# Patient Record
Sex: FEMALE | Race: WHITE | Employment: OTHER | URBAN - METROPOLITAN AREA
[De-identification: names, ages, dates, MRNs, and addresses within clinical notes are randomized per-mention and may not be internally consistent; named-entity substitution may affect disease eponyms.]

---

## 2017-04-13 PROBLEM — G57.90 ILIOINGUINAL NEURALGIA: Chronic | Status: ACTIVE | Noted: 2017-04-13

## 2017-04-13 PROBLEM — G89.29 CHRONIC PAIN: Chronic | Status: ACTIVE | Noted: 2017-04-13

## 2017-08-16 PROBLEM — R22.1 NECK SWELLING: Status: ACTIVE | Noted: 2017-08-16

## 2017-08-21 PROBLEM — D50.0 IRON DEFICIENCY ANEMIA DUE TO CHRONIC BLOOD LOSS: Status: ACTIVE | Noted: 2017-08-21

## 2017-08-21 PROBLEM — D50.9 IRON DEFICIENCY ANEMIA: Status: ACTIVE | Noted: 2017-08-21

## 2017-09-09 PROBLEM — G89.29 CHRONIC PAIN: Chronic | Status: RESOLVED | Noted: 2017-04-13 | Resolved: 2017-09-09

## 2017-09-09 PROBLEM — G89.4 CHRONIC PAIN SYNDROME: Chronic | Status: ACTIVE | Noted: 2017-09-09

## 2017-09-12 PROBLEM — M54.2 CERVICALGIA: Chronic | Status: ACTIVE | Noted: 2017-09-12

## 2017-09-12 PROBLEM — M79.2 NEUROPATHIC PAIN: Chronic | Status: ACTIVE | Noted: 2017-09-12

## 2018-04-18 ENCOUNTER — OFFICE VISIT (OUTPATIENT)
Dept: CARDIOLOGY CLINIC | Age: 63
End: 2018-04-18
Payer: MEDICARE

## 2018-04-18 VITALS
SYSTOLIC BLOOD PRESSURE: 134 MMHG | DIASTOLIC BLOOD PRESSURE: 74 MMHG | HEART RATE: 56 BPM | BODY MASS INDEX: 27.26 KG/M2 | HEIGHT: 66 IN | RESPIRATION RATE: 18 BRPM | WEIGHT: 169.6 LBS

## 2018-04-18 DIAGNOSIS — R00.1 SINUS BRADYCARDIA: ICD-10-CM

## 2018-04-18 DIAGNOSIS — E11.9 TYPE 2 DIABETES MELLITUS WITHOUT COMPLICATION, WITHOUT LONG-TERM CURRENT USE OF INSULIN (HCC): ICD-10-CM

## 2018-04-18 DIAGNOSIS — E78.5 HYPERLIPIDEMIA WITH TARGET LDL LESS THAN 100: ICD-10-CM

## 2018-04-18 DIAGNOSIS — R00.2 HEART PALPITATIONS: Primary | ICD-10-CM

## 2018-04-18 PROCEDURE — 99215 OFFICE O/P EST HI 40 MIN: CPT | Performed by: INTERNAL MEDICINE

## 2018-04-18 PROCEDURE — 93000 ELECTROCARDIOGRAM COMPLETE: CPT | Performed by: INTERNAL MEDICINE

## 2018-04-18 RX ORDER — TURMERIC ROOT EXTRACT 500 MG
500 TABLET ORAL DAILY
COMMUNITY
End: 2020-08-06

## 2018-04-18 RX ORDER — ONDANSETRON 4 MG/1
4 TABLET, FILM COATED ORAL EVERY 8 HOURS PRN
COMMUNITY

## 2018-04-18 RX ORDER — MAG HYDROX/ALUMINUM HYD/SIMETH 400-400-40
450 SUSPENSION, ORAL (FINAL DOSE FORM) ORAL DAILY
COMMUNITY
End: 2020-08-06

## 2018-04-18 RX ORDER — IBUPROFEN 200 MG/1
1 CAPSULE, LIQUID FILLED ORAL
COMMUNITY
End: 2019-07-10 | Stop reason: ALTCHOICE

## 2018-05-16 ENCOUNTER — OFFICE VISIT (OUTPATIENT)
Dept: ONCOLOGY | Age: 63
End: 2018-05-16
Payer: MEDICARE

## 2018-05-16 ENCOUNTER — HOSPITAL ENCOUNTER (OUTPATIENT)
Dept: INFUSION THERAPY | Age: 63
Discharge: HOME | End: 2018-05-16
Payer: MEDICARE

## 2018-05-16 VITALS
HEIGHT: 66 IN | DIASTOLIC BLOOD PRESSURE: 71 MMHG | TEMPERATURE: 98.2 F | SYSTOLIC BLOOD PRESSURE: 125 MMHG | WEIGHT: 164.5 LBS | RESPIRATION RATE: 20 BRPM | BODY MASS INDEX: 26.44 KG/M2 | HEART RATE: 82 BPM

## 2018-05-16 DIAGNOSIS — C50.919 MALIGNANT NEOPLASM OF FEMALE BREAST, UNSPECIFIED ESTROGEN RECEPTOR STATUS, UNSPECIFIED LATERALITY, UNSPECIFIED SITE OF BREAST (HCC): ICD-10-CM

## 2018-05-16 DIAGNOSIS — D50.0 IRON DEFICIENCY ANEMIA DUE TO CHRONIC BLOOD LOSS: Primary | ICD-10-CM

## 2018-05-16 DIAGNOSIS — D50.0 IRON DEFICIENCY ANEMIA DUE TO CHRONIC BLOOD LOSS: ICD-10-CM

## 2018-05-16 LAB
ALBUMIN SERPL-MCNC: 4.3 G/DL (ref 3.5–5.2)
ALP BLD-CCNC: 52 U/L (ref 35–104)
ALT SERPL-CCNC: 22 U/L (ref 0–32)
ANION GAP SERPL CALCULATED.3IONS-SCNC: 14 MMOL/L (ref 7–16)
AST SERPL-CCNC: 34 U/L (ref 0–31)
BASOPHILS ABSOLUTE: 0.04 E9/L (ref 0–0.2)
BASOPHILS RELATIVE PERCENT: 0.7 % (ref 0–2)
BILIRUB SERPL-MCNC: <0.2 MG/DL (ref 0–1.2)
BUN BLDV-MCNC: 14 MG/DL (ref 8–23)
CALCIUM SERPL-MCNC: 9.5 MG/DL (ref 8.6–10.2)
CHLORIDE BLD-SCNC: 100 MMOL/L (ref 98–107)
CO2: 26 MMOL/L (ref 22–29)
CREAT SERPL-MCNC: 0.8 MG/DL (ref 0.5–1)
EOSINOPHILS ABSOLUTE: 0.15 E9/L (ref 0.05–0.5)
EOSINOPHILS RELATIVE PERCENT: 2.8 % (ref 0–6)
FERRITIN: 23 NG/ML
GFR AFRICAN AMERICAN: >60
GFR NON-AFRICAN AMERICAN: >60 ML/MIN/1.73
GLUCOSE BLD-MCNC: 172 MG/DL (ref 74–109)
HCT VFR BLD CALC: 37.3 % (ref 34–48)
HEMOGLOBIN: 12.1 G/DL (ref 11.5–15.5)
IMMATURE GRANULOCYTES #: 0.02 E9/L
IMMATURE GRANULOCYTES %: 0.4 % (ref 0–5)
IRON SATURATION: 12 % (ref 15–50)
IRON: 41 MCG/DL (ref 37–145)
LYMPHOCYTES ABSOLUTE: 1.97 E9/L (ref 1.5–4)
LYMPHOCYTES RELATIVE PERCENT: 36.8 % (ref 20–42)
MCH RBC QN AUTO: 30.5 PG (ref 26–35)
MCHC RBC AUTO-ENTMCNC: 32.4 % (ref 32–34.5)
MCV RBC AUTO: 94 FL (ref 80–99.9)
MONOCYTES ABSOLUTE: 0.45 E9/L (ref 0.1–0.95)
MONOCYTES RELATIVE PERCENT: 8.4 % (ref 2–12)
NEUTROPHILS ABSOLUTE: 2.73 E9/L (ref 1.8–7.3)
NEUTROPHILS RELATIVE PERCENT: 50.9 % (ref 43–80)
PDW BLD-RTO: 14 FL (ref 11.5–15)
PLATELET # BLD: 263 E9/L (ref 130–450)
PMV BLD AUTO: 9.2 FL (ref 7–12)
POTASSIUM SERPL-SCNC: 4.1 MMOL/L (ref 3.5–5)
RBC # BLD: 3.97 E12/L (ref 3.5–5.5)
SODIUM BLD-SCNC: 140 MMOL/L (ref 132–146)
TOTAL IRON BINDING CAPACITY: 353 MCG/DL (ref 250–450)
TOTAL PROTEIN: 7.5 G/DL (ref 6.4–8.3)
WBC # BLD: 5.4 E9/L (ref 4.5–11.5)

## 2018-05-16 PROCEDURE — 36415 COLL VENOUS BLD VENIPUNCTURE: CPT | Performed by: INTERNAL MEDICINE

## 2018-05-16 PROCEDURE — 36415 COLL VENOUS BLD VENIPUNCTURE: CPT

## 2018-05-16 PROCEDURE — 99213 OFFICE O/P EST LOW 20 MIN: CPT | Performed by: INTERNAL MEDICINE

## 2018-05-16 PROCEDURE — 85025 COMPLETE CBC W/AUTO DIFF WBC: CPT

## 2018-05-16 PROCEDURE — 80053 COMPREHEN METABOLIC PANEL: CPT

## 2018-05-16 PROCEDURE — 83550 IRON BINDING TEST: CPT

## 2018-05-16 PROCEDURE — 83540 ASSAY OF IRON: CPT

## 2018-05-16 PROCEDURE — 82728 ASSAY OF FERRITIN: CPT

## 2018-05-16 RX ORDER — LOXAPINE SUCCINATE 10 MG/1
10 TABLET ORAL NIGHTLY
COMMUNITY

## 2018-07-25 ENCOUNTER — HOSPITAL ENCOUNTER (OUTPATIENT)
Age: 63
Discharge: HOME | End: 2018-07-25
Payer: MEDICARE

## 2018-07-25 LAB
ALBUMIN SERPL-MCNC: 4.4 G/DL (ref 3.5–5.2)
ALP BLD-CCNC: 48 U/L (ref 35–104)
ALT SERPL-CCNC: 23 U/L (ref 0–32)
AMYLASE: 62 U/L (ref 20–100)
ANION GAP SERPL CALCULATED.3IONS-SCNC: 12 MMOL/L (ref 7–16)
AST SERPL-CCNC: 38 U/L (ref 0–31)
BASOPHILS ABSOLUTE: 0.03 E9/L (ref 0–0.2)
BASOPHILS RELATIVE PERCENT: 0.5 % (ref 0–2)
BILIRUB SERPL-MCNC: <0.2 MG/DL (ref 0–1.2)
BUN BLDV-MCNC: 12 MG/DL (ref 8–23)
CALCIUM SERPL-MCNC: 10.1 MG/DL (ref 8.6–10.2)
CHLORIDE BLD-SCNC: 104 MMOL/L (ref 98–107)
CO2: 25 MMOL/L (ref 22–29)
CREAT SERPL-MCNC: 0.8 MG/DL (ref 0.5–1)
EOSINOPHILS ABSOLUTE: 0.14 E9/L (ref 0.05–0.5)
EOSINOPHILS RELATIVE PERCENT: 2.4 % (ref 0–6)
GFR AFRICAN AMERICAN: >60
GFR NON-AFRICAN AMERICAN: >60 ML/MIN/1.73
GLUCOSE BLD-MCNC: 164 MG/DL (ref 74–109)
HCT VFR BLD CALC: 38.3 % (ref 34–48)
HEMOGLOBIN: 12.2 G/DL (ref 11.5–15.5)
IMMATURE GRANULOCYTES #: 0.02 E9/L
IMMATURE GRANULOCYTES %: 0.3 % (ref 0–5)
LIPASE: 84 U/L (ref 13–60)
LYMPHOCYTES ABSOLUTE: 2.16 E9/L (ref 1.5–4)
LYMPHOCYTES RELATIVE PERCENT: 37.1 % (ref 20–42)
MCH RBC QN AUTO: 29.6 PG (ref 26–35)
MCHC RBC AUTO-ENTMCNC: 31.9 % (ref 32–34.5)
MCV RBC AUTO: 93 FL (ref 80–99.9)
MONOCYTES ABSOLUTE: 0.56 E9/L (ref 0.1–0.95)
MONOCYTES RELATIVE PERCENT: 9.6 % (ref 2–12)
NEUTROPHILS ABSOLUTE: 2.91 E9/L (ref 1.8–7.3)
NEUTROPHILS RELATIVE PERCENT: 50.1 % (ref 43–80)
PDW BLD-RTO: 13.9 FL (ref 11.5–15)
PLATELET # BLD: 281 E9/L (ref 130–450)
PMV BLD AUTO: 9.2 FL (ref 7–12)
POTASSIUM SERPL-SCNC: 5.3 MMOL/L (ref 3.5–5)
RBC # BLD: 4.12 E12/L (ref 3.5–5.5)
SODIUM BLD-SCNC: 141 MMOL/L (ref 132–146)
TOTAL PROTEIN: 7.7 G/DL (ref 6.4–8.3)
WBC # BLD: 5.8 E9/L (ref 4.5–11.5)

## 2018-07-25 PROCEDURE — 87522 HEPATITIS C REVRS TRNSCRPJ: CPT

## 2018-07-25 PROCEDURE — 82150 ASSAY OF AMYLASE: CPT

## 2018-07-25 PROCEDURE — 83690 ASSAY OF LIPASE: CPT

## 2018-07-25 PROCEDURE — 36415 COLL VENOUS BLD VENIPUNCTURE: CPT

## 2018-07-25 PROCEDURE — 80053 COMPREHEN METABOLIC PANEL: CPT

## 2018-07-25 PROCEDURE — 85025 COMPLETE CBC W/AUTO DIFF WBC: CPT

## 2018-07-29 LAB
EER HCV RNA QNT PCR: NORMAL
HCV RNA QNT REAL-TIME PCR INTERP: NOT DETECTED
HCV RNA, QUANTITATIVE REAL TIME PCR: <1.2 LOG IU
HEPATITIS C RNA PCR QUANT: <15 IU/ML

## 2018-10-31 ENCOUNTER — HOSPITAL ENCOUNTER (OUTPATIENT)
Dept: MAMMOGRAPHY | Age: 63
Discharge: HOME | End: 2018-11-02
Payer: MEDICARE

## 2018-10-31 DIAGNOSIS — D50.0 IRON DEFICIENCY ANEMIA DUE TO CHRONIC BLOOD LOSS: ICD-10-CM

## 2018-10-31 DIAGNOSIS — C50.919 MALIGNANT NEOPLASM OF FEMALE BREAST, UNSPECIFIED ESTROGEN RECEPTOR STATUS, UNSPECIFIED LATERALITY, UNSPECIFIED SITE OF BREAST (HCC): ICD-10-CM

## 2018-10-31 PROCEDURE — 77063 BREAST TOMOSYNTHESIS BI: CPT

## 2018-11-19 DIAGNOSIS — M25.511 ACUTE PAIN OF RIGHT SHOULDER: Primary | ICD-10-CM

## 2018-11-20 ENCOUNTER — OFFICE VISIT (OUTPATIENT)
Dept: ORTHOPEDIC SURGERY | Age: 63
End: 2018-11-20
Payer: MEDICARE

## 2018-11-20 VITALS — WEIGHT: 151 LBS | HEIGHT: 66 IN | BODY MASS INDEX: 24.27 KG/M2

## 2018-11-20 DIAGNOSIS — M75.121 COMPLETE TEAR OF RIGHT ROTATOR CUFF: ICD-10-CM

## 2018-11-20 DIAGNOSIS — M75.41 SHOULDER IMPINGEMENT, RIGHT: Primary | ICD-10-CM

## 2018-11-20 PROCEDURE — 99203 OFFICE O/P NEW LOW 30 MIN: CPT | Performed by: ORTHOPAEDIC SURGERY

## 2018-11-20 RX ORDER — BUPROPION HYDROCHLORIDE 150 MG/1
150 TABLET ORAL EVERY MORNING
COMMUNITY
End: 2020-08-06

## 2018-11-20 RX ORDER — ASPIRIN 325 MG
81 TABLET ORAL DAILY
COMMUNITY
End: 2019-04-18

## 2018-11-27 ENCOUNTER — HOSPITAL ENCOUNTER (OUTPATIENT)
Dept: MRI IMAGING | Age: 63
Discharge: HOME | End: 2018-11-29
Payer: MEDICARE

## 2018-11-27 ENCOUNTER — TELEPHONE (OUTPATIENT)
Dept: INFUSION THERAPY | Age: 63
End: 2018-11-27

## 2018-11-27 DIAGNOSIS — M75.121 COMPLETE TEAR OF RIGHT ROTATOR CUFF: ICD-10-CM

## 2018-11-27 DIAGNOSIS — M75.41 SHOULDER IMPINGEMENT, RIGHT: ICD-10-CM

## 2018-11-27 PROCEDURE — 73221 MRI JOINT UPR EXTREM W/O DYE: CPT

## 2018-11-29 ENCOUNTER — OFFICE VISIT (OUTPATIENT)
Dept: ORTHOPEDIC SURGERY | Age: 63
End: 2018-11-29
Payer: MEDICARE

## 2018-11-29 VITALS — HEIGHT: 66 IN | WEIGHT: 152 LBS | TEMPERATURE: 98 F | BODY MASS INDEX: 24.43 KG/M2

## 2018-11-29 DIAGNOSIS — M75.41 SHOULDER IMPINGEMENT, RIGHT: Primary | ICD-10-CM

## 2018-11-29 DIAGNOSIS — M75.111 INCOMPLETE TEAR OF RIGHT ROTATOR CUFF: ICD-10-CM

## 2018-11-29 PROCEDURE — 99213 OFFICE O/P EST LOW 20 MIN: CPT | Performed by: ORTHOPAEDIC SURGERY

## 2018-11-29 PROCEDURE — 20610 DRAIN/INJ JOINT/BURSA W/O US: CPT | Performed by: ORTHOPAEDIC SURGERY

## 2018-11-29 RX ORDER — TRIAMCINOLONE ACETONIDE 40 MG/ML
40 INJECTION, SUSPENSION INTRA-ARTICULAR; INTRAMUSCULAR ONCE
Status: COMPLETED | OUTPATIENT
Start: 2018-11-29 | End: 2018-11-29

## 2018-11-29 RX ADMIN — TRIAMCINOLONE ACETONIDE 40 MG: 40 INJECTION, SUSPENSION INTRA-ARTICULAR; INTRAMUSCULAR at 09:39

## 2018-11-29 NOTE — PROGRESS NOTES
Chief Complaint   Patient presents with    Shoulder Pain     Right Shoulder MRI F/U        Josué Flowers is a 61y.o. year old   female who is seen today  for evaluation of right shoulder pain. She reports the pain has been ongoing for the past 2 months. She does recall a specific injury which started the pain. She was showering and felt a pop in her shoulder raising her arm up. She reports the pain is worse with activity, better with rest.  The patient does have mechanical symptoms. Shedoes have night pain. She denies a feeling of instability. The prior treatments have been none. The patient   has not responded to the treatment. The patient is right hand dominant. The patient is not working. The patients occupation is retired.  She is here for MRI results      Chief Complaint   Patient presents with    Shoulder Pain     Right Shoulder MRI F/U     Past Medical History:   Diagnosis Date    Arthritis     spine    Asthma     Bipolar depression ([address]     Bronchitis     Cancer ([address]     breast left    Diabetes mellitus ([address]     Diverticulitis     Fibromyalgia     GERD (gastroesophageal reflux disease)     Herpes     herpes 2 genitalia    Hiatal hernia     History of blood transfusion     History of cardiovascular stress test 2/15/14    lexiscan    Hyperlipidemia     Hypertensive cardiovascular disease 2/4/2013    IBS (irritable bowel syndrome)     Liver disease     Steato Hepatitis (fatty liver)    Migraines     Neuropathy     of feet    Osteopenia     Pancreatitis     Pericarditis [id number]    Pneumonia oct 1991    PONV (postoperative nausea and vomiting)     TIA (transient ischemic attack) 2008     Past Surgical History:   Procedure Laterality Date    ABDOMEN SURGERY  march 2010    polyp removal    ABDOMEN SURGERY      exploratory for scar tissue    APPENDECTOMY      BREAST SURGERY  oct 26 2010, nov 11 2010    lumpectomy left    BREAST SURGERY Left 10/14/2015    excision Disp: , Rfl:     CALCIUM-VITAMIN D PO, Take 1 tablet by mouth daily 1200 mg/ 25mg, Disp: , Rfl:     losartan (COZAAR) 25 MG tablet, Take 25 mg by mouth daily. , Disp: , Rfl:     Multiple Vitamin (MULTIVITAMIN PO), Take 1 tablet by mouth once., Disp: , Rfl:     fish oil-omega-3 fatty acids 1000 MG capsule, Take 1 g by mouth 3 times daily , Disp: , Rfl:     glipiZIDE (GLUCOTROL) 10 MG CR tablet, Take 10 mg by mouth 2 times daily (before meals). Glipizide er, Disp: , Rfl:     Sertraline HCl (ZOLOFT PO), Take 250 mg by mouth nightly., Disp: , Rfl:     celecoxib (CELEBREX) 100 MG capsule, Take 1 capsule by mouth every 12 hours This is a 90 day supply, Disp: 180 capsule, Rfl: 3    HYDROcodone-acetaminophen (NORCO) 5-325 MG per tablet, Take 1 tablet by mouth every 8 hours as needed for Pain . Earliest Fill Date: 11/11/17, Disp: 90 tablet, Rfl: 0  Allergies   Allergen Reactions    Codeine Swelling     Pure codeine gets itching    Macrobid [Nitrofurantoin Monohydrate Macrocrystals] Rash    Neosporin [Neomycin-Polymyx-Gramicid] Dermatitis    Pcn [Penicillins] Hives     On stomach and thighs      Ultram [Tramadol Hcl] Itching     & hallucinations      Sulfa Antibiotics      Skin turns red       Social History     Social History    Marital status:      Spouse name: N/A    Number of children: N/A    Years of education: N/A     Occupational History    Not on file. Social History Main Topics    Smoking status: Never Smoker    Smokeless tobacco: Never Used    Alcohol use No      Comment: 4-6 cups of coffee daily    Drug use: No    Sexual activity: Yes     Partners: Male      Comment: not assessed     Other Topics Concern    Not on file     Social History Narrative    No narrative on file     Family History   Problem Relation Age of Onset    Kidney Disease Mother     Stroke Father        REVIEW OF SYSTEMS:     General/Constitution:  (-)weight loss, (-)fever, (-)chills, (-)weakness.    Skin: (-) insertion of the infraspinatus tendon. 2. Tendinopathy versus partial tear of the supraspinatus and to a   lesser extent infraspinatus tendons. 3. Other findings as discussed         Radiographic findings reviewed with patient    Impression:   Encounter Diagnosis   Name Primary?  Shoulder impingement, right Yes       Plan:   I had a lengthy discussion with the patient regarding their diagnosis. I explained treatment options including surgical vs non surgical treatment. I reviewed in detail the risks and benefits and outlined the procedure in detail with expected outcomes and possible complications. I also discussed non surgical treatment such as injections (CSI), physical therapy, topical creams and NSAID's. They have elected for conservative management at this time. I will proceed with a cortisone injection in the Right shoulder. Verbal and written consent was obtained for the injection. Skin was prepped with alcohol, 1 ml of Kenalog 40 mg and 9 ml of 0.25% Marcaine was injected to the posterior shoulder through the subacromial space of the Right Shoulder. The patient tolerated the injections well. I will see the patient back prn.   HEP

## 2018-12-17 ENCOUNTER — OFFICE VISIT (OUTPATIENT)
Dept: ONCOLOGY | Age: 63
End: 2018-12-17
Payer: MEDICARE

## 2018-12-17 ENCOUNTER — HOSPITAL ENCOUNTER (OUTPATIENT)
Dept: INFUSION THERAPY | Age: 63
Discharge: HOME | End: 2018-12-17
Payer: MEDICARE

## 2018-12-17 VITALS
RESPIRATION RATE: 20 BRPM | BODY MASS INDEX: 25.26 KG/M2 | HEART RATE: 70 BPM | DIASTOLIC BLOOD PRESSURE: 74 MMHG | WEIGHT: 157.2 LBS | SYSTOLIC BLOOD PRESSURE: 130 MMHG | HEIGHT: 66 IN | TEMPERATURE: 98.5 F

## 2018-12-17 DIAGNOSIS — D50.0 IRON DEFICIENCY ANEMIA DUE TO CHRONIC BLOOD LOSS: ICD-10-CM

## 2018-12-17 DIAGNOSIS — C50.919 MALIGNANT NEOPLASM OF FEMALE BREAST, UNSPECIFIED ESTROGEN RECEPTOR STATUS, UNSPECIFIED LATERALITY, UNSPECIFIED SITE OF BREAST (HCC): Primary | ICD-10-CM

## 2018-12-17 DIAGNOSIS — C50.919 MALIGNANT NEOPLASM OF FEMALE BREAST, UNSPECIFIED ESTROGEN RECEPTOR STATUS, UNSPECIFIED LATERALITY, UNSPECIFIED SITE OF BREAST (HCC): ICD-10-CM

## 2018-12-17 LAB
ALBUMIN SERPL-MCNC: 4.4 G/DL (ref 3.5–5.2)
ALP BLD-CCNC: 47 U/L (ref 35–104)
ALT SERPL-CCNC: 21 U/L (ref 0–32)
ANION GAP SERPL CALCULATED.3IONS-SCNC: 14 MMOL/L (ref 7–16)
AST SERPL-CCNC: 26 U/L (ref 0–31)
BASOPHILS ABSOLUTE: 0.04 E9/L (ref 0–0.2)
BASOPHILS RELATIVE PERCENT: 0.6 % (ref 0–2)
BILIRUB SERPL-MCNC: 0.2 MG/DL (ref 0–1.2)
BUN BLDV-MCNC: 11 MG/DL (ref 8–23)
CALCIUM SERPL-MCNC: 9.1 MG/DL (ref 8.6–10.2)
CHLORIDE BLD-SCNC: 102 MMOL/L (ref 98–107)
CO2: 25 MMOL/L (ref 22–29)
CREAT SERPL-MCNC: 0.7 MG/DL (ref 0.5–1)
EOSINOPHILS ABSOLUTE: 0.16 E9/L (ref 0.05–0.5)
EOSINOPHILS RELATIVE PERCENT: 2.5 % (ref 0–6)
FERRITIN: 9 NG/ML
GFR AFRICAN AMERICAN: >60
GFR NON-AFRICAN AMERICAN: >60 ML/MIN/1.73
GLUCOSE BLD-MCNC: 117 MG/DL (ref 74–99)
HCT VFR BLD CALC: 35.9 % (ref 34–48)
HEMOGLOBIN: 11.1 G/DL (ref 11.5–15.5)
IMMATURE GRANULOCYTES #: 0.03 E9/L
IMMATURE GRANULOCYTES %: 0.5 % (ref 0–5)
IRON SATURATION: 11 % (ref 15–50)
IRON: 42 MCG/DL (ref 37–145)
LYMPHOCYTES ABSOLUTE: 2.24 E9/L (ref 1.5–4)
LYMPHOCYTES RELATIVE PERCENT: 34.6 % (ref 20–42)
MCH RBC QN AUTO: 27 PG (ref 26–35)
MCHC RBC AUTO-ENTMCNC: 30.9 % (ref 32–34.5)
MCV RBC AUTO: 87.3 FL (ref 80–99.9)
MONOCYTES ABSOLUTE: 0.64 E9/L (ref 0.1–0.95)
MONOCYTES RELATIVE PERCENT: 9.9 % (ref 2–12)
NEUTROPHILS ABSOLUTE: 3.37 E9/L (ref 1.8–7.3)
NEUTROPHILS RELATIVE PERCENT: 51.9 % (ref 43–80)
PDW BLD-RTO: 14 FL (ref 11.5–15)
PLATELET # BLD: 321 E9/L (ref 130–450)
PMV BLD AUTO: 8.5 FL (ref 7–12)
POTASSIUM SERPL-SCNC: 4.7 MMOL/L (ref 3.5–5)
RBC # BLD: 4.11 E12/L (ref 3.5–5.5)
SODIUM BLD-SCNC: 141 MMOL/L (ref 132–146)
TOTAL IRON BINDING CAPACITY: 383 MCG/DL (ref 250–450)
TOTAL PROTEIN: 7.6 G/DL (ref 6.4–8.3)
WBC # BLD: 6.5 E9/L (ref 4.5–11.5)

## 2018-12-17 PROCEDURE — 83540 ASSAY OF IRON: CPT

## 2018-12-17 PROCEDURE — 99212 OFFICE O/P EST SF 10 MIN: CPT | Performed by: INTERNAL MEDICINE

## 2018-12-17 PROCEDURE — 36415 COLL VENOUS BLD VENIPUNCTURE: CPT

## 2018-12-17 PROCEDURE — 83550 IRON BINDING TEST: CPT

## 2018-12-17 PROCEDURE — 85025 COMPLETE CBC W/AUTO DIFF WBC: CPT

## 2018-12-17 PROCEDURE — 80053 COMPREHEN METABOLIC PANEL: CPT

## 2018-12-17 PROCEDURE — 82728 ASSAY OF FERRITIN: CPT

## 2018-12-17 RX ORDER — CIPROFLOXACIN 500 MG/1
500 TABLET, FILM COATED ORAL 2 TIMES DAILY
COMMUNITY
End: 2019-02-18 | Stop reason: ALTCHOICE

## 2018-12-17 RX ORDER — SODIUM CHLORIDE 9 MG/ML
INJECTION, SOLUTION INTRAVENOUS ONCE
Status: CANCELLED | OUTPATIENT
Start: 2018-12-17 | End: 2018-12-17

## 2018-12-18 ENCOUNTER — TELEPHONE (OUTPATIENT)
Dept: ONCOLOGY | Age: 63
End: 2018-12-18

## 2018-12-27 ENCOUNTER — HOSPITAL ENCOUNTER (OUTPATIENT)
Dept: INFUSION THERAPY | Age: 63
Discharge: HOME | End: 2018-12-27
Payer: MEDICARE

## 2018-12-27 VITALS
TEMPERATURE: 98.3 F | SYSTOLIC BLOOD PRESSURE: 133 MMHG | HEART RATE: 62 BPM | RESPIRATION RATE: 20 BRPM | DIASTOLIC BLOOD PRESSURE: 73 MMHG

## 2018-12-27 DIAGNOSIS — D50.0 IRON DEFICIENCY ANEMIA DUE TO CHRONIC BLOOD LOSS: ICD-10-CM

## 2018-12-27 PROCEDURE — 6360000002 HC RX W HCPCS: Performed by: NURSE PRACTITIONER

## 2018-12-27 PROCEDURE — 96365 THER/PROPH/DIAG IV INF INIT: CPT

## 2018-12-27 PROCEDURE — 96367 TX/PROPH/DG ADDL SEQ IV INF: CPT

## 2018-12-27 PROCEDURE — 2580000003 HC RX 258: Performed by: NURSE PRACTITIONER

## 2018-12-27 RX ORDER — SODIUM CHLORIDE 9 MG/ML
INJECTION, SOLUTION INTRAVENOUS ONCE
Status: CANCELLED | OUTPATIENT
Start: 2018-12-27 | End: 2018-12-27

## 2018-12-27 RX ORDER — SODIUM CHLORIDE 9 MG/ML
INJECTION, SOLUTION INTRAVENOUS ONCE
Status: COMPLETED | OUTPATIENT
Start: 2018-12-27 | End: 2018-12-27

## 2018-12-27 RX ADMIN — SODIUM CHLORIDE: 9 INJECTION, SOLUTION INTRAVENOUS at 10:17

## 2018-12-27 RX ADMIN — FERUMOXYTOL 510 MG: 510 INJECTION INTRAVENOUS at 10:17

## 2019-01-25 ENCOUNTER — HOSPITAL ENCOUNTER (OUTPATIENT)
Age: 64
Discharge: HOME | End: 2019-01-25
Payer: MEDICARE

## 2019-01-25 LAB
ALBUMIN SERPL-MCNC: 4.8 G/DL (ref 3.5–5.2)
ALP BLD-CCNC: 47 U/L (ref 35–104)
ALT SERPL-CCNC: 21 U/L (ref 0–32)
ANION GAP SERPL CALCULATED.3IONS-SCNC: 14 MMOL/L (ref 7–16)
AST SERPL-CCNC: 26 U/L (ref 0–31)
BASOPHILS ABSOLUTE: 0.04 E9/L (ref 0–0.2)
BASOPHILS RELATIVE PERCENT: 0.7 % (ref 0–2)
BILIRUB SERPL-MCNC: 0.3 MG/DL (ref 0–1.2)
BUN BLDV-MCNC: 9 MG/DL (ref 8–23)
CALCIUM SERPL-MCNC: 10.3 MG/DL (ref 8.6–10.2)
CHLORIDE BLD-SCNC: 97 MMOL/L (ref 98–107)
CO2: 28 MMOL/L (ref 22–29)
CREAT SERPL-MCNC: 0.7 MG/DL (ref 0.5–1)
EOSINOPHILS ABSOLUTE: 0.09 E9/L (ref 0.05–0.5)
EOSINOPHILS RELATIVE PERCENT: 1.5 % (ref 0–6)
FERRITIN: 89 NG/ML
GFR AFRICAN AMERICAN: >60
GFR NON-AFRICAN AMERICAN: >60 ML/MIN/1.73
GLUCOSE BLD-MCNC: 112 MG/DL (ref 74–99)
HCT VFR BLD CALC: 43.2 % (ref 34–48)
HEMOGLOBIN: 13.7 G/DL (ref 11.5–15.5)
IMMATURE GRANULOCYTES #: 0.01 E9/L
IMMATURE GRANULOCYTES %: 0.2 % (ref 0–5)
IRON SATURATION: 26 % (ref 15–50)
IRON: 85 MCG/DL (ref 37–145)
LYMPHOCYTES ABSOLUTE: 2.26 E9/L (ref 1.5–4)
LYMPHOCYTES RELATIVE PERCENT: 37.2 % (ref 20–42)
MCH RBC QN AUTO: 28.5 PG (ref 26–35)
MCHC RBC AUTO-ENTMCNC: 31.7 % (ref 32–34.5)
MCV RBC AUTO: 89.8 FL (ref 80–99.9)
MONOCYTES ABSOLUTE: 0.59 E9/L (ref 0.1–0.95)
MONOCYTES RELATIVE PERCENT: 9.7 % (ref 2–12)
NEUTROPHILS ABSOLUTE: 3.08 E9/L (ref 1.8–7.3)
NEUTROPHILS RELATIVE PERCENT: 50.7 % (ref 43–80)
PDW BLD-RTO: 18.6 FL (ref 11.5–15)
PLATELET # BLD: 298 E9/L (ref 130–450)
PMV BLD AUTO: 8.7 FL (ref 7–12)
POTASSIUM SERPL-SCNC: 4.3 MMOL/L (ref 3.5–5)
RBC # BLD: 4.81 E12/L (ref 3.5–5.5)
SODIUM BLD-SCNC: 139 MMOL/L (ref 132–146)
TOTAL IRON BINDING CAPACITY: 325 MCG/DL (ref 250–450)
TOTAL PROTEIN: 8.1 G/DL (ref 6.4–8.3)
WBC # BLD: 6.1 E9/L (ref 4.5–11.5)

## 2019-01-25 PROCEDURE — 82728 ASSAY OF FERRITIN: CPT

## 2019-01-25 PROCEDURE — 80053 COMPREHEN METABOLIC PANEL: CPT

## 2019-01-25 PROCEDURE — 83540 ASSAY OF IRON: CPT

## 2019-01-25 PROCEDURE — 83550 IRON BINDING TEST: CPT

## 2019-01-25 PROCEDURE — 85025 COMPLETE CBC W/AUTO DIFF WBC: CPT

## 2019-01-25 PROCEDURE — 36415 COLL VENOUS BLD VENIPUNCTURE: CPT

## 2019-01-29 ENCOUNTER — HOSPITAL ENCOUNTER (OUTPATIENT)
Age: 64
Discharge: HOME | End: 2019-01-29
Payer: MEDICARE

## 2019-01-29 PROCEDURE — 87088 URINE BACTERIA CULTURE: CPT

## 2019-01-31 LAB — URINE CULTURE, ROUTINE: NORMAL

## 2019-02-15 RX ORDER — SODIUM CHLORIDE 0.9 % (FLUSH) 0.9 %
10 SYRINGE (ML) INJECTION PRN
Status: CANCELLED | OUTPATIENT
Start: 2019-02-15

## 2019-02-15 RX ORDER — SODIUM CHLORIDE 9 MG/ML
INJECTION, SOLUTION INTRAVENOUS CONTINUOUS
Status: CANCELLED | OUTPATIENT
Start: 2019-02-15

## 2019-02-18 ENCOUNTER — OFFICE VISIT (OUTPATIENT)
Dept: ONCOLOGY | Age: 64
End: 2019-02-18
Payer: MEDICARE

## 2019-02-18 ENCOUNTER — HOSPITAL ENCOUNTER (OUTPATIENT)
Dept: PREADMISSION TESTING | Age: 64
Discharge: HOME | End: 2019-02-18
Payer: MEDICARE

## 2019-02-18 ENCOUNTER — HOSPITAL ENCOUNTER (OUTPATIENT)
Dept: INFUSION THERAPY | Age: 64
Discharge: HOME | End: 2019-02-18
Payer: MEDICARE

## 2019-02-18 VITALS
HEIGHT: 66 IN | HEART RATE: 63 BPM | SYSTOLIC BLOOD PRESSURE: 101 MMHG | DIASTOLIC BLOOD PRESSURE: 66 MMHG | RESPIRATION RATE: 20 BRPM | BODY MASS INDEX: 25.07 KG/M2 | WEIGHT: 156 LBS | TEMPERATURE: 98.7 F

## 2019-02-18 VITALS
RESPIRATION RATE: 16 BRPM | DIASTOLIC BLOOD PRESSURE: 66 MMHG | SYSTOLIC BLOOD PRESSURE: 106 MMHG | WEIGHT: 155.31 LBS | HEART RATE: 66 BPM | BODY MASS INDEX: 24.96 KG/M2 | OXYGEN SATURATION: 98 % | TEMPERATURE: 98.1 F | HEIGHT: 66 IN

## 2019-02-18 DIAGNOSIS — Z01.818 PRE-OP TESTING: Primary | ICD-10-CM

## 2019-02-18 DIAGNOSIS — C50.212 MALIGNANT NEOPLASM OF UPPER-INNER QUADRANT OF LEFT FEMALE BREAST, UNSPECIFIED ESTROGEN RECEPTOR STATUS (HCC): Primary | ICD-10-CM

## 2019-02-18 DIAGNOSIS — D50.0 IRON DEFICIENCY ANEMIA DUE TO CHRONIC BLOOD LOSS: ICD-10-CM

## 2019-02-18 DIAGNOSIS — Z17.0 ER+ (ESTROGEN RECEPTOR POSITIVE STATUS): ICD-10-CM

## 2019-02-18 LAB
ANION GAP SERPL CALCULATED.3IONS-SCNC: 12 MMOL/L (ref 7–16)
BUN BLDV-MCNC: 12 MG/DL (ref 8–23)
CALCIUM SERPL-MCNC: 9.4 MG/DL (ref 8.6–10.2)
CHLORIDE BLD-SCNC: 101 MMOL/L (ref 98–107)
CO2: 27 MMOL/L (ref 22–29)
CREAT SERPL-MCNC: 0.7 MG/DL (ref 0.5–1)
EKG ATRIAL RATE: 60 BPM
EKG P AXIS: 38 DEGREES
EKG P-R INTERVAL: 166 MS
EKG Q-T INTERVAL: 414 MS
EKG QRS DURATION: 86 MS
EKG QTC CALCULATION (BAZETT): 414 MS
EKG R AXIS: -23 DEGREES
EKG T AXIS: 17 DEGREES
EKG VENTRICULAR RATE: 60 BPM
GFR AFRICAN AMERICAN: >60
GFR NON-AFRICAN AMERICAN: >60 ML/MIN/1.73
GLUCOSE BLD-MCNC: 104 MG/DL (ref 74–99)
HCT VFR BLD CALC: 41 % (ref 34–48)
HEMOGLOBIN: 13.3 G/DL (ref 11.5–15.5)
MCH RBC QN AUTO: 28.9 PG (ref 26–35)
MCHC RBC AUTO-ENTMCNC: 32.4 % (ref 32–34.5)
MCV RBC AUTO: 89.1 FL (ref 80–99.9)
PDW BLD-RTO: 18.8 FL (ref 11.5–15)
PLATELET # BLD: 294 E9/L (ref 130–450)
PMV BLD AUTO: 9 FL (ref 7–12)
POTASSIUM REFLEX MAGNESIUM: 5 MMOL/L (ref 3.5–5)
RBC # BLD: 4.6 E12/L (ref 3.5–5.5)
SODIUM BLD-SCNC: 140 MMOL/L (ref 132–146)
WBC # BLD: 6.1 E9/L (ref 4.5–11.5)

## 2019-02-18 PROCEDURE — 85027 COMPLETE CBC AUTOMATED: CPT

## 2019-02-18 PROCEDURE — 93010 ELECTROCARDIOGRAM REPORT: CPT | Performed by: INTERNAL MEDICINE

## 2019-02-18 PROCEDURE — 99212 OFFICE O/P EST SF 10 MIN: CPT | Performed by: INTERNAL MEDICINE

## 2019-02-18 PROCEDURE — 80048 BASIC METABOLIC PNL TOTAL CA: CPT

## 2019-02-18 PROCEDURE — 36415 COLL VENOUS BLD VENIPUNCTURE: CPT

## 2019-02-18 PROCEDURE — 93005 ELECTROCARDIOGRAM TRACING: CPT | Performed by: ANESTHESIOLOGY

## 2019-02-18 RX ORDER — LANOLIN ALCOHOL/MO/W.PET/CERES
1000 CREAM (GRAM) TOPICAL DAILY
COMMUNITY
End: 2020-08-06

## 2019-02-18 ASSESSMENT — PAIN DESCRIPTION - LOCATION: LOCATION: ABDOMEN

## 2019-02-18 ASSESSMENT — PAIN DESCRIPTION - ORIENTATION: ORIENTATION: LOWER

## 2019-02-18 ASSESSMENT — PAIN SCALES - GENERAL: PAINLEVEL_OUTOF10: 1

## 2019-02-18 ASSESSMENT — PAIN DESCRIPTION - DESCRIPTORS: DESCRIPTORS: DISCOMFORT

## 2019-02-23 ENCOUNTER — ANESTHESIA EVENT (OUTPATIENT)
Dept: OPERATING ROOM | Age: 64
End: 2019-02-23
Payer: MEDICARE

## 2019-02-25 ENCOUNTER — ANESTHESIA (OUTPATIENT)
Dept: OPERATING ROOM | Age: 64
End: 2019-02-25
Payer: MEDICARE

## 2019-02-25 ENCOUNTER — HOSPITAL ENCOUNTER (OUTPATIENT)
Age: 64
Setting detail: OUTPATIENT SURGERY
Discharge: HOME | End: 2019-02-25
Attending: UROLOGY | Admitting: UROLOGY
Payer: MEDICARE

## 2019-02-25 ENCOUNTER — APPOINTMENT (OUTPATIENT)
Dept: GENERAL RADIOLOGY | Age: 64
End: 2019-02-25
Attending: UROLOGY
Payer: MEDICARE

## 2019-02-25 VITALS
HEART RATE: 71 BPM | HEIGHT: 66 IN | TEMPERATURE: 98.3 F | WEIGHT: 153.31 LBS | RESPIRATION RATE: 16 BRPM | DIASTOLIC BLOOD PRESSURE: 80 MMHG | SYSTOLIC BLOOD PRESSURE: 136 MMHG | BODY MASS INDEX: 24.64 KG/M2 | OXYGEN SATURATION: 95 %

## 2019-02-25 VITALS
RESPIRATION RATE: 20 BRPM | SYSTOLIC BLOOD PRESSURE: 130 MMHG | OXYGEN SATURATION: 97 % | DIASTOLIC BLOOD PRESSURE: 83 MMHG

## 2019-02-25 DIAGNOSIS — G89.18 POST-OPERATIVE PAIN: Primary | ICD-10-CM

## 2019-02-25 DIAGNOSIS — N32.2 FISTULA, BLADDER: ICD-10-CM

## 2019-02-25 LAB — METER GLUCOSE: 135 MG/DL (ref 74–99)

## 2019-02-25 PROCEDURE — 6360000002 HC RX W HCPCS

## 2019-02-25 PROCEDURE — 6360000002 HC RX W HCPCS: Performed by: UROLOGY

## 2019-02-25 PROCEDURE — 7100000010 HC PHASE II RECOVERY - FIRST 15 MIN: Performed by: UROLOGY

## 2019-02-25 PROCEDURE — 82962 GLUCOSE BLOOD TEST: CPT

## 2019-02-25 PROCEDURE — C1758 CATHETER, URETERAL: HCPCS | Performed by: UROLOGY

## 2019-02-25 PROCEDURE — 2580000003 HC RX 258: Performed by: UROLOGY

## 2019-02-25 PROCEDURE — 2500000003 HC RX 250 WO HCPCS

## 2019-02-25 PROCEDURE — 3600000013 HC SURGERY LEVEL 3 ADDTL 15MIN: Performed by: UROLOGY

## 2019-02-25 PROCEDURE — 3600000003 HC SURGERY LEVEL 3 BASE: Performed by: UROLOGY

## 2019-02-25 PROCEDURE — 74400 UROGRAPHY IV +-KUB TOMOG: CPT

## 2019-02-25 PROCEDURE — 2709999900 HC NON-CHARGEABLE SUPPLY: Performed by: UROLOGY

## 2019-02-25 PROCEDURE — 3700000001 HC ADD 15 MINUTES (ANESTHESIA): Performed by: UROLOGY

## 2019-02-25 PROCEDURE — 3700000000 HC ANESTHESIA ATTENDED CARE: Performed by: UROLOGY

## 2019-02-25 PROCEDURE — 6370000000 HC RX 637 (ALT 250 FOR IP): Performed by: ANESTHESIOLOGY

## 2019-02-25 PROCEDURE — 7100000011 HC PHASE II RECOVERY - ADDTL 15 MIN: Performed by: UROLOGY

## 2019-02-25 RX ORDER — SODIUM CHLORIDE 9 MG/ML
INJECTION, SOLUTION INTRAVENOUS CONTINUOUS
Status: DISCONTINUED | OUTPATIENT
Start: 2019-02-25 | End: 2019-02-25 | Stop reason: HOSPADM

## 2019-02-25 RX ORDER — METHENAMINE HIPPURATE 1000 MG/1
1 TABLET ORAL EVERY OTHER DAY
Qty: 15 TABLET | Refills: 3 | Status: SHIPPED | OUTPATIENT
Start: 2019-02-25 | End: 2019-08-27 | Stop reason: ALTCHOICE

## 2019-02-25 RX ORDER — HYDROCODONE BITARTRATE AND ACETAMINOPHEN 5; 325 MG/1; MG/1
1 TABLET ORAL
Status: COMPLETED | OUTPATIENT
Start: 2019-02-25 | End: 2019-02-25

## 2019-02-25 RX ORDER — CEFAZOLIN SODIUM 2 G/50ML
2 SOLUTION INTRAVENOUS
Status: DISCONTINUED | OUTPATIENT
Start: 2019-02-25 | End: 2019-02-25

## 2019-02-25 RX ORDER — ONDANSETRON 2 MG/ML
INJECTION INTRAMUSCULAR; INTRAVENOUS PRN
Status: DISCONTINUED | OUTPATIENT
Start: 2019-02-25 | End: 2019-02-25 | Stop reason: SDUPTHER

## 2019-02-25 RX ORDER — SODIUM CHLORIDE 0.9 % (FLUSH) 0.9 %
10 SYRINGE (ML) INJECTION PRN
Status: DISCONTINUED | OUTPATIENT
Start: 2019-02-25 | End: 2019-02-25 | Stop reason: HOSPADM

## 2019-02-25 RX ORDER — PROPOFOL 10 MG/ML
INJECTION, EMULSION INTRAVENOUS CONTINUOUS PRN
Status: DISCONTINUED | OUTPATIENT
Start: 2019-02-25 | End: 2019-02-25 | Stop reason: SDUPTHER

## 2019-02-25 RX ORDER — CIPROFLOXACIN 2 MG/ML
400 INJECTION, SOLUTION INTRAVENOUS ONCE
Status: COMPLETED | OUTPATIENT
Start: 2019-02-25 | End: 2019-02-25

## 2019-02-25 RX ORDER — MIDAZOLAM HYDROCHLORIDE 1 MG/ML
INJECTION INTRAMUSCULAR; INTRAVENOUS PRN
Status: DISCONTINUED | OUTPATIENT
Start: 2019-02-25 | End: 2019-02-25 | Stop reason: SDUPTHER

## 2019-02-25 RX ORDER — CIPROFLOXACIN 500 MG/1
500 TABLET, FILM COATED ORAL 2 TIMES DAILY
Qty: 10 TABLET | Refills: 0 | Status: SHIPPED | OUTPATIENT
Start: 2019-02-25 | End: 2019-03-02

## 2019-02-25 RX ORDER — MIDAZOLAM HYDROCHLORIDE 1 MG/ML
INJECTION INTRAMUSCULAR; INTRAVENOUS PRN
Status: DISCONTINUED | OUTPATIENT
Start: 2019-02-25 | End: 2019-02-25

## 2019-02-25 RX ORDER — HYDROCODONE BITARTRATE AND ACETAMINOPHEN 5; 325 MG/1; MG/1
1 TABLET ORAL EVERY 4 HOURS PRN
Qty: 10 TABLET | Refills: 0 | Status: SHIPPED | OUTPATIENT
Start: 2019-02-25 | End: 2019-02-28

## 2019-02-25 RX ORDER — PHENAZOPYRIDINE HYDROCHLORIDE 100 MG/1
100 TABLET, FILM COATED ORAL 3 TIMES DAILY PRN
Qty: 30 TABLET | Refills: 0 | Status: SHIPPED | OUTPATIENT
Start: 2019-02-25 | End: 2019-02-28

## 2019-02-25 RX ORDER — ESTRADIOL 0.1 MG/G
CREAM VAGINAL
Qty: 1 TUBE | Refills: 3 | Status: SHIPPED | OUTPATIENT
Start: 2019-02-25 | End: 2020-08-06

## 2019-02-25 RX ORDER — PROPOFOL 10 MG/ML
INJECTION, EMULSION INTRAVENOUS PRN
Status: DISCONTINUED | OUTPATIENT
Start: 2019-02-25 | End: 2019-02-25 | Stop reason: SDUPTHER

## 2019-02-25 RX ORDER — FENTANYL CITRATE 50 UG/ML
INJECTION, SOLUTION INTRAMUSCULAR; INTRAVENOUS PRN
Status: DISCONTINUED | OUTPATIENT
Start: 2019-02-25 | End: 2019-02-25 | Stop reason: SDUPTHER

## 2019-02-25 RX ORDER — SODIUM CHLORIDE 0.9 % (FLUSH) 0.9 %
10 SYRINGE (ML) INJECTION EVERY 12 HOURS SCHEDULED
Status: DISCONTINUED | OUTPATIENT
Start: 2019-02-25 | End: 2019-02-25 | Stop reason: HOSPADM

## 2019-02-25 RX ORDER — LIDOCAINE HYDROCHLORIDE 20 MG/ML
INJECTION, SOLUTION EPIDURAL; INFILTRATION; INTRACAUDAL; PERINEURAL PRN
Status: DISCONTINUED | OUTPATIENT
Start: 2019-02-25 | End: 2019-02-25 | Stop reason: SDUPTHER

## 2019-02-25 RX ADMIN — SODIUM CHLORIDE: 9 INJECTION, SOLUTION INTRAVENOUS at 07:32

## 2019-02-25 RX ADMIN — HYDROCODONE BITARTRATE AND ACETAMINOPHEN 1 TABLET: 5; 325 TABLET ORAL at 09:01

## 2019-02-25 RX ADMIN — LIDOCAINE HYDROCHLORIDE 20 MG: 20 INJECTION, SOLUTION EPIDURAL; INFILTRATION; INTRACAUDAL; PERINEURAL at 07:35

## 2019-02-25 RX ADMIN — PROPOFOL 100 MCG/KG/MIN: 10 INJECTION, EMULSION INTRAVENOUS at 07:35

## 2019-02-25 RX ADMIN — PROPOFOL 50 MG: 10 INJECTION, EMULSION INTRAVENOUS at 07:35

## 2019-02-25 RX ADMIN — FENTANYL CITRATE 50 MCG: 50 INJECTION, SOLUTION INTRAMUSCULAR; INTRAVENOUS at 07:35

## 2019-02-25 RX ADMIN — MIDAZOLAM 2 MG: 1 INJECTION INTRAMUSCULAR; INTRAVENOUS at 07:31

## 2019-02-25 RX ADMIN — CIPROFLOXACIN 400 MG: 2 INJECTION INTRAVENOUS at 07:32

## 2019-02-25 RX ADMIN — ONDANSETRON HYDROCHLORIDE 4 MG: 2 INJECTION, SOLUTION INTRAMUSCULAR; INTRAVENOUS at 07:50

## 2019-02-25 ASSESSMENT — PAIN DESCRIPTION - PROGRESSION: CLINICAL_PROGRESSION: NOT CHANGED

## 2019-02-25 ASSESSMENT — PULMONARY FUNCTION TESTS
PIF_VALUE: 0
PIF_VALUE: 1
PIF_VALUE: 0
PIF_VALUE: 0
PIF_VALUE: 1
PIF_VALUE: 0
PIF_VALUE: 1
PIF_VALUE: 0
PIF_VALUE: 1

## 2019-02-25 ASSESSMENT — PAIN SCALES - GENERAL
PAINLEVEL_OUTOF10: 4
PAINLEVEL_OUTOF10: 3
PAINLEVEL_OUTOF10: 4
PAINLEVEL_OUTOF10: 4

## 2019-02-25 ASSESSMENT — PAIN DESCRIPTION - DESCRIPTORS
DESCRIPTORS: THROBBING
DESCRIPTORS: CONSTANT;CRAMPING

## 2019-02-25 ASSESSMENT — PAIN DESCRIPTION - ORIENTATION: ORIENTATION: LOWER

## 2019-02-25 ASSESSMENT — PAIN DESCRIPTION - LOCATION: LOCATION: ABDOMEN

## 2019-02-25 ASSESSMENT — PAIN - FUNCTIONAL ASSESSMENT: PAIN_FUNCTIONAL_ASSESSMENT: 0-10

## 2019-02-25 ASSESSMENT — PAIN DESCRIPTION - FREQUENCY: FREQUENCY: CONTINUOUS

## 2019-02-25 ASSESSMENT — PAIN DESCRIPTION - PAIN TYPE: TYPE: SURGICAL PAIN

## 2019-02-25 ASSESSMENT — PAIN DESCRIPTION - ONSET: ONSET: ON-GOING

## 2019-03-18 ENCOUNTER — OFFICE VISIT (OUTPATIENT)
Dept: ORTHOPEDIC SURGERY | Age: 64
End: 2019-03-18
Payer: MEDICARE

## 2019-03-18 VITALS — BODY MASS INDEX: 24.59 KG/M2 | WEIGHT: 153 LBS | HEIGHT: 66 IN

## 2019-03-18 DIAGNOSIS — M75.21 BICEPS TENDONITIS ON RIGHT: ICD-10-CM

## 2019-03-18 DIAGNOSIS — M75.41 SHOULDER IMPINGEMENT, RIGHT: Primary | ICD-10-CM

## 2019-03-18 DIAGNOSIS — M75.111 INCOMPLETE TEAR OF RIGHT ROTATOR CUFF: ICD-10-CM

## 2019-03-18 PROCEDURE — 99213 OFFICE O/P EST LOW 20 MIN: CPT | Performed by: ORTHOPAEDIC SURGERY

## 2019-03-18 PROCEDURE — 20610 DRAIN/INJ JOINT/BURSA W/O US: CPT | Performed by: ORTHOPAEDIC SURGERY

## 2019-03-18 RX ORDER — TRIAMCINOLONE ACETONIDE 40 MG/ML
40 INJECTION, SUSPENSION INTRA-ARTICULAR; INTRAMUSCULAR ONCE
Status: COMPLETED | OUTPATIENT
Start: 2019-03-18 | End: 2019-03-18

## 2019-03-18 RX ADMIN — TRIAMCINOLONE ACETONIDE 40 MG: 40 INJECTION, SUSPENSION INTRA-ARTICULAR; INTRAMUSCULAR at 10:36

## 2019-04-18 ENCOUNTER — OFFICE VISIT (OUTPATIENT)
Dept: CARDIOLOGY CLINIC | Age: 64
End: 2019-04-18
Payer: MEDICARE

## 2019-04-18 VITALS
WEIGHT: 150 LBS | SYSTOLIC BLOOD PRESSURE: 110 MMHG | HEART RATE: 66 BPM | HEIGHT: 66 IN | BODY MASS INDEX: 24.11 KG/M2 | DIASTOLIC BLOOD PRESSURE: 58 MMHG

## 2019-04-18 DIAGNOSIS — I34.1 MITRAL VALVE PROLAPSE: Primary | ICD-10-CM

## 2019-04-18 DIAGNOSIS — K21.9 HIATAL HERNIA WITH GASTROESOPHAGEAL REFLUX: ICD-10-CM

## 2019-04-18 DIAGNOSIS — Z86.79 H/O PERICARDITIS: ICD-10-CM

## 2019-04-18 DIAGNOSIS — M75.41 IMPINGEMENT SYNDROME OF RIGHT SHOULDER: ICD-10-CM

## 2019-04-18 DIAGNOSIS — M85.80 OSTEOPENIA, UNSPECIFIED LOCATION: ICD-10-CM

## 2019-04-18 DIAGNOSIS — M79.7 FIBROMYALGIA: ICD-10-CM

## 2019-04-18 DIAGNOSIS — Z87.19 H/O DIVERTICULITIS OF COLON: ICD-10-CM

## 2019-04-18 DIAGNOSIS — Z85.3 H/O MALIGNANT NEOPLASM OF FEMALE BREAST: ICD-10-CM

## 2019-04-18 DIAGNOSIS — F31.9 BIPOLAR DEPRESSION (HCC): ICD-10-CM

## 2019-04-18 DIAGNOSIS — K44.9 HIATAL HERNIA WITH GASTROESOPHAGEAL REFLUX: ICD-10-CM

## 2019-04-18 DIAGNOSIS — K76.0 FATTY LIVER: ICD-10-CM

## 2019-04-18 DIAGNOSIS — Z87.01 H/O: PNEUMONIA: ICD-10-CM

## 2019-04-18 DIAGNOSIS — M75.101 ROTATOR CUFF TEAR ARTHROPATHY OF RIGHT SHOULDER: ICD-10-CM

## 2019-04-18 DIAGNOSIS — M12.811 ROTATOR CUFF TEAR ARTHROPATHY OF RIGHT SHOULDER: ICD-10-CM

## 2019-04-18 DIAGNOSIS — E78.2 MIXED HYPERLIPIDEMIA: ICD-10-CM

## 2019-04-18 DIAGNOSIS — F20.89 OTHER SCHIZOPHRENIA (HCC): ICD-10-CM

## 2019-04-18 DIAGNOSIS — E11.8 TYPE 2 DIABETES MELLITUS WITH COMPLICATION, WITHOUT LONG-TERM CURRENT USE OF INSULIN (HCC): ICD-10-CM

## 2019-04-18 DIAGNOSIS — K58.8 OTHER IRRITABLE BOWEL SYNDROME: ICD-10-CM

## 2019-04-18 DIAGNOSIS — I34.0 NON-RHEUMATIC MITRAL REGURGITATION: ICD-10-CM

## 2019-04-18 DIAGNOSIS — J45.20 MILD INTERMITTENT ASTHMA WITHOUT COMPLICATION: ICD-10-CM

## 2019-04-18 DIAGNOSIS — M47.819 ARTHRITIS OF SPINE: ICD-10-CM

## 2019-04-18 DIAGNOSIS — I35.8 AORTIC VALVE SCLEROSIS: ICD-10-CM

## 2019-04-18 PROCEDURE — 93000 ELECTROCARDIOGRAM COMPLETE: CPT | Performed by: INTERNAL MEDICINE

## 2019-04-18 PROCEDURE — 99213 OFFICE O/P EST LOW 20 MIN: CPT | Performed by: INTERNAL MEDICINE

## 2019-06-03 ENCOUNTER — OFFICE VISIT (OUTPATIENT)
Dept: ONCOLOGY | Age: 64
End: 2019-06-03
Payer: MEDICARE

## 2019-06-03 ENCOUNTER — HOSPITAL ENCOUNTER (OUTPATIENT)
Dept: INFUSION THERAPY | Age: 64
Discharge: HOME | End: 2019-06-03
Payer: MEDICARE

## 2019-06-03 VITALS
HEIGHT: 66 IN | RESPIRATION RATE: 20 BRPM | SYSTOLIC BLOOD PRESSURE: 115 MMHG | DIASTOLIC BLOOD PRESSURE: 78 MMHG | HEART RATE: 80 BPM | WEIGHT: 148.2 LBS | BODY MASS INDEX: 23.82 KG/M2 | TEMPERATURE: 97.9 F

## 2019-06-03 DIAGNOSIS — Z17.0 ER+ (ESTROGEN RECEPTOR POSITIVE STATUS): ICD-10-CM

## 2019-06-03 DIAGNOSIS — D50.0 IRON DEFICIENCY ANEMIA DUE TO CHRONIC BLOOD LOSS: ICD-10-CM

## 2019-06-03 DIAGNOSIS — C50.212 MALIGNANT NEOPLASM OF UPPER-INNER QUADRANT OF LEFT FEMALE BREAST, UNSPECIFIED ESTROGEN RECEPTOR STATUS (HCC): ICD-10-CM

## 2019-06-03 DIAGNOSIS — C50.212 MALIGNANT NEOPLASM OF UPPER-INNER QUADRANT OF LEFT FEMALE BREAST, UNSPECIFIED ESTROGEN RECEPTOR STATUS (HCC): Primary | ICD-10-CM

## 2019-06-03 LAB
ALBUMIN SERPL-MCNC: 4.1 G/DL (ref 3.5–5.2)
ALP BLD-CCNC: 48 U/L (ref 35–104)
ALT SERPL-CCNC: 19 U/L (ref 0–32)
ANION GAP SERPL CALCULATED.3IONS-SCNC: 14 MMOL/L (ref 7–16)
AST SERPL-CCNC: 26 U/L (ref 0–31)
BASOPHILS ABSOLUTE: 0.03 E9/L (ref 0–0.2)
BASOPHILS RELATIVE PERCENT: 0.4 % (ref 0–2)
BILIRUB SERPL-MCNC: 0.2 MG/DL (ref 0–1.2)
BUN BLDV-MCNC: 10 MG/DL (ref 8–23)
CALCIUM SERPL-MCNC: 9.4 MG/DL (ref 8.6–10.2)
CHLORIDE BLD-SCNC: 99 MMOL/L (ref 98–107)
CO2: 26 MMOL/L (ref 22–29)
CREAT SERPL-MCNC: 0.7 MG/DL (ref 0.5–1)
EOSINOPHILS ABSOLUTE: 0.09 E9/L (ref 0.05–0.5)
EOSINOPHILS RELATIVE PERCENT: 1.3 % (ref 0–6)
FERRITIN: 26 NG/ML
GFR AFRICAN AMERICAN: >60
GFR NON-AFRICAN AMERICAN: >60 ML/MIN/1.73
GLUCOSE BLD-MCNC: 158 MG/DL (ref 74–99)
HCT VFR BLD CALC: 39.9 % (ref 34–48)
HEMOGLOBIN: 13.3 G/DL (ref 11.5–15.5)
IMMATURE GRANULOCYTES #: 0.02 E9/L
IMMATURE GRANULOCYTES %: 0.3 % (ref 0–5)
IRON SATURATION: 19 % (ref 15–50)
IRON: 69 MCG/DL (ref 37–145)
LYMPHOCYTES ABSOLUTE: 2.6 E9/L (ref 1.5–4)
LYMPHOCYTES RELATIVE PERCENT: 36.8 % (ref 20–42)
MCH RBC QN AUTO: 31.1 PG (ref 26–35)
MCHC RBC AUTO-ENTMCNC: 33.3 % (ref 32–34.5)
MCV RBC AUTO: 93.4 FL (ref 80–99.9)
MONOCYTES ABSOLUTE: 0.59 E9/L (ref 0.1–0.95)
MONOCYTES RELATIVE PERCENT: 8.4 % (ref 2–12)
NEUTROPHILS ABSOLUTE: 3.73 E9/L (ref 1.8–7.3)
NEUTROPHILS RELATIVE PERCENT: 52.8 % (ref 43–80)
PDW BLD-RTO: 13.7 FL (ref 11.5–15)
PLATELET # BLD: 279 E9/L (ref 130–450)
PMV BLD AUTO: 8.6 FL (ref 7–12)
POTASSIUM SERPL-SCNC: 4.1 MMOL/L (ref 3.5–5)
RBC # BLD: 4.27 E12/L (ref 3.5–5.5)
SODIUM BLD-SCNC: 139 MMOL/L (ref 132–146)
TOTAL IRON BINDING CAPACITY: 368 MCG/DL (ref 250–450)
TOTAL PROTEIN: 6.9 G/DL (ref 6.4–8.3)
WBC # BLD: 7.1 E9/L (ref 4.5–11.5)

## 2019-06-03 PROCEDURE — 82728 ASSAY OF FERRITIN: CPT

## 2019-06-03 PROCEDURE — 99213 OFFICE O/P EST LOW 20 MIN: CPT | Performed by: INTERNAL MEDICINE

## 2019-06-03 PROCEDURE — 36415 COLL VENOUS BLD VENIPUNCTURE: CPT

## 2019-06-03 PROCEDURE — 80053 COMPREHEN METABOLIC PANEL: CPT

## 2019-06-03 PROCEDURE — 85025 COMPLETE CBC W/AUTO DIFF WBC: CPT

## 2019-06-03 PROCEDURE — 83550 IRON BINDING TEST: CPT

## 2019-06-03 PROCEDURE — 83540 ASSAY OF IRON: CPT

## 2019-06-24 ENCOUNTER — OFFICE VISIT (OUTPATIENT)
Dept: ORTHOPEDIC SURGERY | Age: 64
End: 2019-06-24
Payer: MEDICARE

## 2019-06-24 VITALS — BODY MASS INDEX: 23.78 KG/M2 | HEIGHT: 66 IN | WEIGHT: 148 LBS

## 2019-06-24 DIAGNOSIS — M19.019 GLENOHUMERAL ARTHRITIS: ICD-10-CM

## 2019-06-24 DIAGNOSIS — S46.011A TRAUMATIC COMPLETE TEAR OF RIGHT ROTATOR CUFF, INITIAL ENCOUNTER: Primary | ICD-10-CM

## 2019-06-24 DIAGNOSIS — M65.311 TRIGGER FINGER OF RIGHT THUMB: ICD-10-CM

## 2019-06-24 PROCEDURE — 99214 OFFICE O/P EST MOD 30 MIN: CPT | Performed by: ORTHOPAEDIC SURGERY

## 2019-06-24 PROCEDURE — 20610 DRAIN/INJ JOINT/BURSA W/O US: CPT | Performed by: ORTHOPAEDIC SURGERY

## 2019-06-24 RX ORDER — TRIAMCINOLONE ACETONIDE 40 MG/ML
40 INJECTION, SUSPENSION INTRA-ARTICULAR; INTRAMUSCULAR ONCE
Status: COMPLETED | OUTPATIENT
Start: 2019-06-24 | End: 2019-06-24

## 2019-06-24 RX ADMIN — TRIAMCINOLONE ACETONIDE 40 MG: 40 INJECTION, SUSPENSION INTRA-ARTICULAR; INTRAMUSCULAR at 11:38

## 2019-06-24 NOTE — PROGRESS NOTES
Chief Complaint   Patient presents with    Shoulder Pain     Right shoulder follow up. Previous shoulder CSI in march 2019    Hand Pain     Right hand thumb locking since December       Stana Simmonds returns today for follow up of her right shoulder pain. She had an injection in March and is feeling ok. She would like to discuss surgical intervention vs injection.     Past Medical History:   Diagnosis Date    Arthritis     spine    Asthma     Bipolar depression ([address]     Bronchitis     Cancer ([address]     breast left    Cerebral artery occlusion with cerebral infarction ([address]     tia 11 yrs ago    Colitis     Diabetes mellitus ([address]     Diverticulitis     Fibromyalgia     GERD (gastroesophageal reflux disease)     Herpes     herpes 2 genitalia    Hiatal hernia     History of blood transfusion     History of cardiovascular stress test 2/15/14    lexiscan    Hyperlipidemia     Hypertension     Hypertensive cardiovascular disease 2/4/2013    IBS (irritable bowel syndrome)     Liver disease     Steato Hepatitis (fatty liver)    Migraines     Neuropathy     of feet    Osteopenia     Pancreatitis     Pericarditis [id number]    Pneumonia oct 1991    PONV (postoperative nausea and vomiting)     Stomach ulcer     TIA (transient ischemic attack) 2008     Past Surgical History:   Procedure Laterality Date    ABDOMEN SURGERY  march 2010    polyp removal    ABDOMEN SURGERY      exploratory for scar tissue    APPENDECTOMY      BREAST SURGERY  oct 26 2010, nov 11 2010    lumpectomy left    BREAST SURGERY Left 10/14/2015    excision palpable left breast lesion     CARDIAC SURGERY      heart cath many yrs ago states was normal    CATARACT REMOVAL WITH IMPLANT Left 2/26/13    /with PC IOL    CATARACT REMOVAL WITH IMPLANT Right 3 5 13    CHOLECYSTECTOMY      COLON SURGERY  oct 2013    COLONOSCOPY      cauterized avm    CYSTOSCOPY  11/04/2016    retrograde pylogram/Dr. Barron Living    CYSTOSCOPY W BIOPSY OF PSEUDOEPHEDRINE-DM PO, Take by mouth, Disp: , Rfl:     ondansetron (ZOFRAN) 4 MG tablet, Take 4 mg by mouth every 8 hours as needed for Nausea or Vomiting, Disp: , Rfl:     propranolol (INDERAL) 20 MG tablet, Take 40 mg by mouth 2 times daily 2 tablets as needed, Disp: , Rfl:     lidocaine (XYLOCAINE) 5 % ointment, Apply topically as needed. , Disp: 3 Tube, Rfl: 0    sucralfate (CARAFATE) 1 GM tablet, Take 1 g by mouth 2 times daily , Disp: , Rfl:     valACYclovir (VALTREX) 1 G tablet, Take 1,000 mg by mouth 2 times daily As needed, Disp: , Rfl:     b complex vitamins capsule, Take 1 capsule by mouth daily, Disp: , Rfl:     diphenhydrAMINE (BENADRYL) 25 MG capsule, Take 25 mg by mouth as needed 1 OR 2 TABLETS  D/T MIGRAINE, Disp: , Rfl:     montelukast (SINGULAIR) 10 MG tablet, Take 10 mg by mouth nightly., Disp: , Rfl:     simvastatin (ZOCOR) 20 MG tablet, Take 20 mg by mouth every morning (before breakfast) , Disp: , Rfl:     ranitidine (ZANTAC) 300 MG tablet, Take 300 mg by mouth nightly., Disp: , Rfl:     Acetaminophen (TYLENOL ARTHRITIS EXT RELIEF PO), Take 2 capsules by mouth 3 times daily , Disp: , Rfl:     vitamin E 400 UNIT capsule, Take 400 Units by mouth daily. , Disp: , Rfl:     ascorbic acid (VITAMIN C) 500 MG tablet, Take 500 mg by mouth 2 times daily. , Disp: , Rfl:     Lutein 40 MG CAPS, Take 1 capsule by mouth daily , Disp: , Rfl:     Lycopene 10 MG CAPS, Take 1 capsule by mouth daily. , Disp: , Rfl:     Potassium 99 MG TABS, Take 1 capsule by mouth daily. , Disp: , Rfl:     metformin (GLUMETZA) 500 MG (MOD) ER tablet, Take 1,000 mg by mouth 2 times daily (with meals). , Disp: , Rfl:     CALCIUM-VITAMIN D PO, Take 1 tablet by mouth daily 1200 mg/ 25mg, Disp: , Rfl:     losartan (COZAAR) 25 MG tablet, Take 25 mg by mouth daily.   , Disp: , Rfl:     Multiple Vitamin (MULTIVITAMIN PO), Take 1 tablet by mouth once., Disp: , Rfl:     fish oil-omega-3 fatty acids 1000 MG capsule, Take 1 g by Disease Mother     Stroke Father        REVIEW OF SYSTEMS:     General/Constitution:  (-)weight loss, (-)fever, (-)chills, (-)weakness. Skin: (-) rash,(-) psoriasis,(-) eczema, (-)skin cancer. Musculoskeletal: (-) fractures,  (-) dislocations,(-) collagen vascular disease, (-) fibromyalgia, (-) multiple sclerosis, (-) muscular dystrophy, (-) RSD,(-) joint pain (-)swelling, (-) joint pain,swelling. Neurologic: (-) epilepsy, (-)seizures,(-) brain tumor,(-) TIA, (-)stroke, (-)headaches, (-)Parkinson disease,(-) memory loss, (-) LOC. Cardiovascular: (-) Chest pain, (-) swelling in legs/feet, (-) SOB, (-) cramping in legs/feet with walking. Respiratory: (-) SOB, (-) Coughing, (-) night sweats. GI: (-) nausea, (-) vomiting, (-) diarrhea, (-) blood in stool, (-) gastric ulcer. Psychiatric: (-) Depression, (-) Anxiety, (-) bipolar disease, (-) Alzheimer's Disease  Allergic/Immunologic: (-) allergies latex, (-) allergies metal, (-) skin sensitivity. Hematlogic: (-) anemia, (-) blood transfusion, (-) DVT/PE, (-) Clotting disorders    SUBJECTIVE:    Constitution:  The patient is alert and oriented x 3, appears to be stated age and in no distress. @VS    Skin:  Upon inspection: the skin appears warm, dry and intact. There is not a previous scar over the affected area. There is not any cellulitis, lymphedema or cutaneous lesions noted in the lower extremities. Upon palpation there is no induration noted. Neurologic:  Gait: normal;  Motor exam of the upper extremities show: The reflexes in biceps/triceps/brachioradialis are equal and symmetric. Sensory exam C5-T1 are normal bilaterally, except   . Cardiovascular: The vascular exam is normal and is well perfused to distal extremities. There are 2+ radial pulses bilaterally, and motor and sensation is intact to median, ulnar, and radial, musclocutaneus, and axillary nerve distribution and grossly symmetric bilaterally.   There is cap refill noted less than two seconds in all digits. There is not edema of the bilateral upper extremities. There is not varicosities noted in the distal extremities. Lymph:  Upon palpation,  there is no lymphadenopathy noted in bilateral upper extremities. Musculoskeletal:  Gait: normal; examination of the nails and digits reveal no cyanosis or clubbing. Cervical Exam:  On physical exam, Natahly Montoya is well-developed, well-nourished, oriented to person, place and time. her gait is normal.  On evaluation of her cervical spine, she has full range of motion of the cervical spine without pain. There is no cervical tenderness to palpation. Shoulder Exam:   On evaluation of her bilaterally upper extremities, her right shoulder has no deformity. There is tenderness upon palpation of the anterior shoulder. There is not evidence of scapular dyskinesis. There is not muscle atrophy in shoulder girdle. The range of motion for the Right Shoulder is 150/45/t10 and for the Left shoulder is 150/45/t10. Right shoulder Motor strength is 5/5 in the supraspinatus, 5/5 internal rotation and 5/5 in external rotation, and Left shoulder motor strength 5/5 in supraspinatus, 5/5 in internal rotation, 5/5 in external rotation. Right shoulder:  positive Impingement , positive Holliday ,positive  Speeds,negative  Apprehension ,negative Owen Load Shift, negative Jeffery manuver, negative Cross arm test.     Left shoulder:  negative Impingement , negative Holliday ,negative  Speeds,negative  Apprehension ,negative Owen Load Shift, negative Jeffery manuver, negative Cross arm test.     Hand exam:  The skin overlying the hand is  intact. There is not evidence of scar, lesion, laceration, or abrasion. The motion in the small joints of the hand are intact with no stiffness or deformity. The ROM in the MCP flexion diminished/ extension diminished , PIP flexion NWL/ extension WNL, DIP flexion WNL/ extension WNL.   There is not rotational deformity. There is no masses or adenopathy in bilateral upper extremities. Radial pulses are 2+ and symmetric bilaterally. Capillary refill is intact and < 2 seconds. Motor strength is 5/5 with flexion and extension of the small finger joints. Patient has a right trigger thumb. Right:  Phallens sign positive, Tinnells sign negative, Median nerve compression test negative ,  Finklesteins negative, CMC Grind test negative, Piano Key Test negative. Left:  Phallens sign negative, Tinnells sign negative, Median nerve compression test negative ,  Finklesteins negative, CMC Grind test negative, Piano Key Test negative. X-ray:  None today    MRI:  None today to review    Radiographic findings reviewed with patient        Impression:       Encounter Diagnoses   Name Primary?  Traumatic complete tear of right rotator cuff, initial encounter Yes    Glenohumeral arthritis     Trigger finger of right thumb        Plan:  Natural history and expected course discussed. Questions answered. Educational material distributed. Reduction in offending activity. Gentle ROM exercises  I had a lengthy discussion with the patient regarding their diagnosis. I explained treatment options including surgical vs non surgical treatment. I reviewed in detail the risks and benefits and outlined the procedure in detail with expected outcomes and possible complications. I also discussed non surgical treatment such as injections (CSI ), physical therapy, topical creams and NSAID's. They have elected for conservative management at this time. She would like to wait until later this year for her shoulder arthroscopy. I will proceed with a cortisone injection in the Right shoulder. Verbal and written consent was obtained for the injection. Skin was prepped with alcohol, 1 ml of Kenalog 40mg and 9 ml of 0.25% Marcaine was injected into the anterior aspect into the glenohumeral joint of the Right shoulder.  The patient tolerated the injections well. I will see the patient back prn. We will perform a Right trigger finger release of the thumb on 07/16/2019. The risks and benefits were reviewed with the patient such as:  DVT, infection,  injuries to blood vessels and nerves, non relief of symptoms, continued pain, worsening of symptoms.

## 2019-07-14 ENCOUNTER — ANESTHESIA EVENT (OUTPATIENT)
Dept: OPERATING ROOM | Age: 64
End: 2019-07-14
Payer: MEDICARE

## 2019-07-14 ASSESSMENT — LIFESTYLE VARIABLES: SMOKING_STATUS: 0

## 2019-07-14 NOTE — ANESTHESIA PRE PROCEDURE
Yes Historical Provider, MD   propranolol (INDERAL) 20 MG tablet Take 40 mg by mouth 2 times daily 2 tablets as needed   Yes Historical Provider, MD   lidocaine (XYLOCAINE) 5 % ointment Apply topically as needed. 4/19/17  Yes Guillaume Medinausing, APRN - CNP   sucralfate (CARAFATE) 1 GM tablet Take 1 g by mouth 2 times daily    Yes Historical Provider, MD   valACYclovir (VALTREX) 1 G tablet Take 1,000 mg by mouth 2 times daily As needed   Yes Historical Provider, MD   b complex vitamins capsule Take 1 capsule by mouth daily   Yes Historical Provider, MD   diphenhydrAMINE (BENADRYL) 25 MG capsule Take 25 mg by mouth as needed 1 OR 2 TABLETS  D/T MIGRAINE   Yes Historical Provider, MD   montelukast (SINGULAIR) 10 MG tablet Take 10 mg by mouth nightly. Yes Historical Provider, MD   simvastatin (ZOCOR) 20 MG tablet Take 20 mg by mouth every morning (before breakfast)    Yes Historical Provider, MD   ranitidine (ZANTAC) 300 MG tablet Take 300 mg by mouth nightly. Yes Historical Provider, MD   Acetaminophen (TYLENOL ARTHRITIS EXT RELIEF PO) Take 2 capsules by mouth 3 times daily    Yes Historical Provider, MD   vitamin E 400 UNIT capsule Take 400 Units by mouth daily. Yes Historical Provider, MD   ascorbic acid (VITAMIN C) 500 MG tablet Take 500 mg by mouth 2 times daily. Yes Historical Provider, MD   Lutein 40 MG CAPS Take 1 capsule by mouth daily    Yes Historical Provider, MD   Lycopene 10 MG CAPS Take 1 capsule by mouth daily. Yes Historical Provider, MD   Potassium 99 MG TABS Take 1 capsule by mouth daily. Yes Historical Provider, MD   metformin (GLUMETZA) 500 MG (MOD) ER tablet Take 1,000 mg by mouth 2 times daily (with meals). Yes Historical Provider, MD   CALCIUM-VITAMIN D PO Take 1 tablet by mouth daily 1200 mg/ 25mg   Yes Historical Provider, MD   losartan (COZAAR) 25 MG tablet Take 25 mg by mouth daily. Yes Historical Provider, MD   Multiple Vitamin (MULTIVITAMIN PO) Take 1 tablet by mouth once. 06/03/19 148 lb 3.2 oz (67.2 kg)     Body mass index is 23.73 kg/m². CBC:   Lab Results   Component Value Date    WBC 7.1 06/03/2019    RBC 4.27 06/03/2019    HGB 13.3 06/03/2019    HCT 39.9 06/03/2019    MCV 93.4 06/03/2019    RDW 13.7 06/03/2019     06/03/2019       CMP:   Lab Results   Component Value Date     06/03/2019    K 4.1 06/03/2019    K 5.0 02/18/2019    CL 99 06/03/2019    CO2 26 06/03/2019    BUN 10 06/03/2019    CREATININE 0.7 06/03/2019    GFRAA >60 06/03/2019    LABGLOM >60 06/03/2019    GLUCOSE 158 06/03/2019    GLUCOSE 170 02/08/2012    PROT 6.9 06/03/2019    CALCIUM 9.4 06/03/2019    BILITOT 0.2 06/03/2019    ALKPHOS 48 06/03/2019    AST 26 06/03/2019    ALT 19 06/03/2019       POC Tests: No results for input(s): POCGLU, POCNA, POCK, POCCL, POCBUN, POCHEMO, POCHCT in the last 72 hours. Coags:   Lab Results   Component Value Date    PROTIME 13.5 10/29/2013    PROTIME 14.3 03/08/2011    INR 1.4 10/29/2013    APTT 27.1 10/29/2013       HCG (If Applicable): No results found for: PREGTESTUR, PREGSERUM, HCG, HCGQUANT     ABGs: No results found for: PHART, PO2ART, OGO4VKC, SBS7AJP, BEART, D5MUJSHB     Type & Screen (If Applicable):  No results found for: LABABO, [address]    Anesthesia Evaluation  Patient summary reviewed and Nursing notes reviewed no history of anesthetic complications:   Airway: Mallampati: III  TM distance: >3 FB   Neck ROM: full  Mouth opening: > = 3 FB Dental:    (+) upper dentures and partials  Comment: Full upper, partial lower. Pulmonary:normal exam  breath sounds clear to auscultation  (+) asthma:     (-) not a current smoker                           Cardiovascular:    (+) hypertension:, hyperlipidemia        Rhythm: regular  Rate: normal                 ROS comment: EKG 4/2019=NSR     Neuro/Psych:   (+) neuromuscular disease:, TIA (11 years ago.  No deficits), headaches: migraine headaches, psychiatric history (Bipolar): stable with treatment ROS comment: Neuropathic pain GI/Hepatic/Renal:   (+) hiatal hernia, GERD: well controlled, PUD, liver disease (Fatty Liver):,          ROS comment: PONV  Diverticulitis  Pancreatitis  . Endo/Other:    (+) Diabetes ( this morning)Type II DM, , malignancy/cancer (Left breast). Pt had no PAT visit       Abdominal:         (-) obese     Vascular:                                        Anesthesia Plan      Jennifer block     ASA 3       Induction: intravenous. Anesthetic plan and risks discussed with patient. Plan discussed with CRNA. Gabi Zamora MD,  7/14/2019    DOS STAFF ADDENDUM:    Pt seen and examined, chart reviewed (including anesthesia, drug and allergy history). Anesthetic plan, risks, benefits, alternatives, and personnel involved discussed with patient. Patient verbalized an understanding and agrees to proceed. Plan discussed with care team members and agreed upon.     Robert Betancourt MD  Staff Anesthesiologist  6:43 AM

## 2019-07-16 ENCOUNTER — ANESTHESIA (OUTPATIENT)
Dept: OPERATING ROOM | Age: 64
End: 2019-07-16
Payer: MEDICARE

## 2019-07-16 ENCOUNTER — HOSPITAL ENCOUNTER (OUTPATIENT)
Age: 64
Setting detail: OUTPATIENT SURGERY
Discharge: HOME | End: 2019-07-16
Attending: ORTHOPAEDIC SURGERY | Admitting: ORTHOPAEDIC SURGERY
Payer: MEDICARE

## 2019-07-16 VITALS
DIASTOLIC BLOOD PRESSURE: 68 MMHG | OXYGEN SATURATION: 96 % | RESPIRATION RATE: 13 BRPM | TEMPERATURE: 98.6 F | SYSTOLIC BLOOD PRESSURE: 112 MMHG

## 2019-07-16 VITALS
WEIGHT: 147 LBS | OXYGEN SATURATION: 97 % | BODY MASS INDEX: 23.63 KG/M2 | DIASTOLIC BLOOD PRESSURE: 69 MMHG | RESPIRATION RATE: 18 BRPM | HEART RATE: 76 BPM | SYSTOLIC BLOOD PRESSURE: 122 MMHG | HEIGHT: 66 IN | TEMPERATURE: 98 F

## 2019-07-16 DIAGNOSIS — M65.311 TRIGGER FINGER OF RIGHT THUMB: Primary | ICD-10-CM

## 2019-07-16 LAB — METER GLUCOSE: 120 MG/DL (ref 74–99)

## 2019-07-16 PROCEDURE — 7100000010 HC PHASE II RECOVERY - FIRST 15 MIN: Performed by: ORTHOPAEDIC SURGERY

## 2019-07-16 PROCEDURE — 2500000003 HC RX 250 WO HCPCS: Performed by: NURSE ANESTHETIST, CERTIFIED REGISTERED

## 2019-07-16 PROCEDURE — 2500000003 HC RX 250 WO HCPCS: Performed by: ORTHOPAEDIC SURGERY

## 2019-07-16 PROCEDURE — 6360000002 HC RX W HCPCS: Performed by: NURSE ANESTHETIST, CERTIFIED REGISTERED

## 2019-07-16 PROCEDURE — 7100000011 HC PHASE II RECOVERY - ADDTL 15 MIN: Performed by: ORTHOPAEDIC SURGERY

## 2019-07-16 PROCEDURE — 3600000002 HC SURGERY LEVEL 2 BASE: Performed by: ORTHOPAEDIC SURGERY

## 2019-07-16 PROCEDURE — 3700000001 HC ADD 15 MINUTES (ANESTHESIA): Performed by: ORTHOPAEDIC SURGERY

## 2019-07-16 PROCEDURE — 82962 GLUCOSE BLOOD TEST: CPT

## 2019-07-16 PROCEDURE — 3700000000 HC ANESTHESIA ATTENDED CARE: Performed by: ORTHOPAEDIC SURGERY

## 2019-07-16 PROCEDURE — 3600000012 HC SURGERY LEVEL 2 ADDTL 15MIN: Performed by: ORTHOPAEDIC SURGERY

## 2019-07-16 PROCEDURE — 26055 INCISE FINGER TENDON SHEATH: CPT | Performed by: ORTHOPAEDIC SURGERY

## 2019-07-16 PROCEDURE — 2709999900 HC NON-CHARGEABLE SUPPLY: Performed by: ORTHOPAEDIC SURGERY

## 2019-07-16 PROCEDURE — 2580000003 HC RX 258: Performed by: ANESTHESIOLOGY

## 2019-07-16 RX ORDER — HYDROMORPHONE HYDROCHLORIDE 1 MG/ML
0.5 INJECTION, SOLUTION INTRAMUSCULAR; INTRAVENOUS; SUBCUTANEOUS EVERY 5 MIN PRN
Status: DISCONTINUED | OUTPATIENT
Start: 2019-07-16 | End: 2019-07-16 | Stop reason: HOSPADM

## 2019-07-16 RX ORDER — ONDANSETRON 2 MG/ML
INJECTION INTRAMUSCULAR; INTRAVENOUS PRN
Status: DISCONTINUED | OUTPATIENT
Start: 2019-07-16 | End: 2019-07-16 | Stop reason: SDUPTHER

## 2019-07-16 RX ORDER — MIDAZOLAM HYDROCHLORIDE 1 MG/ML
INJECTION INTRAMUSCULAR; INTRAVENOUS PRN
Status: DISCONTINUED | OUTPATIENT
Start: 2019-07-16 | End: 2019-07-16 | Stop reason: SDUPTHER

## 2019-07-16 RX ORDER — OXYCODONE HYDROCHLORIDE AND ACETAMINOPHEN 5; 325 MG/1; MG/1
1 TABLET ORAL EVERY 6 HOURS PRN
Qty: 28 TABLET | Refills: 0 | Status: SHIPPED | OUTPATIENT
Start: 2019-07-16 | End: 2019-07-23

## 2019-07-16 RX ORDER — PROPOFOL 10 MG/ML
INJECTION, EMULSION INTRAVENOUS CONTINUOUS PRN
Status: DISCONTINUED | OUTPATIENT
Start: 2019-07-16 | End: 2019-07-16 | Stop reason: SDUPTHER

## 2019-07-16 RX ORDER — FENTANYL CITRATE 50 UG/ML
25 INJECTION, SOLUTION INTRAMUSCULAR; INTRAVENOUS EVERY 5 MIN PRN
Status: DISCONTINUED | OUTPATIENT
Start: 2019-07-16 | End: 2019-07-16 | Stop reason: HOSPADM

## 2019-07-16 RX ORDER — MORPHINE SULFATE 2 MG/ML
2 INJECTION, SOLUTION INTRAMUSCULAR; INTRAVENOUS EVERY 5 MIN PRN
Status: DISCONTINUED | OUTPATIENT
Start: 2019-07-16 | End: 2019-07-16 | Stop reason: HOSPADM

## 2019-07-16 RX ORDER — ALFENTANIL HYDROCHLORIDE 500 UG/ML
INJECTION INTRAVENOUS PRN
Status: DISCONTINUED | OUTPATIENT
Start: 2019-07-16 | End: 2019-07-16 | Stop reason: SDUPTHER

## 2019-07-16 RX ORDER — LIDOCAINE HYDROCHLORIDE 5 MG/ML
INJECTION, SOLUTION INFILTRATION; INTRAVENOUS PRN
Status: DISCONTINUED | OUTPATIENT
Start: 2019-07-16 | End: 2019-07-16 | Stop reason: SDUPTHER

## 2019-07-16 RX ORDER — SODIUM CHLORIDE, SODIUM LACTATE, POTASSIUM CHLORIDE, CALCIUM CHLORIDE 600; 310; 30; 20 MG/100ML; MG/100ML; MG/100ML; MG/100ML
INJECTION, SOLUTION INTRAVENOUS CONTINUOUS
Status: DISCONTINUED | OUTPATIENT
Start: 2019-07-16 | End: 2019-07-16 | Stop reason: HOSPADM

## 2019-07-16 RX ORDER — CLINDAMYCIN PHOSPHATE 900 MG/50ML
900 INJECTION INTRAVENOUS ONCE
Status: COMPLETED | OUTPATIENT
Start: 2019-07-16 | End: 2019-07-16

## 2019-07-16 RX ADMIN — CLINDAMYCIN PHOSPHATE 900 MG: 18 INJECTION, SOLUTION INTRAVENOUS at 07:20

## 2019-07-16 RX ADMIN — ONDANSETRON HYDROCHLORIDE 4 MG: 2 INJECTION, SOLUTION INTRAMUSCULAR; INTRAVENOUS at 07:40

## 2019-07-16 RX ADMIN — SODIUM CHLORIDE, POTASSIUM CHLORIDE, SODIUM LACTATE AND CALCIUM CHLORIDE: 600; 310; 30; 20 INJECTION, SOLUTION INTRAVENOUS at 06:52

## 2019-07-16 RX ADMIN — MIDAZOLAM 2 MG: 1 INJECTION INTRAMUSCULAR; INTRAVENOUS at 07:20

## 2019-07-16 RX ADMIN — LIDOCAINE HYDROCHLORIDE 50 ML: 5 INJECTION, SOLUTION INFILTRATION; INTRAVENOUS at 07:26

## 2019-07-16 RX ADMIN — PROPOFOL 75 MCG/KG/MIN: 10 INJECTION, EMULSION INTRAVENOUS at 07:27

## 2019-07-16 RX ADMIN — ALFENTANIL HYDROCHLORIDE 250 MCG: 500 INJECTION INTRAVENOUS at 07:22

## 2019-07-16 ASSESSMENT — PAIN SCALES - GENERAL
PAINLEVEL_OUTOF10: 0

## 2019-07-16 ASSESSMENT — PULMONARY FUNCTION TESTS
PIF_VALUE: 0

## 2019-07-16 ASSESSMENT — PAIN - FUNCTIONAL ASSESSMENT: PAIN_FUNCTIONAL_ASSESSMENT: 0-10

## 2019-07-16 NOTE — H&P
Updated H&P    Chief Complaint   Patient presents with    Shoulder Pain       Right shoulder follow up. Previous shoulder CSI in march 2019    Hand Pain       Right hand thumb locking since December         Magi Hendricks returns today for follow up of her right shoulder pain. She had an injection in March and is feeling ok. She would like to discuss surgical intervention vs injection.   There is locking of the right thumb.     Past Medical History        Past Medical History:   Diagnosis Date    Arthritis       spine    Asthma      Bipolar depression (HCC)      Bronchitis      Cancer ([address]       breast left    Cerebral artery occlusion with cerebral infarction ([address]       tia 11 yrs ago    Colitis      Diabetes mellitus ([address]      Diverticulitis      Fibromyalgia      GERD (gastroesophageal reflux disease)      Herpes       herpes 2 genitalia    Hiatal hernia      History of blood transfusion      History of cardiovascular stress test 2/15/14     lexiscan    Hyperlipidemia      Hypertension      Hypertensive cardiovascular disease 2/4/2013    IBS (irritable bowel syndrome)      Liver disease       Steato Hepatitis (fatty liver)    Migraines      Neuropathy       of feet    Osteopenia      Pancreatitis      Pericarditis [id number]    Pneumonia oct 1991    PONV (postoperative nausea and vomiting)      Stomach ulcer      TIA (transient ischemic attack) 2008         Past Surgical History         Past Surgical History:   Procedure Laterality Date    ABDOMEN SURGERY   march 2010     polyp removal    ABDOMEN SURGERY         exploratory for scar tissue    APPENDECTOMY        BREAST SURGERY   oct 26 2010, nov 11 2010     lumpectomy left    BREAST SURGERY Left 10/14/2015     excision palpable left breast lesion     CARDIAC SURGERY         heart cath many yrs ago states was normal    CATARACT REMOVAL WITH IMPLANT Left 2/26/13     /with PC IOL    CATARACT REMOVAL WITH IMPLANT Right 3 5 13    CHOLECYSTECTOMY        COLON SURGERY   oct 2013    COLONOSCOPY         cauterized avm    CYSTOSCOPY   11/04/2016     retrograde pylogram/Dr. Reyna Glass    CYSTOSCOPY W BIOPSY OF BLADDER N/A 2/25/2019     CYSTOSCOPY, RETROGRADE, PYELOGRAM  AND CYSTOGRAM performed by Sayra Travis MD at [address] [place] [address] ECHO COMPL [id number]   1/4/2012          ECHO COMPL W DOP COLOR FLOW   3/11/2013          ENDOSCOPY, COLON, DIAGNOSTIC        EYE SURGERY         cataract both eyes    HERNIA REPAIR        HYSTERECTOMY   1985 & 1986    MECKEL DIVERTICULUM EXCISION        NERVE BLOCK Left 05/09/2017     left ileoinjuinal nerve block #1    NERVE BLOCK Left 05/23/2017     left illioinguinal nerve block #2    OTHER SURGICAL HISTORY         birthmark face and arm    OVARY SURGERY        RECTOCELE REPAIR        SKIN BIOPSY         mouth & under left breast    UPPER GASTROINTESTINAL ENDOSCOPY        VAGINA SURGERY               Current Medication      Current Outpatient Medications:     aspirin 81 MG tablet, Take 81 mg by mouth daily , Disp: , Rfl:     estradiol (ESTRACE VAGINAL) 0.1 MG/GM vaginal cream, Apply a small amount to the urethra every other day Use the tip of your finger to apply, Disp: 1 Tube, Rfl: 3    methenamine (HIPREX) 1 g tablet, Take 1 tablet by mouth every other day, Disp: 15 tablet, Rfl: 3    vitamin B-12 (CYANOCOBALAMIN) 1000 MCG tablet, Take 1,000 mcg by mouth daily, Disp: , Rfl:     buPROPion (WELLBUTRIN XL) 150 MG extended release tablet, Take 150 mg by mouth every morning, Disp: , Rfl:     Dulaglutide (TRULICITY SC), Inject 6.06 mg into the skin every 7 days , Disp: , Rfl:     loxapine (LOXITANE) 5 MG capsule, Take 10 mg by mouth nightly , Disp: , Rfl:     B Complex-Biotin-FA (B-50 COMPLEX PO), Take by mouth 2 times daily, Disp: , Rfl:     Saw [place] 450 MG CAPS, Take 450 mg by mouth daily, Disp: , Rfl:     Lactobacillus (PROBIOTIC ACIDOPHILUS PO), Take by mouth daily, Disp: , Rfl:    S1 and positive S2 with regular rate and rhythm. Lungs: Clear to auscultation bilaterally without rales, rhonchi or wheezes. Abdomen: soft, nontender. Positive bowel sounds. No organomegaly. No guarding or rigidity.         Skin:  Upon inspection: the skin appears warm, dry and intact. There is not a previous scar over the affected area. There is not any cellulitis, lymphedema or cutaneous lesions noted in the lower extremities. Upon palpation there is no induration noted.       Neurologic:  Gait: normal;  Motor exam of the upper extremities show: The reflexes in biceps/triceps/brachioradialis are equal and symmetric. Sensory exam C5-T1 are normal bilaterally, except   .         Cardiovascular: The vascular exam is normal and is well perfused to distal extremities. There are 2+ radial pulses bilaterally, and motor and sensation is intact to median, ulnar, and radial, musclocutaneus, and axillary nerve distribution and grossly symmetric bilaterally. There is cap refill noted less than two seconds in all digits. There is not edema of the bilateral upper extremities. There is not varicosities noted in the distal extremities.       Lymph:  Upon palpation,  there is no lymphadenopathy noted in bilateral upper extremities.       Musculoskeletal:  Gait: normal; examination of the nails and digits reveal no cyanosis or clubbing.     Cervical Exam:  On physical exam, Gayle Syed is well-developed, well-nourished, oriented to person, place and time. her gait is normal.  On evaluation of her cervical spine, she has full range of motion of the cervical spine without pain. There is no cervical tenderness to palpation.      Shoulder Exam:   On evaluation of her bilaterally upper extremities, her right shoulder has no deformity. There is tenderness upon palpation of the anterior shoulder. There is not evidence of scapular dyskinesis. There is not muscle atrophy in shoulder girdle.  The range of motion for the Right Shoulder is 150/45/t10 and for the Left shoulder is 150/45/t10. Right shoulder Motor strength is 5/5 in the supraspinatus, 5/5 internal rotation and 5/5 in external rotation, and Left shoulder motor strength 5/5 in supraspinatus, 5/5 in internal rotation, 5/5 in external rotation.         Right shoulder:  positive Impingement , positive Holliday ,positive  Speeds,negative  Apprehension ,negative Owen Load Shift, negative Jeffery manuver, negative Cross arm test.      Left shoulder:  negative Impingement , negative Holliday ,negative  Speeds,negative  Apprehension ,negative Owen Load Shift, negative Jeffery manuver, negative Cross arm test.      Hand exam:  The skin overlying the hand is  intact. There is not evidence of scar, lesion, laceration, or abrasion. The motion in the small joints of the hand are intact with no stiffness or deformity. The ROM in the MCP flexion diminished/ extension diminished , PIP flexion NWL/ extension WNL, DIP flexion WNL/ extension WNL. There is not rotational deformity. There is no masses or adenopathy in bilateral upper extremities. Radial pulses are 2+ and symmetric bilaterally. Capillary refill is intact and < 2 seconds. Motor strength is 5/5 with flexion and extension of the small finger joints. Patient has a right trigger thumb. Right:  Phallens sign positive, Tinnells sign negative, Median nerve compression test negative ,  Finklesteins negative, CMC Grind test negative, Piano Key Test negative.      Left:  Phallens sign negative, Tinnells sign negative, Median nerve compression test negative ,  Finklesteins negative, CMC Grind test negative, Piano Key Test negative.     X-ray:  None today     MRI:  None today to review     Radiographic findings reviewed with patient        Impression:            Encounter Diagnoses   Name Primary?     Traumatic complete tear of right rotator cuff, initial encounter Yes    Glenohumeral arthritis      Trigger finger of right

## 2019-07-31 ENCOUNTER — OFFICE VISIT (OUTPATIENT)
Dept: ORTHOPEDIC SURGERY | Age: 64
End: 2019-07-31

## 2019-07-31 VITALS — WEIGHT: 148 LBS | HEIGHT: 66 IN | BODY MASS INDEX: 23.78 KG/M2 | TEMPERATURE: 98 F

## 2019-07-31 DIAGNOSIS — M65.311 TRIGGER FINGER OF RIGHT THUMB: Primary | ICD-10-CM

## 2019-07-31 PROCEDURE — 99024 POSTOP FOLLOW-UP VISIT: CPT | Performed by: NURSE PRACTITIONER

## 2019-08-13 ENCOUNTER — OFFICE VISIT (OUTPATIENT)
Dept: ORTHOPEDIC SURGERY | Age: 64
End: 2019-08-13
Payer: MEDICARE

## 2019-08-13 VITALS — TEMPERATURE: 98 F | HEIGHT: 66 IN | BODY MASS INDEX: 23.46 KG/M2 | WEIGHT: 146 LBS

## 2019-08-13 DIAGNOSIS — M75.111 NONTRAUMATIC INCOMPLETE TEAR OF RIGHT ROTATOR CUFF: ICD-10-CM

## 2019-08-13 DIAGNOSIS — M75.41 SHOULDER IMPINGEMENT, RIGHT: Primary | ICD-10-CM

## 2019-08-13 PROCEDURE — 99214 OFFICE O/P EST MOD 30 MIN: CPT | Performed by: ORTHOPAEDIC SURGERY

## 2019-08-13 NOTE — PROGRESS NOTES
Chief Complaint   Patient presents with    Shoulder Pain     Right Shoulder F/U, had Cortisone injection on 6/24/19 with a month of relief. Cindy Man returns today for follow up of her right shoulder pain. She had an injection in March, lasted about one month. She would like to discuss surgical intervention vs injection.     Past Medical History:   Diagnosis Date    Arthritis     spine    Asthma     Bipolar depression ([address]     Bronchitis     Cancer ([address]     breast left    Cerebral artery occlusion with cerebral infarction ([address]     tia 11 yrs ago    Colitis     Diabetes mellitus ([address]     Diverticulitis     Fibromyalgia     GERD (gastroesophageal reflux disease)     Herpes     herpes 2 genitalia    Hiatal hernia     History of blood transfusion     History of cardiovascular stress test 2/15/14    lexiscan    Hyperlipidemia     Hypertension     Hypertensive cardiovascular disease 2/4/2013    IBS (irritable bowel syndrome)     Liver disease     Steato Hepatitis (fatty liver)    Migraines     Neuropathy     of feet    Osteopenia     Pancreatitis     Pericarditis [id number]    Pneumonia oct 1991    PONV (postoperative nausea and vomiting)     Stomach ulcer     TIA (transient ischemic attack) 2008     Past Surgical History:   Procedure Laterality Date    ABDOMEN SURGERY  march 2010    polyp removal    ABDOMEN SURGERY      exploratory for scar tissue    APPENDECTOMY      BREAST SURGERY  oct 26 2010, nov 11 2010    lumpectomy left    BREAST SURGERY Left 10/14/2015    excision palpable left breast lesion     CARDIAC SURGERY      heart cath many yrs ago states was normal    CATARACT REMOVAL WITH IMPLANT Left 2/26/13    /with PC IOL    CATARACT REMOVAL WITH IMPLANT Right 3 5 13    CHOLECYSTECTOMY      COLON SURGERY  oct 2013    COLONOSCOPY      cauterized avm    CYSTOSCOPY  11/04/2016    retrograde pylogram/Dr. Caitlyn Castellnaos    CYSTOSCOPY W BIOPSY OF BLADDER N/A 2/25/2019 CYSTOSCOPY, RETROGRADE, PYELOGRAM  AND CYSTOGRAM performed by Michael Paz MD at [address]  1/4/2012         ECHO COMPL W DOP COLOR FLOW  3/11/2013         ENDOSCOPY, COLON, DIAGNOSTIC      EYE SURGERY      cataract both eyes    FINGER TRIGGER RELEASE Right 07/16/2019    thumb    FINGER TRIGGER RELEASE Right 7/16/2019    TRIGGER FINGER RELEASE RIGHT THUMB performed by Marco Snow DO at [address]      NERVE BLOCK Left 05/09/2017    left ileoinjuinal nerve block #1    NERVE BLOCK Left 05/23/2017    left illioinguinal nerve block #2    OTHER SURGICAL HISTORY      birthmark face and arm    OVARY SURGERY      RECTOCELE REPAIR      SKIN BIOPSY      mouth & under left breast    UPPER GASTROINTESTINAL ENDOSCOPY      VAGINA SURGERY         Current Outpatient Medications:     aspirin 81 MG tablet, Take 81 mg by mouth daily , Disp: , Rfl:     estradiol (ESTRACE VAGINAL) 0.1 MG/GM vaginal cream, Apply a small amount to the urethra every other day Use the tip of your finger to apply, Disp: 1 Tube, Rfl: 3    methenamine (HIPREX) 1 g tablet, Take 1 tablet by mouth every other day (Patient taking differently: Take 1 g by mouth every other day Takes only on Tuesday and Thursday), Disp: 15 tablet, Rfl: 3    vitamin B-12 (CYANOCOBALAMIN) 1000 MCG tablet, Take 1,000 mcg by mouth daily, Disp: , Rfl:     buPROPion (WELLBUTRIN XL) 150 MG extended release tablet, Take 150 mg by mouth every morning, Disp: , Rfl:     Dulaglutide (TRULICITY SC), Inject 8.30 mg into the skin every 7 days , Disp: , Rfl:     loxapine (LOXITANE) 5 MG capsule, Take 20 mg by mouth nightly , Disp: , Rfl:     B Complex-Biotin-FA (B-50 COMPLEX PO), Take by mouth 2 times daily, Disp: , Rfl:     Saw [place] 450 MG CAPS, Take 450 mg by mouth daily, Disp: , Rfl:     Lactobacillus (PROBIOTIC ACIDOPHILUS PO), Take by mouth

## 2019-08-26 ENCOUNTER — ANESTHESIA EVENT (OUTPATIENT)
Dept: OPERATING ROOM | Age: 64
End: 2019-08-26
Payer: MEDICARE

## 2019-08-27 ENCOUNTER — HOSPITAL ENCOUNTER (OUTPATIENT)
Age: 64
Discharge: HOME | End: 2019-08-29
Payer: MEDICARE

## 2019-08-27 DIAGNOSIS — M75.101 TEAR OF RIGHT ROTATOR CUFF, UNSPECIFIED TEAR EXTENT, UNSPECIFIED WHETHER TRAUMATIC: ICD-10-CM

## 2019-08-27 LAB
ANION GAP SERPL CALCULATED.3IONS-SCNC: 24 MMOL/L (ref 7–16)
BUN BLDV-MCNC: 9 MG/DL (ref 8–23)
CALCIUM SERPL-MCNC: 9.9 MG/DL (ref 8.6–10.2)
CHLORIDE BLD-SCNC: 101 MMOL/L (ref 98–107)
CO2: 19 MMOL/L (ref 22–29)
CREAT SERPL-MCNC: 0.7 MG/DL (ref 0.5–1)
GFR AFRICAN AMERICAN: >60
GFR NON-AFRICAN AMERICAN: >60 ML/MIN/1.73
GLUCOSE BLD-MCNC: 143 MG/DL (ref 74–99)
HCT VFR BLD CALC: 39.7 % (ref 34–48)
HEMOGLOBIN: 12.5 G/DL (ref 11.5–15.5)
MCH RBC QN AUTO: 29.5 PG (ref 26–35)
MCHC RBC AUTO-ENTMCNC: 31.5 % (ref 32–34.5)
MCV RBC AUTO: 93.6 FL (ref 80–99.9)
PDW BLD-RTO: 13.5 FL (ref 11.5–15)
PLATELET # BLD: 288 E9/L (ref 130–450)
PMV BLD AUTO: 10.2 FL (ref 7–12)
POTASSIUM SERPL-SCNC: 4.3 MMOL/L (ref 3.5–5)
RBC # BLD: 4.24 E12/L (ref 3.5–5.5)
SODIUM BLD-SCNC: 144 MMOL/L (ref 132–146)
WBC # BLD: 6.1 E9/L (ref 4.5–11.5)

## 2019-08-27 PROCEDURE — 85027 COMPLETE CBC AUTOMATED: CPT

## 2019-08-27 PROCEDURE — 80048 BASIC METABOLIC PNL TOTAL CA: CPT

## 2019-08-27 PROCEDURE — 36415 COLL VENOUS BLD VENIPUNCTURE: CPT

## 2019-08-29 ASSESSMENT — LIFESTYLE VARIABLES: SMOKING_STATUS: 0

## 2019-08-29 NOTE — ANESTHESIA PRE PROCEDURE
acid (VITAMIN C) 500 MG tablet Take 500 mg by mouth 2 times daily.  Lutein 40 MG CAPS Take 1 capsule by mouth daily       Lycopene 10 MG CAPS Take 1 capsule by mouth daily.  Potassium 99 MG TABS Take 1 capsule by mouth daily.  metformin (GLUMETZA) 500 MG (MOD) ER tablet Take 1,000 mg by mouth 2 times daily (with meals).  CALCIUM-VITAMIN D PO Take 1 tablet by mouth daily 1200 mg/ 25mg      losartan (COZAAR) 25 MG tablet Take 25 mg by mouth daily.  Multiple Vitamin (MULTIVITAMIN PO) Take 1 tablet by mouth once.  fish oil-omega-3 fatty acids 1000 MG capsule Take 1 g by mouth 3 times daily Stopped 1 week pre op      Sertraline HCl (ZOLOFT PO) Take 200 mg by mouth nightly          Allergies: Allergies   Allergen Reactions    Codeine Swelling     Pure codeine gets itching    Macrobid [Nitrofurantoin Monohydrate Macrocrystals] Rash    Neosporin [Neomycin-Polymyx-Gramicid] Dermatitis    Pcn [Penicillins] Hives     On stomach and thighs      Ultram [Tramadol Hcl] Itching     & hallucinations      Sulfa Antibiotics      Skin turns red         Problem List:    Patient Active Problem List   Diagnosis Code    Bronchitis with influenza J11.1    Diabetes mellitus ([facility] E11.9    GERD (gastroesophageal reflux disease) K21.9    Hypertensive cardiovascular disease I11.9    Hyperlipidemia with target LDL less than 100 E78.5    Cough due to ACE inhibitor R05, T46.4X5A    Bipolar 1 disorder, depressed, full remission ([facility] F31.76    Cataract, left eye H26.9    Right cataract H26.9    TIA (transient ischemic attack) E68.8    Complicated migraine O17. 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    Coags:   Lab Results   Component Value Date    PROTIME 13.5 10/29/2013    PROTIME 14.3 03/08/2011    INR 1.4 10/29/2013    APTT 27.1 10/29/2013       HCG (If Applicable): No results found for: PREGTESTUR, PREGSERUM, HCG, HCGQUANT     ABGs: No results found for: PHART, PO2ART, YTR9MKX, YZQ2TSD, BEART, O5ZBKZDK     Type & Screen (If Applicable):  No results found for: Marquise Burkett [address]    Anesthesia Evaluation     Anesthesia Plan        Jagjit Burgos MD   8/29/2019 9:49 AM  PAT Preop Chart Review.

## 2019-08-30 ENCOUNTER — ANESTHESIA (OUTPATIENT)
Dept: OPERATING ROOM | Age: 64
End: 2019-08-30
Payer: MEDICARE

## 2019-08-30 ENCOUNTER — HOSPITAL ENCOUNTER (OUTPATIENT)
Age: 64
Setting detail: OUTPATIENT SURGERY
Discharge: HOME | End: 2019-08-30
Attending: ORTHOPAEDIC SURGERY | Admitting: ORTHOPAEDIC SURGERY
Payer: MEDICARE

## 2019-08-30 VITALS — TEMPERATURE: 96.8 F | SYSTOLIC BLOOD PRESSURE: 154 MMHG | DIASTOLIC BLOOD PRESSURE: 106 MMHG | OXYGEN SATURATION: 100 %

## 2019-08-30 VITALS
BODY MASS INDEX: 23.3 KG/M2 | OXYGEN SATURATION: 94 % | HEART RATE: 84 BPM | TEMPERATURE: 97 F | SYSTOLIC BLOOD PRESSURE: 146 MMHG | RESPIRATION RATE: 16 BRPM | HEIGHT: 66 IN | WEIGHT: 145 LBS | DIASTOLIC BLOOD PRESSURE: 72 MMHG

## 2019-08-30 DIAGNOSIS — M75.101 TEAR OF RIGHT ROTATOR CUFF, UNSPECIFIED TEAR EXTENT, UNSPECIFIED WHETHER TRAUMATIC: Primary | ICD-10-CM

## 2019-08-30 DIAGNOSIS — M75.41 IMPINGEMENT SYNDROME OF RIGHT SHOULDER: ICD-10-CM

## 2019-08-30 LAB
CHP ED QC CHECK: NORMAL
GLUCOSE BLD-MCNC: 110 MG/DL
METER GLUCOSE: 110 MG/DL (ref 74–99)

## 2019-08-30 PROCEDURE — 6370000000 HC RX 637 (ALT 250 FOR IP): Performed by: ANESTHESIOLOGY

## 2019-08-30 PROCEDURE — 2720000010 HC SURG SUPPLY STERILE: Performed by: ORTHOPAEDIC SURGERY

## 2019-08-30 PROCEDURE — 2709999900 HC NON-CHARGEABLE SUPPLY: Performed by: ORTHOPAEDIC SURGERY

## 2019-08-30 PROCEDURE — 6360000002 HC RX W HCPCS: Performed by: NURSE ANESTHETIST, CERTIFIED REGISTERED

## 2019-08-30 PROCEDURE — 7100000010 HC PHASE II RECOVERY - FIRST 15 MIN: Performed by: ORTHOPAEDIC SURGERY

## 2019-08-30 PROCEDURE — 29826 SHO ARTHRS SRG DECOMPRESSION: CPT | Performed by: ORTHOPAEDIC SURGERY

## 2019-08-30 PROCEDURE — 2500000003 HC RX 250 WO HCPCS: Performed by: NURSE PRACTITIONER

## 2019-08-30 PROCEDURE — 2580000003 HC RX 258: Performed by: ANESTHESIOLOGY

## 2019-08-30 PROCEDURE — 2580000003 HC RX 258: Performed by: ORTHOPAEDIC SURGERY

## 2019-08-30 PROCEDURE — 3600000014 HC SURGERY LEVEL 4 ADDTL 15MIN: Performed by: ORTHOPAEDIC SURGERY

## 2019-08-30 PROCEDURE — 82962 GLUCOSE BLOOD TEST: CPT

## 2019-08-30 PROCEDURE — 82962 GLUCOSE BLOOD TEST: CPT | Performed by: ORTHOPAEDIC SURGERY

## 2019-08-30 PROCEDURE — 2500000003 HC RX 250 WO HCPCS: Performed by: NURSE ANESTHETIST, CERTIFIED REGISTERED

## 2019-08-30 PROCEDURE — 3700000000 HC ANESTHESIA ATTENDED CARE: Performed by: ORTHOPAEDIC SURGERY

## 2019-08-30 PROCEDURE — 3600000004 HC SURGERY LEVEL 4 BASE: Performed by: ORTHOPAEDIC SURGERY

## 2019-08-30 PROCEDURE — 29823 SHO ARTHRS SRG XTNSV DBRDMT: CPT | Performed by: ORTHOPAEDIC SURGERY

## 2019-08-30 PROCEDURE — 2500000003 HC RX 250 WO HCPCS: Performed by: ORTHOPAEDIC SURGERY

## 2019-08-30 PROCEDURE — 7100000011 HC PHASE II RECOVERY - ADDTL 15 MIN: Performed by: ORTHOPAEDIC SURGERY

## 2019-08-30 PROCEDURE — 6360000002 HC RX W HCPCS: Performed by: ORTHOPAEDIC SURGERY

## 2019-08-30 PROCEDURE — 7100000000 HC PACU RECOVERY - FIRST 15 MIN: Performed by: ORTHOPAEDIC SURGERY

## 2019-08-30 PROCEDURE — 3700000001 HC ADD 15 MINUTES (ANESTHESIA): Performed by: ORTHOPAEDIC SURGERY

## 2019-08-30 PROCEDURE — 7100000001 HC PACU RECOVERY - ADDTL 15 MIN: Performed by: ORTHOPAEDIC SURGERY

## 2019-08-30 RX ORDER — OXYCODONE AND ACETAMINOPHEN 10; 325 MG/1; MG/1
1 TABLET ORAL EVERY 6 HOURS PRN
Qty: 28 TABLET | Refills: 0 | Status: SHIPPED | OUTPATIENT
Start: 2019-08-30 | End: 2019-09-05 | Stop reason: SDUPTHER

## 2019-08-30 RX ORDER — PROPOFOL 10 MG/ML
INJECTION, EMULSION INTRAVENOUS PRN
Status: DISCONTINUED | OUTPATIENT
Start: 2019-08-30 | End: 2019-08-30 | Stop reason: SDUPTHER

## 2019-08-30 RX ORDER — MIDAZOLAM HYDROCHLORIDE 1 MG/ML
INJECTION INTRAMUSCULAR; INTRAVENOUS PRN
Status: DISCONTINUED | OUTPATIENT
Start: 2019-08-30 | End: 2019-08-30 | Stop reason: SDUPTHER

## 2019-08-30 RX ORDER — FENTANYL CITRATE 50 UG/ML
INJECTION, SOLUTION INTRAMUSCULAR; INTRAVENOUS PRN
Status: DISCONTINUED | OUTPATIENT
Start: 2019-08-30 | End: 2019-08-30 | Stop reason: SDUPTHER

## 2019-08-30 RX ORDER — HYDROMORPHONE HYDROCHLORIDE 1 MG/ML
0.25 INJECTION, SOLUTION INTRAMUSCULAR; INTRAVENOUS; SUBCUTANEOUS EVERY 5 MIN PRN
Status: DISCONTINUED | OUTPATIENT
Start: 2019-08-30 | End: 2019-08-30 | Stop reason: HOSPADM

## 2019-08-30 RX ORDER — NEOSTIGMINE METHYLSULFATE 1 MG/ML
INJECTION, SOLUTION INTRAVENOUS PRN
Status: DISCONTINUED | OUTPATIENT
Start: 2019-08-30 | End: 2019-08-30 | Stop reason: SDUPTHER

## 2019-08-30 RX ORDER — CLINDAMYCIN PHOSPHATE 900 MG/50ML
900 INJECTION INTRAVENOUS ONCE
Status: COMPLETED | OUTPATIENT
Start: 2019-08-30 | End: 2019-08-30

## 2019-08-30 RX ORDER — MORPHINE SULFATE 2 MG/ML
1 INJECTION, SOLUTION INTRAMUSCULAR; INTRAVENOUS EVERY 5 MIN PRN
Status: DISCONTINUED | OUTPATIENT
Start: 2019-08-30 | End: 2019-08-30 | Stop reason: HOSPADM

## 2019-08-30 RX ORDER — ROCURONIUM BROMIDE 10 MG/ML
INJECTION, SOLUTION INTRAVENOUS PRN
Status: DISCONTINUED | OUTPATIENT
Start: 2019-08-30 | End: 2019-08-30 | Stop reason: SDUPTHER

## 2019-08-30 RX ORDER — OXYCODONE HYDROCHLORIDE AND ACETAMINOPHEN 5; 325 MG/1; MG/1
2 TABLET ORAL EVERY 4 HOURS PRN
Status: DISCONTINUED | OUTPATIENT
Start: 2019-08-30 | End: 2019-08-30 | Stop reason: HOSPADM

## 2019-08-30 RX ORDER — HYDRALAZINE HYDROCHLORIDE 20 MG/ML
5 INJECTION INTRAMUSCULAR; INTRAVENOUS EVERY 10 MIN PRN
Status: DISCONTINUED | OUTPATIENT
Start: 2019-08-30 | End: 2019-08-30 | Stop reason: HOSPADM

## 2019-08-30 RX ORDER — PROMETHAZINE HYDROCHLORIDE 25 MG/ML
25 INJECTION, SOLUTION INTRAMUSCULAR; INTRAVENOUS
Status: DISCONTINUED | OUTPATIENT
Start: 2019-08-30 | End: 2019-08-30 | Stop reason: HOSPADM

## 2019-08-30 RX ORDER — SODIUM CHLORIDE, SODIUM LACTATE, POTASSIUM CHLORIDE, CALCIUM CHLORIDE 600; 310; 30; 20 MG/100ML; MG/100ML; MG/100ML; MG/100ML
INJECTION, SOLUTION INTRAVENOUS CONTINUOUS
Status: DISCONTINUED | OUTPATIENT
Start: 2019-08-30 | End: 2019-08-30 | Stop reason: HOSPADM

## 2019-08-30 RX ORDER — KETAMINE HYDROCHLORIDE 50 MG/ML
INJECTION, SOLUTION, CONCENTRATE INTRAMUSCULAR; INTRAVENOUS PRN
Status: DISCONTINUED | OUTPATIENT
Start: 2019-08-30 | End: 2019-08-30 | Stop reason: SDUPTHER

## 2019-08-30 RX ORDER — LABETALOL 20 MG/4 ML (5 MG/ML) INTRAVENOUS SYRINGE
5 EVERY 10 MIN PRN
Status: DISCONTINUED | OUTPATIENT
Start: 2019-08-30 | End: 2019-08-30 | Stop reason: HOSPADM

## 2019-08-30 RX ORDER — DIPHENHYDRAMINE HYDROCHLORIDE 50 MG/ML
12.5 INJECTION INTRAMUSCULAR; INTRAVENOUS
Status: DISCONTINUED | OUTPATIENT
Start: 2019-08-30 | End: 2019-08-30 | Stop reason: HOSPADM

## 2019-08-30 RX ORDER — BUPIVACAINE HYDROCHLORIDE AND EPINEPHRINE 2.5; 5 MG/ML; UG/ML
INJECTION, SOLUTION EPIDURAL; INFILTRATION; INTRACAUDAL; PERINEURAL PRN
Status: DISCONTINUED | OUTPATIENT
Start: 2019-08-30 | End: 2019-08-30 | Stop reason: ALTCHOICE

## 2019-08-30 RX ORDER — HYDROCODONE BITARTRATE AND ACETAMINOPHEN 5; 325 MG/1; MG/1
2 TABLET ORAL PRN
Status: DISCONTINUED | OUTPATIENT
Start: 2019-08-30 | End: 2019-08-30 | Stop reason: HOSPADM

## 2019-08-30 RX ORDER — DEXAMETHASONE SODIUM PHOSPHATE 10 MG/ML
INJECTION, SOLUTION INTRAMUSCULAR; INTRAVENOUS PRN
Status: DISCONTINUED | OUTPATIENT
Start: 2019-08-30 | End: 2019-08-30 | Stop reason: SDUPTHER

## 2019-08-30 RX ORDER — MEPERIDINE HYDROCHLORIDE 50 MG/ML
12.5 INJECTION INTRAMUSCULAR; INTRAVENOUS; SUBCUTANEOUS EVERY 5 MIN PRN
Status: DISCONTINUED | OUTPATIENT
Start: 2019-08-30 | End: 2019-08-30 | Stop reason: HOSPADM

## 2019-08-30 RX ORDER — GLYCOPYRROLATE 1 MG/5 ML
SYRINGE (ML) INTRAVENOUS PRN
Status: DISCONTINUED | OUTPATIENT
Start: 2019-08-30 | End: 2019-08-30 | Stop reason: SDUPTHER

## 2019-08-30 RX ORDER — FENTANYL CITRATE 50 UG/ML
50 INJECTION, SOLUTION INTRAMUSCULAR; INTRAVENOUS EVERY 5 MIN PRN
Status: DISCONTINUED | OUTPATIENT
Start: 2019-08-30 | End: 2019-08-30 | Stop reason: HOSPADM

## 2019-08-30 RX ORDER — HYDROCODONE BITARTRATE AND ACETAMINOPHEN 5; 325 MG/1; MG/1
1 TABLET ORAL PRN
Status: DISCONTINUED | OUTPATIENT
Start: 2019-08-30 | End: 2019-08-30 | Stop reason: HOSPADM

## 2019-08-30 RX ADMIN — SODIUM CHLORIDE, POTASSIUM CHLORIDE, SODIUM LACTATE AND CALCIUM CHLORIDE: 600; 310; 30; 20 INJECTION, SOLUTION INTRAVENOUS at 11:16

## 2019-08-30 RX ADMIN — OXYCODONE HYDROCHLORIDE AND ACETAMINOPHEN 2 TABLET: 5; 325 TABLET ORAL at 12:39

## 2019-08-30 RX ADMIN — SODIUM CHLORIDE, POTASSIUM CHLORIDE, SODIUM LACTATE AND CALCIUM CHLORIDE: 600; 310; 30; 20 INJECTION, SOLUTION INTRAVENOUS at 09:45

## 2019-08-30 RX ADMIN — Medication 0.6 MG: at 11:48

## 2019-08-30 RX ADMIN — FENTANYL CITRATE 50 MCG: 50 INJECTION, SOLUTION INTRAMUSCULAR; INTRAVENOUS at 10:36

## 2019-08-30 RX ADMIN — FENTANYL CITRATE 100 MCG: 50 INJECTION, SOLUTION INTRAMUSCULAR; INTRAVENOUS at 10:48

## 2019-08-30 RX ADMIN — Medication 30 MG: at 10:36

## 2019-08-30 RX ADMIN — Medication 3 MG: at 11:48

## 2019-08-30 RX ADMIN — FENTANYL CITRATE 50 MCG: 50 INJECTION, SOLUTION INTRAMUSCULAR; INTRAVENOUS at 10:33

## 2019-08-30 RX ADMIN — KETAMINE HYDROCHLORIDE 50 MG: 50 INJECTION INTRAMUSCULAR; INTRAVENOUS at 10:53

## 2019-08-30 RX ADMIN — CLINDAMYCIN PHOSPHATE 900 MG: 18 INJECTION, SOLUTION INTRAVENOUS at 10:29

## 2019-08-30 RX ADMIN — PROPOFOL 50 MG: 10 INJECTION, EMULSION INTRAVENOUS at 11:23

## 2019-08-30 RX ADMIN — MIDAZOLAM 2 MG: 1 INJECTION INTRAMUSCULAR; INTRAVENOUS at 10:30

## 2019-08-30 RX ADMIN — DEXAMETHASONE SODIUM PHOSPHATE 10 MG: 10 INJECTION, SOLUTION INTRAMUSCULAR; INTRAVENOUS at 11:02

## 2019-08-30 RX ADMIN — FENTANYL CITRATE 50 MCG: 50 INJECTION, SOLUTION INTRAMUSCULAR; INTRAVENOUS at 10:53

## 2019-08-30 RX ADMIN — PROPOFOL 150 MG: 10 INJECTION, EMULSION INTRAVENOUS at 10:36

## 2019-08-30 ASSESSMENT — PAIN SCALES - GENERAL
PAINLEVEL_OUTOF10: 0
PAINLEVEL_OUTOF10: 4
PAINLEVEL_OUTOF10: 5
PAINLEVEL_OUTOF10: 0
PAINLEVEL_OUTOF10: 5
PAINLEVEL_OUTOF10: 5

## 2019-08-30 ASSESSMENT — PULMONARY FUNCTION TESTS
PIF_VALUE: 18
PIF_VALUE: 14
PIF_VALUE: 2
PIF_VALUE: 2
PIF_VALUE: 18
PIF_VALUE: 24
PIF_VALUE: 15
PIF_VALUE: 3
PIF_VALUE: 8
PIF_VALUE: 13
PIF_VALUE: 18
PIF_VALUE: 18
PIF_VALUE: 26
PIF_VALUE: 18
PIF_VALUE: 15
PIF_VALUE: 18
PIF_VALUE: 25
PIF_VALUE: 14
PIF_VALUE: 18
PIF_VALUE: 1
PIF_VALUE: 16
PIF_VALUE: 18
PIF_VALUE: 14
PIF_VALUE: 18
PIF_VALUE: 1
PIF_VALUE: 18
PIF_VALUE: 17
PIF_VALUE: 18
PIF_VALUE: 18
PIF_VALUE: 15
PIF_VALUE: 42
PIF_VALUE: 18
PIF_VALUE: 17
PIF_VALUE: 1
PIF_VALUE: 1
PIF_VALUE: 12
PIF_VALUE: 1
PIF_VALUE: 18
PIF_VALUE: 14
PIF_VALUE: 18
PIF_VALUE: 14
PIF_VALUE: 1
PIF_VALUE: 18
PIF_VALUE: 35
PIF_VALUE: 18
PIF_VALUE: 11
PIF_VALUE: 18
PIF_VALUE: 19
PIF_VALUE: 15
PIF_VALUE: 18
PIF_VALUE: 11
PIF_VALUE: 18
PIF_VALUE: 14
PIF_VALUE: 8
PIF_VALUE: 6
PIF_VALUE: 18
PIF_VALUE: 18
PIF_VALUE: 1
PIF_VALUE: 18
PIF_VALUE: 18
PIF_VALUE: 14
PIF_VALUE: 15
PIF_VALUE: 18
PIF_VALUE: 13
PIF_VALUE: 18
PIF_VALUE: 2
PIF_VALUE: 14
PIF_VALUE: 18
PIF_VALUE: 16
PIF_VALUE: 14
PIF_VALUE: 1
PIF_VALUE: 3
PIF_VALUE: 18
PIF_VALUE: 15

## 2019-08-30 ASSESSMENT — PAIN DESCRIPTION - PAIN TYPE
TYPE: SURGICAL PAIN

## 2019-08-30 ASSESSMENT — PAIN - FUNCTIONAL ASSESSMENT: PAIN_FUNCTIONAL_ASSESSMENT: 0-10

## 2019-08-30 ASSESSMENT — PAIN DESCRIPTION - PROGRESSION: CLINICAL_PROGRESSION: NOT CHANGED

## 2019-08-30 NOTE — H&P
Updated H&P    Chief Complaint   Patient presents with    Shoulder Pain       Right Shoulder F/U, had Cortisone injection on 6/24/19 with a month of relief.         Magi Hendricks returns today for follow up of her right shoulder pain. She had an injection in March, lasted about one month.   She would like to discuss surgical intervention vs injection.          Past Medical History:   Diagnosis Date    Arthritis       spine    Asthma      Bipolar depression (HCC)      Bronchitis      Cancer ([address]       breast left    Cerebral artery occlusion with cerebral infarction ([address]       tia 11 yrs ago    Colitis      Diabetes mellitus ([address]      Diverticulitis      Fibromyalgia      GERD (gastroesophageal reflux disease)      Herpes       herpes 2 genitalia    Hiatal hernia      History of blood transfusion      History of cardiovascular stress test 2/15/14     lexiscan    Hyperlipidemia      Hypertension      Hypertensive cardiovascular disease 2/4/2013    IBS (irritable bowel syndrome)      Liver disease       Steato Hepatitis (fatty liver)    Migraines      Neuropathy       of feet    Osteopenia      Pancreatitis      Pericarditis [id number]    Pneumonia oct 1991    PONV (postoperative nausea and vomiting)      Stomach ulcer      TIA (transient ischemic attack) 2008            Past Surgical History:   Procedure Laterality Date    ABDOMEN SURGERY   march 2010     polyp removal    ABDOMEN SURGERY         exploratory for scar tissue    APPENDECTOMY        BREAST SURGERY   oct 26 2010, nov 11 2010     lumpectomy left    BREAST SURGERY Left 10/14/2015     excision palpable left breast lesion     CARDIAC SURGERY         heart cath many yrs ago states was normal    CATARACT REMOVAL WITH IMPLANT Left 2/26/13     /with PC IOL    CATARACT REMOVAL WITH IMPLANT Right 3 5 13    CHOLECYSTECTOMY        COLON SURGERY   oct 2013    COLONOSCOPY         cauterized avm    CYSTOSCOPY   11/04/2016     Relationship status: Not on file    Intimate partner violence:       Fear of current or ex partner: Not on file       Emotionally abused: Not on file       Physically abused: Not on file       Forced sexual activity: Not on file   Other Topics Concern    Not on file   Social History Narrative    Not on file            Family History   Problem Relation Age of Onset    Kidney Disease Mother      Stroke Father           REVIEW OF SYSTEMS:      General/Constitution:  (-)weight loss, (-)fever, (-)chills, (-)weakness. Skin: (-) rash,(-) psoriasis,(-) eczema, (-)skin cancer. Musculoskeletal: (-) fractures,  (-) dislocations,(-) collagen vascular disease, (-) fibromyalgia, (-) multiple sclerosis, (-) muscular dystrophy, (-) RSD,(-) joint pain (-)swelling, (-) joint pain,swelling. Neurologic: (-) epilepsy, (-)seizures,(-) brain tumor,(-) TIA, (-)stroke, (-)headaches, (-)Parkinson disease,(-) memory loss, (-) LOC. Cardiovascular: (-) Chest pain, (-) swelling in legs/feet, (-) SOB, (-) cramping in legs/feet with walking. Respiratory: (-) SOB, (-) Coughing, (-) night sweats. GI: (-) nausea, (-) vomiting, (-) diarrhea, (-) blood in stool, (-) gastric ulcer. Psychiatric: (-) Depression, (-) Anxiety, (-) bipolar disease, (-) Alzheimer's Disease  Allergic/Immunologic: (-) allergies latex, (-) allergies metal, (-) skin sensitivity.   Hematlogic: (-) anemia, (-) blood transfusion, (-) DVT/PE, (-) Clotting disorders     SUBJECTIVE:     Vital signs are stable.  In general, patient is awake, alert and oriented X3, in no apparent distress.  Examination of HENT reveals normocephalic, atraumatic.  PERRLA/EOMI sclera are white.  Conjunctivae are clear.  TM's are intact.  Pharynx is pink and moist.  Uvula and tongue are midline.  Heart: Positive S1 and positive S2 with regular rate and rhythm.  Lungs: Clear to auscultation bilaterally without rales, rhonchi or wheezes.  Abdomen: soft, nontender.  Positive bowel sounds.  No organomegaly.  No guarding or rigidity.        Constitution:  The patient is alert and oriented x 3, appears to be stated age and in no distress. /71   Pulse 82   Resp 18   Ht 5' 6\" (1.676 m)   Wt 145 lb (65.8 kg)   SpO2 96%   BMI 23.40 kg/m²      Skin:  Upon inspection: the skin appears warm, dry and intact. There is not a previous scar over the affected area. There is not any cellulitis, lymphedema or cutaneous lesions noted in the lower extremities. Upon palpation there is no induration noted.       Neurologic:  Gait: normal;  Motor exam of the upper extremities show: The reflexes in biceps/triceps/brachioradialis are equal and symmetric. Sensory exam C5-T1 are normal bilaterally, except   .         Cardiovascular: The vascular exam is normal and is well perfused to distal extremities. There are 2+ radial pulses bilaterally, and motor and sensation is intact to median, ulnar, and radial, musclocutaneus, and axillary nerve distribution and grossly symmetric bilaterally. There is cap refill noted less than two seconds in all digits. There is not edema of the bilateral upper extremities. There is not varicosities noted in the distal extremities.       Lymph:  Upon palpation,  there is no lymphadenopathy noted in bilateral upper extremities.       Musculoskeletal:  Gait: normal; examination of the nails and digits reveal no cyanosis or clubbing.     Cervical Exam:  On physical exam, Maria Fernanda Galarza is well-developed, well-nourished, oriented to person, place and time. her gait is normal.  On evaluation of her cervical spine, she has full range of motion of the cervical spine without pain. There is no cervical tenderness to palpation.      Shoulder Exam:   On evaluation of her bilaterally upper extremities, her right shoulder has no deformity. There is tenderness upon palpation of the anterior shoulder. There is not evidence of scapular dyskinesis. There is not muscle atrophy in shoulder girdle.  The shoulder, DVT/PE, MI and death. The patient understands these risks and wishes to proceed with surgery.  I will perform a Right shoulder arthroscopy, SAD and debridement 8/30/19.

## 2019-08-30 NOTE — ANESTHESIA POSTPROCEDURE EVALUATION
Department of Anesthesiology  Postprocedure Note    Patient: Gustavo Rush  MRN: [id number]  YOB: 1955  Date of evaluation: 8/30/2019  Time:  1:36 PM     Procedure Summary     Date:  08/30/19 Room / Location:  [facility] 01 / [facility]    Anesthesia Start:  1030 Anesthesia Stop:  1203    Procedure:  RIGHT SHOULDER ARTHROSCOPY., BICEP TENOLYSIS  AND DEBRIDEMENT LABRUM AND ROTATOR CUFF (Right Shoulder) Diagnosis:  (RIGHT SHOULDER IMPINGEMENT; INCOMPLETE RTC TEAR)    Surgeon:  Alida Cruz DO Responsible Provider:  Swapna Taylor MD    Anesthesia Type:  Not recorded ASA Status:  Not recorded          Anesthesia Type: No value filed. Kizzy Phase I: Kizzy Score: 10    Kizzy Phase II: Kizzy Score: 9    Last vitals: Reviewed and per EMR flowsheets.        Anesthesia Post Evaluation    Patient location during evaluation: PACU  Patient participation: complete - patient participated  Level of consciousness: awake and alert  Airway patency: patent  Nausea & Vomiting: no nausea and no vomiting  Complications: no  Cardiovascular status: hemodynamically stable  Respiratory status: room air and spontaneous ventilation  Hydration status: stable

## 2019-09-05 ENCOUNTER — TELEPHONE (OUTPATIENT)
Dept: ORTHOPEDIC SURGERY | Age: 64
End: 2019-09-05

## 2019-09-05 DIAGNOSIS — M75.41 IMPINGEMENT SYNDROME OF RIGHT SHOULDER: ICD-10-CM

## 2019-09-05 RX ORDER — OXYCODONE AND ACETAMINOPHEN 10; 325 MG/1; MG/1
1 TABLET ORAL EVERY 6 HOURS PRN
Qty: 28 TABLET | Refills: 0 | Status: SHIPPED | OUTPATIENT
Start: 2019-09-06 | End: 2019-09-19 | Stop reason: SDUPTHER

## 2019-09-12 DIAGNOSIS — M75.41 SHOULDER IMPINGEMENT, RIGHT: Primary | ICD-10-CM

## 2019-09-13 ENCOUNTER — OFFICE VISIT (OUTPATIENT)
Dept: ORTHOPEDIC SURGERY | Age: 64
End: 2019-09-13

## 2019-09-13 VITALS — WEIGHT: 144 LBS | BODY MASS INDEX: 23.14 KG/M2 | TEMPERATURE: 98 F | HEIGHT: 66 IN

## 2019-09-13 DIAGNOSIS — S46.219A BICEPS TENDON TEAR: ICD-10-CM

## 2019-09-13 DIAGNOSIS — M75.41 SHOULDER IMPINGEMENT, RIGHT: Primary | ICD-10-CM

## 2019-09-13 DIAGNOSIS — M75.111 NONTRAUMATIC INCOMPLETE TEAR OF RIGHT ROTATOR CUFF: ICD-10-CM

## 2019-09-13 PROCEDURE — 99024 POSTOP FOLLOW-UP VISIT: CPT | Performed by: NURSE PRACTITIONER

## 2019-09-13 NOTE — PATIENT INSTRUCTIONS
Patient Education        Rotator Cuff: Exercises  Introduction  Here are some examples of exercises for you to try. The exercises may be suggested for a condition or for rehabilitation. Start each exercise slowly. Ease off the exercises if you start to have pain. You will be told when to start these exercises and which ones will work best for you. How to do the exercises  Pendulum swing    1. Hold on to a table or the back of a chair with your good arm. Then bend forward a little and let your sore arm hang straight down. This exercise does not use the arm muscles. Rather, use your legs and your hips to create movement that makes your arm swing freely. 2. Use the movement from your hips and legs to guide the slightly swinging arm back and forth like a pendulum (or elephant trunk). Then guide it in circles that start small (about the size of a dinner plate). Make the circles a bit larger each day, as your pain allows. 3. Do this exercise for 5 minutes, 5 to 7 times each day. 4. As you have less pain, try bending over a little farther to do this exercise. This will increase the amount of movement at your shoulder. Posterior stretching exercise    1. Hold the elbow of your injured arm with your other hand. 2. Use your hand to pull your injured arm gently up and across your body. You will feel a gentle stretch across the back of your injured shoulder. 3. Hold for at least 15 to 30 seconds. Then slowly lower your arm. 4. Repeat 2 to 4 times. Up-the-back stretch    1. Put your hand in your back pocket. Let it rest there to stretch your shoulder. 2. With your other hand, hold your injured arm (palm outward) behind your back by the wrist. Pull your arm up gently to stretch your shoulder. 3. Next, put a towel over your other shoulder. Put the hand of your injured arm behind your back. Now hold the back end of the towel. With the other hand, hold the front end of the towel in front of your body.  Pull gently on the front end of the towel. This will bring your hand farther up your back to stretch your shoulder. Overhead stretch    1. Standing about an arm's length away, grasp onto a solid surface. You could use a countertop, a doorknob, or the back of a sturdy chair. 2. With your knees slightly bent, bend forward with your arms straight. Lower your upper body, and let your shoulders stretch. 3. As your shoulders are able to stretch farther, you may need to take a step or two backward. 4. Hold for at least 15 to 30 seconds. Then stand up and relax. If you had stepped back during your stretch, step forward so you can keep your hands on the solid surface. 5. Repeat 2 to 4 times. Shoulder flexion (lying down)    1. Lie on your back, holding a wand with both hands. Your palms should face down as you hold the wand. 2. Keeping your elbows straight, slowly raise your arms over your head. Raise them until you feel a stretch in your shoulders, upper back, and chest.  3. Hold for 15 to 30 seconds. 4. Repeat 2 to 4 times. Shoulder rotation (lying down)    1. Lie on your back. Hold a wand with both hands with your elbows bent and palms up. 2. Keep your elbows close to your body, and move the wand across your body toward the sore arm. 3. Hold for 8 to 12 seconds. 4. Repeat 2 to 4 times. Wall climbing (to the side)    1. Stand with your side to a wall so that your fingers can just touch it at an angle about 30 degrees toward the front of your body. 2. Walk the fingers of your injured arm up the wall as high as pain permits. Try not to shrug your shoulder up toward your ear as you move your arm up. 3. Hold that position for a count of at least 15 to 20.  4. Walk your fingers back down to the starting position. 5. Repeat at least 2 to 4 times. Try to reach higher each time. Wall climbing (to the front)    1. Face a wall, and stand so your fingers can just touch it.   2. Keeping your shoulder down, walk the your side. 3. Hold one end of the elastic band in the hand of the painful arm. 4. Slowly rotate your forearm toward your body until it touches your belly. Slowly move it back to where you started. 5. Keep your elbow and upper arm firmly tucked against the towel roll or at your side. 6. Repeat 8 to 12 times. External rotator strengthening exercise    1. Start by tying a piece of elastic exercise material to a doorknob. You can use surgical tubing or Thera-Band. (You may also hold one end of the band in each hand.)  2. Stand or sit with your shoulder relaxed and your elbow bent 90 degrees. Your upper arm should rest comfortably against your side. Squeeze a rolled towel between your elbow and your body for comfort. This will help keep your arm at your side. 3. Hold one end of the elastic band with the hand of the painful arm. 4. Start with your forearm across your belly. Slowly rotate the forearm out away from your body. Keep your elbow and upper arm tucked against the towel roll or the side of your body until you begin to feel tightness in your shoulder. Slowly move your arm back to where you started. 5. Repeat 8 to 12 times. Follow-up care is a key part of your treatment and safety. Be sure to make and go to all appointments, and call your doctor if you are having problems. It's also a good idea to know your test results and keep a list of the medicines you take. Where can you learn more? Go to https://[email or web address]. org and sign in to your [employer] account. Enter Seng Sr in the [facility] box to learn more about \"Rotator Cuff: Exercises. \"     If you do not have an account, please click on the \"Sign Up Now\" link. Current as of: September 20, 2018  Content Version: 12.1  © 4478-2289 Healthwise, Incorporated. Care instructions adapted under license by [facility] ([facility]).  If you have questions about a medical condition or this instruction, always ask your healthcare professional.

## 2019-09-19 ENCOUNTER — TELEPHONE (OUTPATIENT)
Dept: ORTHOPEDIC SURGERY | Age: 64
End: 2019-09-19

## 2019-09-19 DIAGNOSIS — M75.41 IMPINGEMENT SYNDROME OF RIGHT SHOULDER: ICD-10-CM

## 2019-09-19 RX ORDER — OXYCODONE AND ACETAMINOPHEN 10; 325 MG/1; MG/1
1 TABLET ORAL EVERY 6 HOURS PRN
Qty: 28 TABLET | Refills: 0 | Status: SHIPPED | OUTPATIENT
Start: 2019-09-19 | End: 2019-10-02 | Stop reason: SDUPTHER

## 2019-09-27 ENCOUNTER — OFFICE VISIT (OUTPATIENT)
Dept: ORTHOPEDIC SURGERY | Age: 64
End: 2019-09-27

## 2019-09-27 VITALS — TEMPERATURE: 98 F | WEIGHT: 144 LBS | HEIGHT: 66 IN | BODY MASS INDEX: 23.14 KG/M2

## 2019-09-27 DIAGNOSIS — M75.41 IMPINGEMENT SYNDROME OF RIGHT SHOULDER: Primary | ICD-10-CM

## 2019-09-27 DIAGNOSIS — M75.111 NONTRAUMATIC INCOMPLETE TEAR OF RIGHT ROTATOR CUFF: ICD-10-CM

## 2019-09-27 DIAGNOSIS — S46.219A BICEPS TENDON TEAR: ICD-10-CM

## 2019-09-27 PROCEDURE — 99024 POSTOP FOLLOW-UP VISIT: CPT | Performed by: NURSE PRACTITIONER

## 2019-10-02 ENCOUNTER — TELEPHONE (OUTPATIENT)
Dept: ORTHOPEDIC SURGERY | Age: 64
End: 2019-10-02

## 2019-10-02 DIAGNOSIS — M75.41 IMPINGEMENT SYNDROME OF RIGHT SHOULDER: ICD-10-CM

## 2019-10-02 RX ORDER — OXYCODONE AND ACETAMINOPHEN 10; 325 MG/1; MG/1
1 TABLET ORAL EVERY 8 HOURS PRN
Qty: 21 TABLET | Refills: 0 | Status: SHIPPED | OUTPATIENT
Start: 2019-10-02 | End: 2019-10-09

## 2019-10-10 ENCOUNTER — TELEPHONE (OUTPATIENT)
Dept: ORTHOPEDIC SURGERY | Age: 64
End: 2019-10-10

## 2019-10-18 ENCOUNTER — OFFICE VISIT (OUTPATIENT)
Dept: ORTHOPEDIC SURGERY | Age: 64
End: 2019-10-18

## 2019-10-18 VITALS — HEIGHT: 66 IN | TEMPERATURE: 98 F | BODY MASS INDEX: 23.14 KG/M2 | WEIGHT: 144 LBS

## 2019-10-18 DIAGNOSIS — M75.111 NONTRAUMATIC INCOMPLETE TEAR OF RIGHT ROTATOR CUFF: ICD-10-CM

## 2019-10-18 DIAGNOSIS — M75.41 IMPINGEMENT SYNDROME OF RIGHT SHOULDER: Primary | ICD-10-CM

## 2019-10-18 DIAGNOSIS — S46.219A BICEPS TENDON TEAR: ICD-10-CM

## 2019-10-18 PROCEDURE — 99024 POSTOP FOLLOW-UP VISIT: CPT | Performed by: NURSE PRACTITIONER

## 2019-11-06 ENCOUNTER — HOSPITAL ENCOUNTER (OUTPATIENT)
Dept: MAMMOGRAPHY | Age: 64
Discharge: HOME | End: 2019-11-08
Payer: MEDICARE

## 2019-11-06 DIAGNOSIS — C50.212 MALIGNANT NEOPLASM OF UPPER-INNER QUADRANT OF LEFT FEMALE BREAST, UNSPECIFIED ESTROGEN RECEPTOR STATUS (HCC): ICD-10-CM

## 2019-11-06 PROCEDURE — 77063 BREAST TOMOSYNTHESIS BI: CPT

## 2019-12-16 ENCOUNTER — OFFICE VISIT (OUTPATIENT)
Dept: ORTHOPEDIC SURGERY | Age: 64
End: 2019-12-16
Payer: MEDICARE

## 2019-12-16 VITALS — BODY MASS INDEX: 23.46 KG/M2 | WEIGHT: 146 LBS | HEIGHT: 66 IN

## 2019-12-16 DIAGNOSIS — M75.41 IMPINGEMENT SYNDROME OF RIGHT SHOULDER: Primary | ICD-10-CM

## 2019-12-16 PROCEDURE — 99214 OFFICE O/P EST MOD 30 MIN: CPT | Performed by: ORTHOPAEDIC SURGERY

## 2019-12-16 PROCEDURE — 20610 DRAIN/INJ JOINT/BURSA W/O US: CPT | Performed by: ORTHOPAEDIC SURGERY

## 2019-12-16 RX ORDER — BLOOD SUGAR DIAGNOSTIC
STRIP MISCELLANEOUS
Refills: 5 | COMMUNITY
Start: 2019-11-04 | End: 2020-11-06

## 2019-12-16 RX ORDER — FAMOTIDINE 40 MG/1
TABLET, FILM COATED ORAL
Refills: 1 | COMMUNITY
Start: 2019-11-03

## 2019-12-16 RX ORDER — TRIAMCINOLONE ACETONIDE 40 MG/ML
40 INJECTION, SUSPENSION INTRA-ARTICULAR; INTRAMUSCULAR ONCE
Status: COMPLETED | OUTPATIENT
Start: 2019-12-16 | End: 2019-12-16

## 2019-12-16 RX ORDER — VALSARTAN 80 MG/1
TABLET ORAL
Refills: 2 | COMMUNITY
Start: 2019-10-18 | End: 2020-08-06

## 2019-12-16 RX ORDER — BUPROPION HYDROCHLORIDE 150 MG/1
TABLET, EXTENDED RELEASE ORAL
Refills: 0 | COMMUNITY
Start: 2019-12-09

## 2019-12-16 RX ORDER — BLOOD-GLUCOSE METER
EACH MISCELLANEOUS
Refills: 0 | COMMUNITY
Start: 2019-11-06 | End: 2020-11-06

## 2019-12-16 RX ORDER — LANCETS
EACH MISCELLANEOUS
Refills: 5 | COMMUNITY
Start: 2019-11-06 | End: 2020-11-06

## 2019-12-16 RX ADMIN — TRIAMCINOLONE ACETONIDE 40 MG: 40 INJECTION, SUSPENSION INTRA-ARTICULAR; INTRAMUSCULAR at 09:47

## 2020-01-27 ENCOUNTER — OFFICE VISIT (OUTPATIENT)
Dept: ORTHOPEDIC SURGERY | Age: 65
End: 2020-01-27
Payer: MEDICARE

## 2020-01-27 VITALS — BODY MASS INDEX: 23.46 KG/M2 | HEIGHT: 66 IN | WEIGHT: 146 LBS

## 2020-01-27 PROCEDURE — 99213 OFFICE O/P EST LOW 20 MIN: CPT | Performed by: ORTHOPAEDIC SURGERY

## 2020-01-27 RX ORDER — OXYCODONE HYDROCHLORIDE AND ACETAMINOPHEN 5; 325 MG/1; MG/1
1 TABLET ORAL EVERY 6 HOURS PRN
Qty: 28 TABLET | Refills: 0 | Status: SHIPPED
Start: 2020-01-27 | End: 2020-02-06 | Stop reason: SDUPTHER

## 2020-01-27 NOTE — PROGRESS NOTES
prophylactic on her own, Disp: , Rfl:     estradiol (ESTRACE VAGINAL) 0.1 MG/GM vaginal cream, Apply a small amount to the urethra every other day Use the tip of your finger to apply, Disp: 1 Tube, Rfl: 3    vitamin B-12 (CYANOCOBALAMIN) 1000 MCG tablet, Take 1,000 mcg by mouth daily, Disp: , Rfl:     buPROPion (WELLBUTRIN XL) 150 MG extended release tablet, Take 150 mg by mouth every morning, Disp: , Rfl:     Dulaglutide (TRULICITY SC), Inject 1.19 mg into the skin every 7 days Q Tuesdays, Disp: , Rfl:     loxapine (LOXITANE) 5 MG capsule, Take 20 mg by mouth nightly , Disp: , Rfl:     B Complex-Biotin-FA (B-50 COMPLEX PO), Take by mouth 2 times daily, Disp: , Rfl:     Saw Edmond 450 MG CAPS, Take 450 mg by mouth daily, Disp: , Rfl:     Lactobacillus (PROBIOTIC ACIDOPHILUS PO), Take by mouth daily, Disp: , Rfl:     Turmeric 500 MG TABS, Take 500 mg by mouth daily, Disp: , Rfl:     ondansetron (ZOFRAN) 4 MG tablet, Take 4 mg by mouth every 8 hours as needed for Nausea or Vomiting, Disp: , Rfl:     propranolol (INDERAL) 20 MG tablet, Take 40 mg by mouth 2 times daily as needed 2 tablets as needed for tremors, Disp: , Rfl:     lidocaine (XYLOCAINE) 5 % ointment, Apply topically as needed. , Disp: 3 Tube, Rfl: 0    sucralfate (CARAFATE) 1 GM tablet, Take 1 g by mouth 2 times daily , Disp: , Rfl:     valACYclovir (VALTREX) 1 G tablet, Take 1,000 mg by mouth 2 times daily As needed, Disp: , Rfl:     b complex vitamins capsule, Take 1 capsule by mouth daily, Disp: , Rfl:     diphenhydrAMINE (BENADRYL) 25 MG capsule, Take 25 mg by mouth as needed 1 OR 2 TABLETS  D/T MIGRAINE, Disp: , Rfl:     montelukast (SINGULAIR) 10 MG tablet, Take 10 mg by mouth nightly., Disp: , Rfl:     simvastatin (ZOCOR) 20 MG tablet, Take 20 mg by mouth every morning (before breakfast) , Disp: , Rfl:     ranitidine (ZANTAC) 300 MG tablet, Take 300 mg by mouth nightly., Disp: , Rfl:     Acetaminophen (TYLENOL ARTHRITIS EXT RELIEF PO), Take 2 capsules by mouth 3 times daily , Disp: , Rfl:     vitamin E 400 UNIT capsule, Take 400 Units by mouth daily. , Disp: , Rfl:     ascorbic acid (VITAMIN C) 500 MG tablet, Take 500 mg by mouth 2 times daily. , Disp: , Rfl:     Lutein 40 MG CAPS, Take 1 capsule by mouth daily , Disp: , Rfl:     Lycopene 10 MG CAPS, Take 1 capsule by mouth daily. , Disp: , Rfl:     Potassium 99 MG TABS, Take 1 capsule by mouth daily. , Disp: , Rfl:     metformin (GLUMETZA) 500 MG (MOD) ER tablet, Take 1,000 mg by mouth 2 times daily (with meals). , Disp: , Rfl:     CALCIUM-VITAMIN D PO, Take 1 tablet by mouth daily 1200 mg/ 25mg, Disp: , Rfl:     losartan (COZAAR) 25 MG tablet, Take 25 mg by mouth daily.   , Disp: , Rfl:     Multiple Vitamin (MULTIVITAMIN PO), Take 1 tablet by mouth once., Disp: , Rfl:     fish oil-omega-3 fatty acids 1000 MG capsule, Take 1 g by mouth 3 times daily Stopped 1 week pre op, Disp: , Rfl:     Sertraline HCl (ZOLOFT PO), Take 200 mg by mouth nightly , Disp: , Rfl:   Allergies   Allergen Reactions    Codeine Swelling     Pure codeine gets itching    Macrobid [Nitrofurantoin Monohydrate Macrocrystals] Rash    Neosporin [Neomycin-Polymyx-Gramicid] Dermatitis    Pcn [Penicillins] Hives     On stomach and thighs      Ultram [Tramadol Hcl] Itching     & hallucinations      Sulfa Antibiotics      Skin turns red       Social History     Socioeconomic History    Marital status:      Spouse name: Not on file    Number of children: Not on file    Years of education: Not on file    Highest education level: Not on file   Occupational History    Not on file   Social Needs    Financial resource strain: Not on file    Food insecurity:     Worry: Not on file     Inability: Not on file    Transportation needs:     Medical: Not on file     Non-medical: Not on file   Tobacco Use    Smoking status: Never Smoker    Smokeless tobacco: Never Used   Substance and Sexual Activity    Alcohol use: No    Drug use: No    Sexual activity: Yes     Partners: Male     Comment: not assessed   Lifestyle    Physical activity:     Days per week: Not on file     Minutes per session: Not on file    Stress: Not on file   Relationships    Social connections:     Talks on phone: Not on file     Gets together: Not on file     Attends Anabaptist service: Not on file     Active member of club or organization: Not on file     Attends meetings of clubs or organizations: Not on file     Relationship status: Not on file    Intimate partner violence:     Fear of current or ex partner: Not on file     Emotionally abused: Not on file     Physically abused: Not on file     Forced sexual activity: Not on file   Other Topics Concern    Not on file   Social History Narrative    Not on file     Family History   Problem Relation Age of Onset    Kidney Disease Mother     Stroke Father        REVIEW OF SYSTEMS:     General/Constitution:  (-)weight loss, (-)fever, (-)chills, (-)weakness. Skin: (-) rash,(-) psoriasis,(-) eczema, (-)skin cancer. Musculoskeletal: (-) fractures,  (-) dislocations,(-) collagen vascular disease, (-) fibromyalgia, (-) multiple sclerosis, (-) muscular dystrophy, (-) RSD,(-) joint pain (-)swelling, (-) joint pain,swelling. Neurologic: (-) epilepsy, (-)seizures,(-) brain tumor,(-) TIA, (-)stroke, (-)headaches, (-)Parkinson disease,(-) memory loss, (-) LOC. Cardiovascular: (-) Chest pain, (-) swelling in legs/feet, (-) SOB, (-) cramping in legs/feet with walking. Respiratory: (-) SOB, (-) Coughing, (-) night sweats. GI: (-) nausea, (-) vomiting, (-) diarrhea, (-) blood in stool, (-) gastric ulcer. Psychiatric: (-) Depression, (-) Anxiety, (-) bipolar disease, (-) Alzheimer's Disease  Allergic/Immunologic: (-) allergies latex, (-) allergies metal, (-) skin sensitivity. Hematlogic: (-) anemia, (-) blood transfusion, (-) DVT/PE, (-) Clotting disorders    SUBJECTIVE:    Constitution:   The supraspinatus, 5/5 internal rotation and 4/5 in external rotation, and Left shoulder motor strength 5/5 in supraspinatus, 5/5 in internal rotation, 5/5 in external rotation. Right shoulder:  positiveImpingement , positive Holliday ,negative  Speeds,negative  Apprehension ,negative Owen Load Shift, negative Jeffery manuver, negative Cross arm test.     Left shoulder:  negative Impingement , negative Holliday ,negative  Speeds,negative  Apprehension ,negative Owen Load Shift, negative Jeffery manuver, negative Cross arm test.       Radiographic findings reviewed with patient        Impression:       Encounter Diagnoses   Name Primary?  Impingement syndrome of right shoulder Yes    Traumatic complete tear of right rotator cuff, initial encounter        Plan:    Check MRI right shoulder: r/o rtc tear. The patient reports some relief with csi for about 9 days.

## 2020-02-03 ENCOUNTER — HOSPITAL ENCOUNTER (OUTPATIENT)
Dept: MRI IMAGING | Age: 65
Discharge: HOME | End: 2020-02-05
Payer: MEDICARE

## 2020-02-03 PROCEDURE — 73221 MRI JOINT UPR EXTREM W/O DYE: CPT

## 2020-02-06 ENCOUNTER — TELEPHONE (OUTPATIENT)
Dept: ORTHOPEDIC SURGERY | Age: 65
End: 2020-02-06

## 2020-02-06 RX ORDER — OXYCODONE HYDROCHLORIDE AND ACETAMINOPHEN 5; 325 MG/1; MG/1
1 TABLET ORAL EVERY 6 HOURS PRN
Qty: 28 TABLET | Refills: 0 | Status: SHIPPED
Start: 2020-02-06 | End: 2020-05-11 | Stop reason: SDUPTHER

## 2020-02-11 ENCOUNTER — OFFICE VISIT (OUTPATIENT)
Dept: ORTHOPEDIC SURGERY | Age: 65
End: 2020-02-11
Payer: MEDICARE

## 2020-02-11 VITALS — WEIGHT: 148 LBS | HEIGHT: 66 IN | TEMPERATURE: 98 F | BODY MASS INDEX: 23.78 KG/M2

## 2020-02-11 PROCEDURE — 99213 OFFICE O/P EST LOW 20 MIN: CPT | Performed by: ORTHOPAEDIC SURGERY

## 2020-02-11 RX ORDER — METFORMIN HYDROCHLORIDE 500 MG/1
TABLET, EXTENDED RELEASE ORAL
Status: ON HOLD | COMMUNITY
Start: 2019-12-23 | End: 2022-11-03 | Stop reason: HOSPADM

## 2020-02-11 NOTE — PROGRESS NOTES
Chief Complaint   Patient presents with    Shoulder Pain     Right Shoulder MRI F/U       Radha Orozco returns today for follow up of her right shoulder. She underwent an MRI recently and is here to discuss the results.     Past Medical History:   Diagnosis Date    Arthritis     spine    Asthma     [facility]     Bronchitis     Cancer ([facility] [address] [facility] 2010    breast left  ( lumpectomt and radiation)    Colitis     Diabetes mellitus ([facility] [address] [facility]     Diverticulitis     Fibromyalgia     GERD (gastroesophageal reflux disease)     Herpes     herpes 2 genitalia    Hiatal hernia     History of blood transfusion     History of cardiovascular stress test 2/15/14    lexiscan    Hyperlipidemia     Hypertension     IBS (irritable bowel syndrome)     Liver disease     Steato Hepatitis (fatty liver)    Migraines     Neuropathy     of feet    Osteopenia     Pancreatitis     Pericarditis [id number]    had several episodes during this time span    Pneumonia oct 1991    PONV (postoperative nausea and vomiting)     with anesthesia    Stomach ulcer     TIA (transient ischemic attack) 2008    no deficits     Past Surgical History:   Procedure Laterality Date    ABDOMEN SURGERY  2010    exploratory for scar tissue    APPENDECTOMY      BREAST SURGERY  oct 26 2010, nov 11 2010    lumpectomy left    BREAST SURGERY Left 10/14/2015    excision palpable left breast lesion     CARDIAC SURGERY      heart cath many yrs ago states was normal    CATARACT REMOVAL WITH IMPLANT Left 2/26/13    /with PC IOL    CATARACT REMOVAL WITH IMPLANT Right 3 5 13    CHOLECYSTECTOMY      COLON SURGERY  10/2013    colon resection for diverticulitis    COLONOSCOPY      cauterized avm    CYSTOSCOPY  11/04/2016    retrograde pylogram/Dr. Singh Mis    CYSTOSCOPY W BIOPSY OF BLADDER N/A 2/25/2019    CYSTOSCOPY, RETROGRADE, PYELOGRAM  AND CYSTOGRAM performed by Jose Barnhart MD at [address]  1/4/2012 daily., Disp: , Rfl:     ascorbic acid (VITAMIN C) 500 MG tablet, Take 500 mg by mouth 2 times daily. , Disp: , Rfl:     Lutein 40 MG CAPS, Take 1 capsule by mouth daily , Disp: , Rfl:     Lycopene 10 MG CAPS, Take 1 capsule by mouth daily. , Disp: , Rfl:     Potassium 99 MG TABS, Take 1 capsule by mouth daily. , Disp: , Rfl:     CALCIUM-VITAMIN D PO, Take 1 tablet by mouth daily 1200 mg/ 25mg, Disp: , Rfl:     losartan (COZAAR) 25 MG tablet, Take 25 mg by mouth daily.   , Disp: , Rfl:     Multiple Vitamin (MULTIVITAMIN PO), Take 1 tablet by mouth once., Disp: , Rfl:     fish oil-omega-3 fatty acids 1000 MG capsule, Take 1 g by mouth 3 times daily Stopped 1 week pre op, Disp: , Rfl:     Sertraline HCl (ZOLOFT PO), Take 200 mg by mouth nightly , Disp: , Rfl:   Allergies   Allergen Reactions    Codeine Swelling     Pure codeine gets itching    Macrobid [Nitrofurantoin Monohydrate Macrocrystals] Rash    Neosporin [Neomycin-Polymyx-Gramicid] Dermatitis    Pcn [Penicillins] Hives     On stomach and thighs      Ultram [Tramadol Hcl] Itching     & hallucinations      Sulfa Antibiotics      Skin turns red       Social History     Socioeconomic History    Marital status:      Spouse name: Not on file    Number of children: Not on file    Years of education: Not on file    Highest education level: Not on file   Occupational History    Not on file   Social Needs    Financial resource strain: Not on file    Food insecurity:     Worry: Not on file     Inability: Not on file    Transportation needs:     Medical: Not on file     Non-medical: Not on file   Tobacco Use    Smoking status: Never Smoker    Smokeless tobacco: Never Used   Substance and Sexual Activity    Alcohol use: No    Drug use: No    Sexual activity: Yes     Partners: Male     Comment: not assessed   Lifestyle    Physical activity:     Days per week: Not on file     Minutes per session: Not on file    Stress: Not on file intact. There is  a previous scar over the affected area. There is not any cellulitis, lymphedema or cutaneous lesions noted in the lower extremities. Upon palpation there is no induration noted. Neurologic:  Gait: normal;  Motor exam of the upper extremities show: The reflexes in biceps/triceps/brachioradialis are equal and symmetric. Sensory exam C5-T1 are normal bilaterally. Cardiovascular: The vascular exam is normal and is well perfused to distal extremities. There are 2+ radial pulses bilaterally, and motor and sensation is intact to median, ulnar, and radial, musclocutaneus, and axillary nerve distribution and grossly symmetric bilaterally. There is cap refill noted less than two seconds in all digits. There is not edema of the bilateral upper extremities. There is not varicosities noted in the distal extremities. Lymph:  Upon palpation,  there is no lymphadenopathy noted in bilateral upper extremities. Musculoskeletal:  Gait: normal; examination of the nails and digits reveal no cyanosis or clubbing. Cervical Exam:  On physical exam, Osei Castro is well-developed, well-nourished, oriented to person, place and time. her gait is normal.  On evaluation of her cervical spine, she has full range of motion of the cervical spine without pain. There is no cervical tenderness to palpation. Shoulder Exam:   On evaluation of her bilaterally upper extremities, her right shoulder has no deformity. There is tenderness upon palpation of the shoulder joint. There is not evidence of scapular dyskinesis. There is not muscle atrophy in shoulder girdle. The range of motion for the Right Shoulder is 150/45/t8 and for the Left shoulder is 180/50/t10. Right shoulder Motor strength is 4/5 in the supraspinatus, 5/5 internal rotation and 4/5 in external rotation, and Left shoulder motor strength 5/5 in supraspinatus, 5/5 in internal rotation, 5/5 in external rotation.         Right

## 2020-02-17 ENCOUNTER — TELEPHONE (OUTPATIENT)
Dept: ORTHOPEDIC SURGERY | Age: 65
End: 2020-02-17

## 2020-02-18 ENCOUNTER — TELEPHONE (OUTPATIENT)
Dept: ORTHOPEDIC SURGERY | Age: 65
End: 2020-02-18

## 2020-04-14 ENCOUNTER — TELEPHONE (OUTPATIENT)
Dept: CARDIOLOGY CLINIC | Age: 65
End: 2020-04-14

## 2020-04-14 NOTE — TELEPHONE ENCOUNTER
Left message for patient to please call regarding 4/30/20 visit to discuss changing to virtual visit due to COVID-19

## 2020-05-11 ENCOUNTER — OFFICE VISIT (OUTPATIENT)
Dept: ORTHOPEDIC SURGERY | Age: 65
End: 2020-05-11
Payer: MEDICARE

## 2020-05-11 VITALS — BODY MASS INDEX: 23.78 KG/M2 | WEIGHT: 148 LBS | HEIGHT: 66 IN

## 2020-05-11 PROCEDURE — 99213 OFFICE O/P EST LOW 20 MIN: CPT | Performed by: ORTHOPAEDIC SURGERY

## 2020-05-11 RX ORDER — OXYCODONE HYDROCHLORIDE AND ACETAMINOPHEN 5; 325 MG/1; MG/1
1 TABLET ORAL EVERY 6 HOURS PRN
Qty: 28 TABLET | Refills: 0 | Status: SHIPPED | OUTPATIENT
Start: 2020-05-11 | End: 2020-05-18

## 2020-05-11 NOTE — PROGRESS NOTES
Chief Complaint   Patient presents with    Shoulder Pain     Right shoulder follow up. she reports increased pain since visit in february. Matilde Root returns today for follow up of her right shoulder. She follows up today with increased pain in the shoulder. She did not have any new injury but a continual progression and increased pain to the shoulder.   Past Medical History:   Diagnosis Date    Arthritis     spine    Asthma     Bipolar depression ([address]     Bronchitis     Cancer ([facility] [address] 2010    breast left  ( lumpectomt and radiation)    Colitis     Diabetes mellitus ([address]     Diverticulitis     Fibromyalgia     GERD (gastroesophageal reflux disease)     Herpes     herpes 2 genitalia    Hiatal hernia     History of blood transfusion     History of cardiovascular stress test 2/15/14    lexiscan    Hyperlipidemia     Hypertension     IBS (irritable bowel syndrome)     Liver disease     Steato Hepatitis (fatty liver)    Migraines     Neuropathy     of feet    Osteopenia     Pancreatitis     Pericarditis [id number]    had several episodes during this time span    Pneumonia oct 1991    PONV (postoperative nausea and vomiting)     with anesthesia    Stomach ulcer     TIA (transient ischemic attack) 2008    no deficits     Past Surgical History:   Procedure Laterality Date    ABDOMEN SURGERY  2010    exploratory for scar tissue    APPENDECTOMY      BREAST SURGERY  oct 26 2010, nov 11 2010    lumpectomy left    BREAST SURGERY Left 10/14/2015    excision palpable left breast lesion     CARDIAC SURGERY      heart cath many yrs ago states was normal    CATARACT REMOVAL WITH IMPLANT Left 2/26/13    /with PC IOL    CATARACT REMOVAL WITH IMPLANT Right 3 5 13    CHOLECYSTECTOMY      COLON SURGERY  10/2013    colon resection for diverticulitis    COLONOSCOPY      cauterized avm    CYSTOSCOPY  11/04/2016    retrograde pylogram/Dr. Esther De La Garza    CYSTOSCOPY W BIOPSY OF BLADDER N/A 2/25/2019 Talks on phone: Not on file     Gets together: Not on file     Attends Yarsanism service: Not on file     Active member of club or organization: Not on file     Attends meetings of clubs or organizations: Not on file     Relationship status: Not on file    Intimate partner violence     Fear of current or ex partner: Not on file     Emotionally abused: Not on file     Physically abused: Not on file     Forced sexual activity: Not on file   Other Topics Concern    Not on file   Social History Narrative    Not on file     Family History   Problem Relation Age of Onset    Kidney Disease Mother     Stroke Father        REVIEW OF SYSTEMS:     General/Constitution:  (-)weight loss, (-)fever, (-)chills, (-)weakness. Skin: (-) rash,(-) psoriasis,(-) eczema, (-)skin cancer. Musculoskeletal: (-) fractures,  (-) dislocations,(-) collagen vascular disease, (-) fibromyalgia, (-) multiple sclerosis, (-) muscular dystrophy, (-) RSD,(-) joint pain (-)swelling, (-) joint pain,swelling. Neurologic: (-) epilepsy, (-)seizures,(-) brain tumor,(-) TIA, (-)stroke, (-)headaches, (-)Parkinson disease,(-) memory loss, (-) LOC. Cardiovascular: (-) Chest pain, (-) swelling in legs/feet, (-) SOB, (-) cramping in legs/feet with walking. Respiratory: (-) SOB, (-) Coughing, (-) night sweats. GI: (-) nausea, (-) vomiting, (-) diarrhea, (-) blood in stool, (-) gastric ulcer. Psychiatric: (-) Depression, (-) Anxiety, (-) bipolar disease, (-) Alzheimer's Disease  Allergic/Immunologic: (-) allergies latex, (-) allergies metal, (-) skin sensitivity. Hematlogic: (-) anemia, (-) blood transfusion, (-) DVT/PE, (-) Clotting disorders    SUBJECTIVE:    Constitution:  The patient is alert and oriented x 3, appears to be stated age and in no distress. Ht 5' 6\" (1.676 m)   Wt 148 lb (67.1 kg)   BMI 23.89 kg/m²       Skin:  Upon inspection: the skin appears warm, dry and intact. There is  a previous scar over the affected area. There is not

## 2020-05-28 ENCOUNTER — TELEPHONE (OUTPATIENT)
Dept: ONCOLOGY | Age: 65
End: 2020-05-28

## 2020-07-06 ENCOUNTER — OFFICE VISIT (OUTPATIENT)
Dept: ORTHOPEDIC SURGERY | Age: 65
End: 2020-07-06
Payer: MEDICARE

## 2020-07-06 VITALS — BODY MASS INDEX: 23.63 KG/M2 | WEIGHT: 147 LBS | HEIGHT: 66 IN

## 2020-07-06 PROCEDURE — 99213 OFFICE O/P EST LOW 20 MIN: CPT | Performed by: ORTHOPAEDIC SURGERY

## 2020-07-06 RX ORDER — OXYCODONE HYDROCHLORIDE AND ACETAMINOPHEN 5; 325 MG/1; MG/1
1 TABLET ORAL EVERY 8 HOURS PRN
COMMUNITY
Start: 2020-06-10

## 2020-07-06 NOTE — PROGRESS NOTES
Chief Complaint   Patient presents with    Shoulder Pain     Right shoulder follow up. States her pain is about the same. Emma Ortiz returns today for follow up of her right shoulder. She follows up today with increased pain in the shoulder. She did not have any new injury but a continual progression and increased pain to the shoulder. She had 9 days relief from CSI.   Past Medical History:   Diagnosis Date    Arthritis     spine    Asthma     Bipolar depression ([address]     Bronchitis     Cancer ([facility] [address] 2010    breast left  ( lumpectomt and radiation)    Colitis     Diabetes mellitus ([address]     Diverticulitis     Fibromyalgia     GERD (gastroesophageal reflux disease)     Herpes     herpes 2 genitalia    Hiatal hernia     History of blood transfusion     History of cardiovascular stress test 2/15/14    lexiscan    Hyperlipidemia     Hypertension     IBS (irritable bowel syndrome)     Liver disease     Steato Hepatitis (fatty liver)    Migraines     Neuropathy     of feet    Osteopenia     Pancreatitis     Pericarditis [id number]    had several episodes during this time span    Pneumonia oct 1991    PONV (postoperative nausea and vomiting)     with anesthesia    Stomach ulcer     TIA (transient ischemic attack) 2008    no deficits     Past Surgical History:   Procedure Laterality Date    ABDOMEN SURGERY  2010    exploratory for scar tissue    APPENDECTOMY      BREAST SURGERY  oct 26 2010, nov 11 2010    lumpectomy left    BREAST SURGERY Left 10/14/2015    excision palpable left breast lesion     CARDIAC SURGERY      heart cath many yrs ago states was normal    CATARACT REMOVAL WITH IMPLANT Left 2/26/13    /with PC IOL    CATARACT REMOVAL WITH IMPLANT Right 3 5 13    CHOLECYSTECTOMY      COLON SURGERY  10/2013    colon resection for diverticulitis    COLONOSCOPY      cauterized avm    CYSTOSCOPY  11/04/2016    retrograde pylogram/Dr. Salvatore Lau    CYSTOSCOPY W BIOPSY OF BLADDER N/A prophylactic on her own, Disp: , Rfl:     estradiol (ESTRACE VAGINAL) 0.1 MG/GM vaginal cream, Apply a small amount to the urethra every other day Use the tip of your finger to apply, Disp: 1 Tube, Rfl: 3    vitamin B-12 (CYANOCOBALAMIN) 1000 MCG tablet, Take 1,000 mcg by mouth daily, Disp: , Rfl:     buPROPion (WELLBUTRIN XL) 150 MG extended release tablet, Take 150 mg by mouth every morning, Disp: , Rfl:     Dulaglutide (TRULICITY SC), Inject 2.85 mg into the skin every 7 days Q Tuesdays, Disp: , Rfl:     loxapine (LOXITANE) 5 MG capsule, Take 20 mg by mouth nightly , Disp: , Rfl:     B Complex-Biotin-FA (B-50 COMPLEX PO), Take by mouth 2 times daily, Disp: , Rfl:     Saw [place] 450 MG CAPS, Take 450 mg by mouth daily, Disp: , Rfl:     Lactobacillus (PROBIOTIC ACIDOPHILUS PO), Take by mouth daily, Disp: , Rfl:     Turmeric 500 MG TABS, Take 500 mg by mouth daily, Disp: , Rfl:     ondansetron (ZOFRAN) 4 MG tablet, Take 4 mg by mouth every 8 hours as needed for Nausea or Vomiting, Disp: , Rfl:     propranolol (INDERAL) 20 MG tablet, Take 40 mg by mouth 2 times daily as needed 2 tablets as needed for tremors, Disp: , Rfl:     lidocaine (XYLOCAINE) 5 % ointment, Apply topically as needed. , Disp: 3 Tube, Rfl: 0    sucralfate (CARAFATE) 1 GM tablet, Take 1 g by mouth 2 times daily , Disp: , Rfl:     valACYclovir (VALTREX) 1 G tablet, Take 1,000 mg by mouth 2 times daily As needed, Disp: , Rfl:     b complex vitamins capsule, Take 1 capsule by mouth daily, Disp: , Rfl:     diphenhydrAMINE (BENADRYL) 25 MG capsule, Take 25 mg by mouth as needed 1 OR 2 TABLETS  D/T MIGRAINE, Disp: , Rfl:     montelukast (SINGULAIR) 10 MG tablet, Take 10 mg by mouth nightly., Disp: , Rfl:     simvastatin (ZOCOR) 20 MG tablet, Take 20 mg by mouth every morning (before breakfast) , Disp: , Rfl:     ranitidine (ZANTAC) 300 MG tablet, Take 300 mg by mouth nightly., Disp: , Rfl:     Acetaminophen (TYLENOL ARTHRITIS EXT BMI 23.73 kg/m²       Skin:  Upon inspection: the skin appears warm, dry and intact. There is  a previous scar over the affected area. There is not any cellulitis, lymphedema or cutaneous lesions noted in the lower extremities. Upon palpation there is no induration noted. Neurologic:  Gait: normal;  Motor exam of the upper extremities show: The reflexes in biceps/triceps/brachioradialis are equal and symmetric. Sensory exam C5-T1 are normal bilaterally. Cardiovascular: The vascular exam is normal and is well perfused to distal extremities. There are 2+ radial pulses bilaterally, and motor and sensation is intact to median, ulnar, and radial, musclocutaneus, and axillary nerve distribution and grossly symmetric bilaterally. There is cap refill noted less than two seconds in all digits. There is not edema of the bilateral upper extremities. There is not varicosities noted in the distal extremities. Lymph:  Upon palpation,  there is no lymphadenopathy noted in bilateral upper extremities. Musculoskeletal:  Gait: normal; examination of the nails and digits reveal no cyanosis or clubbing. Cervical Exam:  On physical exam, Sharon Cherry is well-developed, well-nourished, oriented to person, place and time. her gait is normal.  On evaluation of her cervical spine, she has full range of motion of the cervical spine without pain. There is no cervical tenderness to palpation. Shoulder Exam:   On evaluation of her bilaterally upper extremities, her right shoulder has no deformity. There is tenderness upon palpation of the shoulder joint. There is not evidence of scapular dyskinesis. There is not muscle atrophy in shoulder girdle. The range of motion for the Right Shoulder is 150/45/t8 and for the Left shoulder is 180/50/t10. Right shoulder Motor strength is 4/5 in the supraspinatus, 5/5 internal rotation and 4/5 in external rotation, and Left shoulder motor strength 5/5 in supraspinatus, 5/5 in internal rotation, 5/5 in external rotation. Right shoulder:  positiveImpingement , positive Holliday ,negative  Speeds,negative  Apprehension ,negative Owen Load Shift, negative Jeffery manuver, negative Cross arm test.     Left shoulder:  negative Impingement , negative Holliday ,negative  Speeds,negative  Apprehension ,negative Owen Load Shift, negative Jeffery manuver, negative Cross arm test.     MRI:  1. Tearing of the rotator cuff noted without evidence of a recurrent   or complete tear. 2. Postsurgical changes. Radiographic findings reviewed with patient        Impression:       Encounter Diagnoses   Name Primary?  Biceps tendon tear Yes    Shoulder impingement, right     Impingement syndrome of right shoulder     Cervical stenosis of spine        Plan:  I had a lengthy discussion with the patient regarding their diagnosis. I explained treatment options including surgical vs non surgical treatment. I reviewed in detail the risks and benefits and outlined the procedure in detail with expected outcomes and possible complications. I also discussed non surgical treatment such as injections (CSI), physical therapy, topical creams and NSAID's. They have elected for conservative management at this time. Patient does not wish to pursue conservative treatments options that were offered. I discussed some of her pain is cervical. I offered referral to PMR, she declined. I also offered PT and CSI, she also declined. She will follow up as needed.   Will refer to shakeel if she wishes

## 2020-07-30 ENCOUNTER — HOSPITAL ENCOUNTER (OUTPATIENT)
Age: 65
Discharge: HOME | End: 2020-07-30
Payer: MEDICARE

## 2020-07-30 LAB
ALBUMIN SERPL-MCNC: 4.6 G/DL (ref 3.5–5.2)
ALP BLD-CCNC: 52 U/L (ref 35–104)
ALT SERPL-CCNC: 25 U/L (ref 0–32)
ANION GAP SERPL CALCULATED.3IONS-SCNC: 13 MMOL/L (ref 7–16)
AST SERPL-CCNC: 30 U/L (ref 0–31)
BASOPHILS ABSOLUTE: 0.02 E9/L (ref 0–0.2)
BASOPHILS RELATIVE PERCENT: 0.3 % (ref 0–2)
BILIRUB SERPL-MCNC: 0.2 MG/DL (ref 0–1.2)
BUN BLDV-MCNC: 10 MG/DL (ref 8–23)
CALCIUM SERPL-MCNC: 9.9 MG/DL (ref 8.6–10.2)
CHLORIDE BLD-SCNC: 103 MMOL/L (ref 98–107)
CO2: 27 MMOL/L (ref 22–29)
CREAT SERPL-MCNC: 0.7 MG/DL (ref 0.5–1)
EOSINOPHILS ABSOLUTE: 0.11 E9/L (ref 0.05–0.5)
EOSINOPHILS RELATIVE PERCENT: 1.8 % (ref 0–6)
FERRITIN: 46 NG/ML
GFR AFRICAN AMERICAN: >60
GFR NON-AFRICAN AMERICAN: >60 ML/MIN/1.73
GLUCOSE BLD-MCNC: 145 MG/DL (ref 74–99)
HCT VFR BLD CALC: 40.9 % (ref 34–48)
HEMOGLOBIN: 13.1 G/DL (ref 11.5–15.5)
IMMATURE GRANULOCYTES #: 0.01 E9/L
IMMATURE GRANULOCYTES %: 0.2 % (ref 0–5)
IRON SATURATION: 22 % (ref 15–50)
IRON: 75 MCG/DL (ref 37–145)
LYMPHOCYTES ABSOLUTE: 2.03 E9/L (ref 1.5–4)
LYMPHOCYTES RELATIVE PERCENT: 34.1 % (ref 20–42)
MCH RBC QN AUTO: 30.9 PG (ref 26–35)
MCHC RBC AUTO-ENTMCNC: 32 % (ref 32–34.5)
MCV RBC AUTO: 96.5 FL (ref 80–99.9)
MONOCYTES ABSOLUTE: 0.58 E9/L (ref 0.1–0.95)
MONOCYTES RELATIVE PERCENT: 9.7 % (ref 2–12)
NEUTROPHILS ABSOLUTE: 3.21 E9/L (ref 1.8–7.3)
NEUTROPHILS RELATIVE PERCENT: 53.9 % (ref 43–80)
PDW BLD-RTO: 14.1 FL (ref 11.5–15)
PLATELET # BLD: 249 E9/L (ref 130–450)
PMV BLD AUTO: 8.9 FL (ref 7–12)
POTASSIUM SERPL-SCNC: 4.8 MMOL/L (ref 3.5–5)
RBC # BLD: 4.24 E12/L (ref 3.5–5.5)
SODIUM BLD-SCNC: 143 MMOL/L (ref 132–146)
TOTAL IRON BINDING CAPACITY: 336 MCG/DL (ref 250–450)
TOTAL PROTEIN: 7.6 G/DL (ref 6.4–8.3)
WBC # BLD: 6 E9/L (ref 4.5–11.5)

## 2020-07-30 PROCEDURE — 85025 COMPLETE CBC W/AUTO DIFF WBC: CPT

## 2020-07-30 PROCEDURE — 83550 IRON BINDING TEST: CPT

## 2020-07-30 PROCEDURE — 83540 ASSAY OF IRON: CPT

## 2020-07-30 PROCEDURE — 82728 ASSAY OF FERRITIN: CPT

## 2020-07-30 PROCEDURE — 80053 COMPREHEN METABOLIC PANEL: CPT

## 2020-07-30 PROCEDURE — 36415 COLL VENOUS BLD VENIPUNCTURE: CPT

## 2020-08-06 ENCOUNTER — OFFICE VISIT (OUTPATIENT)
Dept: CARDIOLOGY CLINIC | Age: 65
End: 2020-08-06
Payer: MEDICARE

## 2020-08-06 VITALS
SYSTOLIC BLOOD PRESSURE: 100 MMHG | WEIGHT: 147 LBS | HEART RATE: 57 BPM | HEIGHT: 65 IN | DIASTOLIC BLOOD PRESSURE: 60 MMHG | BODY MASS INDEX: 24.49 KG/M2

## 2020-08-06 PROCEDURE — 99214 OFFICE O/P EST MOD 30 MIN: CPT | Performed by: INTERNAL MEDICINE

## 2020-08-06 PROCEDURE — 93000 ELECTROCARDIOGRAM COMPLETE: CPT | Performed by: INTERNAL MEDICINE

## 2020-08-06 RX ORDER — LOPERAMIDE HYDROCHLORIDE 2 MG/1
1 CAPSULE ORAL DAILY
COMMUNITY
End: 2022-04-12

## 2020-08-06 NOTE — PROGRESS NOTES
OFFICE VISIT        PRIMARY CARE PHYSICIAN:    [address] [facility],        ALLERGIES / SENSITIVITIES:    Allergies   Allergen Reactions    Codeine Swelling     Pure codeine gets itching    Macrobid [Nitrofurantoin Monohydrate Macrocrystals] Rash    Neosporin [Neomycin-Polymyx-Gramicid] Dermatitis    Pcn [Penicillins] Hives     On stomach and thighs      Ultram [Tramadol Hcl] Itching     & hallucinations      Sulfa Antibiotics      Skin turns red          REVIEWED MEDICATIONS:      Current Outpatient Medications:     loperamide (IMODIUM) 2 MG capsule, Take 1 mg by mouth Daily, Disp: , Rfl:     oxyCODONE-acetaminophen (PERCOCET) 5-325 MG per tablet, TAKE ONE TABLET BY MOUTH EVERY 8 HOURS AS NEEDED, Disp: , Rfl:     metFORMIN (GLUCOPHAGE-XR) 500 MG extended release tablet, TAKE TWO TABLETS BY MOUTH TWO TIMES A DAY, Disp: , Rfl:     Blood Glucose Monitoring Suppl (ACCU-CHEK GUIDE ME) w/Device KIT, use as directed, Disp: , Rfl: 0    buPROPion (WELLBUTRIN SR) 150 MG extended release tablet, TAKE 1 TABLET BY MOUTH ONCE DAILY, Disp: , Rfl: 0    famotidine (PEPCID) 40 MG tablet, TAKE ONE TABLET BY MOUTH EVERY DAY, Disp: , Rfl: 1    ACCU-CHEK GUIDE strip, USE AS DIRECTED TO TEST BLOOD SUGAR 2-3 TIMES DAILY, Disp: , Rfl: 5    ACCU-CHEK FASTCLIX LANCETS MISC, USE 2-3 TIMES DAILY, Disp: , Rfl: 5    aspirin 81 MG tablet, Take 81 mg by mouth daily , Disp: , Rfl:     Dulaglutide (TRULICITY SC), Inject 7.14 mg into the skin every 7 days Q Tuesdays, Disp: , Rfl:     loxapine (LOXITANE) 5 MG capsule, Take 10 mg by mouth nightly , Disp: , Rfl:     B Complex-Biotin-FA (B-50 COMPLEX PO), Take by mouth 2 times daily, Disp: , Rfl:     ondansetron (ZOFRAN) 4 MG tablet, Take 4 mg by mouth every 8 hours as needed for Nausea or Vomiting, Disp: , Rfl:     propranolol (INDERAL) 20 MG tablet, Take 40 mg by mouth 2 times daily as needed 6 daily, Disp: , Rfl:     sucralfate (CARAFATE) 1 GM tablet, Take 1 g by mouth daily , Disp:  Denies any unusual headaches.  Denies recent changes in hearing or vision.  Denies dysphagia, hoarseness, hemoptysis, hematemesis or epistaxis. ENDOCRINE:  Denies polyphagia, polydipsia or polyuria.  Denies heat or cold intolerance. MUSCULOSKELETAL:  She has right shoulder discomfort. SKIN:  Denies rashes, ulcers or itching. HEME/LYMPH:  Denies any palpable lymph nodes, bleeding or easy bruisability. HEART:  As above. LUNGS:  Denies any significant cough or sputum production. GI: she has epigastric discomfort.  Denies nausea, vomiting, diarrhea, constipation, rectal bleeding or tarry stools. :  Denies hematuria or dysuria. PSYCHIATRIC:  Has history of bipolar depression and schizophrenia but denies any recent mood changes or anxiety. NEUROLOGIC:  She has tardive dyskinesia.  Denies memory loss, motor weakness, numbness, tingling or tremors.                    CARDIOVASCULAR HISTORY:    1.  Reported vertebrobasilar transient ischemic attack in 05/2003 with no recurrence. 2.  Cardiac catheterization 11/27/2001.  Patent coronary arteries.  Normal LV function. 3.  Lexiscan nuclear stress test done 02/15/2014 was within normal limits with no evidence of scars or stress-induced ischemia with no regional wall motion abnormality and with a calculated ejection fraction of 68%. 4.  Echocardiogram done 02/15/2014 was read as showing normal left ventricular size, wall thickness, wall motion and systolic function with an ejection fraction of 64% with stage I left ventricular diastolic dysfunction. Tad Bianca sclerotic aortic valve without hemodynamically significant stenosis and without regurgitation.  Mild mitral valve prolapse without mitral regurgitation.    5.  Hyperlipidemia. 6.  Diabetes mellitus. 7.  Pericarditis in [address]:  1.  As under cardiovascular history. 2.  Arthritis of the spine. 3.  Asthma, history of bronchitis and history of pneumonia.   4.  Bipolar depression and schizophrenia. 5.  History of breast lumpectomies.  History of left breast cancer, status post lumpectomy and radiation therapy. 6.  History of diverticulitis. 7.  History of irritable bowel syndrome. 8.  History of hiatal hernia and GERD. Endoscopy in 2019 reportedly showed superficial ulcers. 9.  Fibromyalgia. 10.  History of herpes genitalia.    11.  Fatty liver. 12.  History of migraines. 13.  History of neuropathy of feet. 14.  Osteopenia. 15.  History of pancreatitis. 12.  History of abdominal surgery for removal of polyps and history of exploratory laparotomy for removal of scar tissue. 16.  History of appendectomy. 25.  History of cholecystectomy. 23.  History of hysterectomy. 20.  History of colon surgery. 21.  History of colonoscopy. 22.  History of cystoscopy. 23.  History of bilateral cataract removal with lens implants. 24.  History of Meckel's diverticulum. 25.  History of migraine headaches. 26.  History of ovarian surgery. 32.  History of excision of birthmark from face and arm. 28.  History of nerve block to the left ilioinguinal nerve. 29.  Tardive dyskinesia. 30. Right shoulder impingement syndrome and incomplete repair of right rotator cuff. MRI done on 2/3/2020 (after falling, hitting right shoulder) showed tearing of the rotator cuff without evidence of a recurrent or complete tear and with post surgical changes.       FAMILY HISTORY:  Mother  at age 80, had bypass surgery at age 80/80. Father  of a stroke at age 80.     SOCIAL HISTORY:  Patient does not smoke.  She rarely drinks alcohol.        O:  COMPLETE PHYSICAL EXAM:      /60 (Site: Right Upper Arm, Position: Sitting, Cuff Size: Medium Adult)   Pulse 57   Ht 5' 5\" (1.651 m)   Wt 147 lb (66.7 kg)   BMI 24.46 kg/m²      General:          Well-developed, well-nourished lady in no acute distress. HEENT:           Normocephalic and atraumatic head. No JVD. No carotid bruits.  Carotid upstrokes normal bilaterally.  No thyromegaly.  Sclerae not icteric.  No xanthelasmas.  Mucous membranes moist.  Chest:              Symmetrical and nontender.  No deformities. Lungs:             Clear to auscultation bilaterally. Heart:              Normal S1 and S2.  No S3 or S4.  No murmurs or rubs. Abdomen:        Soft, nontender without organomegaly or masses.  No bruits.  Normal bowel sounds. Extremities:     No edema.  Pulses normal.  Skin:                Normal turgor. No rashes or ulcers noted. Neurologic:      Oriented x3.  No motor or sensory deficits detected. REVIEW OF DIAGNOSTIC TESTS:    1. Hemoglobin A1C from 8/6/2020:  6.4.    2.  Blood tests from 7/30/2020 reviewed:  Ferritin, iron and iron binding capacity, normal.  CBC unremarkable. Glucose 145, BUN 10, creatinine 0.7, GFR > 60, potassium 4.8, LFT's normal.  Rest of CMP within normal limits. 3.  EKG done today showed sinus bradycardia with a heart rate of 57 bpm, but was otherwise within normal limits. ASSESSMENT / DIAGNOSIS:   1.  Mild valvular heart disease on echocardiogram in 2014 with mild aortic sclerosis without stenosis or regurgitation and mild mitral valve prolapse without mitral regurgitation. 2.  Diabetes mellitus. 3.  Hyperlipidemia. 4.  History of pericarditis. 5.  History of hiatal hernia and GERD. 6.  History of irritable bowel syndrome and history of diverticulitis. 7.  Fatty liver. 8.  Bipolar depression. 9.  Schizophrenia. 10. History of left breast cancer, S/P lumpectomy and radiation therapy. 11. Asthma with a history of bronchitis and pneumonia. 12. Arthritis of the spine. 13. Fibromyalgia. 14. Osteopenia. 15. Right shoulder impingement syndrome and partial rotator cuff tear. 16. Tardive dyskinesia. TREATMENT PLAN:  1. Reassure. 2.  Consider checking lipid profile (if not done today, by her PCP). 3.  Patient advised to remain active.    4.  Echocardiogram for follow up mitral valve prolapse/mitral regurgitation. 5.  Follow up with Cardiology in 1 year or on a prn basis, pending the results of the echocardiogram.          [address]  [place] [id number] [phone]

## 2020-09-04 ENCOUNTER — TELEPHONE (OUTPATIENT)
Dept: CARDIOLOGY | Age: 65
End: 2020-09-04

## 2020-09-08 ENCOUNTER — HOSPITAL ENCOUNTER (OUTPATIENT)
Dept: CARDIOLOGY | Age: 65
Discharge: HOME | End: 2020-09-08
Payer: MEDICARE

## 2020-09-08 LAB
LV EF: 60 %
LVEF MODALITY: NORMAL

## 2020-09-08 PROCEDURE — 93306 TTE W/DOPPLER COMPLETE: CPT

## 2020-09-09 ENCOUNTER — TELEPHONE (OUTPATIENT)
Dept: CARDIOLOGY CLINIC | Age: 65
End: 2020-09-09

## 2020-10-05 ENCOUNTER — HOSPITAL ENCOUNTER (OUTPATIENT)
Dept: ULTRASOUND IMAGING | Age: 65
Discharge: HOME | End: 2020-10-05
Payer: MEDICARE

## 2020-10-05 PROCEDURE — 93880 EXTRACRANIAL BILAT STUDY: CPT

## 2020-10-08 ENCOUNTER — HOSPITAL ENCOUNTER (OUTPATIENT)
Dept: MRI IMAGING | Age: 65
Discharge: HOME | End: 2020-10-10
Payer: MEDICARE

## 2020-10-08 PROCEDURE — 70551 MRI BRAIN STEM W/O DYE: CPT

## 2020-10-09 NOTE — PROGRESS NOTES
[facility]. [employer] [facility], [place]        Pt Name: Susan Awad: 1955  Date of evaluation: 10/12/2020  Primary Care Physician: Suly Armando DO  Reason for evaluation:   Chief Complaint   Patient presents with    Breast Cancer     Left  ER+    1 Year Follow Up          Subjective:  Here for yearly follow-up. Feels better than her hemoglobin is improved to the normal range. Was C/O dizziness and giddiness,headaches . MRI Brain was ordered by PCP. It was negative except for mild sphenoid sinusitis. C/O right shoulder pain. Received cortisone injection right shoulder by Dr. Marshal Valdez on 3/18/19. It helped her pain temporarily. S/P right shoulder arthroscopy with subacromial arch decompression,  Labral debridement, Debridement of partial thickness rotator cuff tear, and  biceps tenotomy on 8/30/2019 by Dr. Syeda Hernández. OBJECTIVE:  VITALS:  height is 5' 5\" (1.651 m) and weight is 147 lb (66.7 kg). Her temporal temperature is 96.5 °F (35.8 °C). Her blood pressure is 122/77 and her pulse is 65. Her oxygen saturation is 95%. Physical Exam:  Performance Status: 0  Well developed, well nourished female  EYES: sclera non-icteric   ENT: oropharynx clear. NECK: No lymphadenopathy. Mild soft tissue swelling in the right neck without palpable lymphadenopathy. BREASTS :Firmness and tenderness along lump in left breast with a small 0.5-1 cm nodule felt just inferior to scar. HEART: Regular rate and rhythm. LUNGS: Clear. ABDOMEN: Soft, non-tender. No mass or organomegaly. EXTREMITIES: without edema. NEUROLOGIC: No focal deficits. Medications  Prior to Admission medications    Medication Sig Start Date End Date Taking?  Authorizing Provider   loperamide (IMODIUM) 2 MG capsule Take 1 mg by mouth Daily    Historical Provider, MD   oxyCODONE-acetaminophen (PERCOCET) 5-325 MG per tablet TAKE ONE TABLET BY MOUTH EVERY 8 HOURS AS NEEDED 6/10/20 Historical Provider, MD   metFORMIN (GLUCOPHAGE-XR) 500 MG extended release tablet TAKE TWO TABLETS BY MOUTH TWO TIMES A DAY 12/23/19   Historical Provider, MD   Blood Glucose Monitoring Suppl (ACCU-CHEK GUIDE ME) w/Device KIT use as directed 11/6/19   Historical Provider, MD   buPROPion (WELLBUTRIN SR) 150 MG extended release tablet TAKE 1 TABLET BY MOUTH ONCE DAILY 12/9/19   Historical Provider, MD   famotidine (PEPCID) 40 MG tablet TAKE ONE TABLET BY MOUTH EVERY DAY 11/3/19   Historical Provider, MD   ACCU-CHEK GUIDE strip USE AS DIRECTED TO TEST BLOOD SUGAR 2-3 TIMES DAILY 11/4/19   Historical Provider, MD   ACCU-CHEK FASTCLIX LANCETS MISC USE 2-3 TIMES DAILY 11/6/19   Historical Provider, MD   aspirin 81 MG tablet Take 81 mg by mouth daily     Historical Provider, MD   Dulaglutide (TRULICITY SC) Inject 8.31 mg into the skin every 7 days Q Tuesdays    Historical Provider, MD   loxapine (LOXITANE) 5 MG capsule Take 10 mg by mouth nightly     Historical Provider, MD   B Complex-Biotin-FA (B-50 COMPLEX PO) Take by mouth 2 times daily    Historical Provider, MD   ondansetron (ZOFRAN) 4 MG tablet Take 4 mg by mouth every 8 hours as needed for Nausea or Vomiting    Historical Provider, MD   propranolol (INDERAL) 20 MG tablet Take 40 mg by mouth 2 times daily as needed 6 daily    Historical Provider, MD   sucralfate (CARAFATE) 1 GM tablet Take 1 g by mouth daily     Historical Provider, MD   valACYclovir (VALTREX) 1 G tablet Take 1,000 mg by mouth 2 times daily As needed    Historical Provider, MD   montelukast (SINGULAIR) 10 MG tablet Take 10 mg by mouth nightly. Historical Provider, MD   simvastatin (ZOCOR) 20 MG tablet Take 20 mg by mouth every morning (before breakfast)     Historical Provider, MD   ascorbic acid (VITAMIN C) 500 MG tablet Take 500 mg by mouth 2 times daily. Historical Provider, MD   Potassium 99 MG TABS Take 1 capsule by mouth daily.     Historical Provider, MD   CALCIUM-VITAMIN D PO Take 1 tablet by mouth daily 1200 mg/ 25mg    Historical Provider, MD   losartan (COZAAR) 25 MG tablet Take 25 mg by mouth daily. Historical Provider, MD   Multiple Vitamin (MULTIVITAMIN PO) Take 1 tablet by mouth once. Historical Provider, MD   fish oil-omega-3 fatty acids 1000 MG capsule Take 1 g by mouth 3 times daily     Historical Provider, MD   Sertraline HCl (ZOLOFT PO) Take 200 mg by mouth nightly     Historical Provider, MD    Scheduled Meds:  Continuous Infusions:  PRN Meds:.        Recent Laboratory Data-     Lab Results   Component Value Date    WBC 6.0 07/30/2020    HGB 13.1 07/30/2020    HCT 40.9 07/30/2020    MCV 96.5 07/30/2020     07/30/2020    LYMPHOPCT 34.1 07/30/2020    RBC 4.24 07/30/2020    MCH 30.9 07/30/2020    MCHC 32.0 07/30/2020    RDW 14.1 07/30/2020    NEUTOPHILPCT 53.9 07/30/2020    MONOPCT 9.7 07/30/2020    BASOPCT 0.3 07/30/2020    NEUTROABS 3.21 07/30/2020    LYMPHSABS 2.03 07/30/2020    MONOSABS 0.58 07/30/2020    EOSABS 0.11 07/30/2020    BASOSABS 0.02 07/30/2020       Lab Results   Component Value Date     07/30/2020    K 4.8 07/30/2020     07/30/2020    CO2 27 07/30/2020    BUN 10 07/30/2020    CREATININE 0.7 07/30/2020    GLUCOSE 145 (H) 07/30/2020    CALCIUM 9.9 07/30/2020    PROT 7.6 07/30/2020    LABALBU 4.6 07/30/2020    BILITOT 0.2 07/30/2020    ALKPHOS 52 07/30/2020    AST 30 07/30/2020    ALT 25 07/30/2020    LABGLOM >60 07/30/2020    GFRAA >60 07/30/2020         Lab Results   Component Value Date    IRON 75 07/30/2020    TIBC 336 07/30/2020    FERRITIN 46 07/30/2020          Ref. Range 3/17/2014 11:39 10/27/2014 11:18 4/27/2015 14:48 11/4/2015 15:08 5/4/2016 13:19   CA 15-3 Latest Ref Range: 0 - 31 U/mL 10 10 9 9 10           Radiology-  MRI BRAIN:  10/08/2020  No acute intracranial abnormality.         New mild chronic microvascular disease within the periventricular white    matter.         Moderate mucosal thickening within the left sphenoid sinus. US CAROTID ARTERY BILATERAL:  10/05/2020. There is no measurable stenosis of the right or left carotid arteries.  No    plaque is appreciated.         Bilateral vertebral arteries are patent with flow in the normal direction.               SCREENING MAMMOGRAM BILATERAL:  11/06/2019  1. Stable mammogram without evidence of malignancy.           Recommendation:      1. Annual screening mammography is recommended.              BI-RADS Category:  1 - Negative              CT ABDOMEN AND PELVIS:  2/6/19  1. Tiny focus of air within the urinary bladder, possibly related to   recent instrumentation or fistula. A small amount of air has been   noted within the bladder dating back to CTs from 9/28/2014. There is   no definite focal bladder wall thickening. 2. Bilateral kidneys are normal in morphology and demonstrate   symmetric enhancement. ASSESSMENT/PLAN :  Lori Pizarro woman with DCIS involving the left breast, diagnosed in Nov 2010,status post excision with clear margins. She completed radiation to the left breast . Pt had positive ER and MI receptors. She was not a good candidate for Tamoxifen in the preventive setting because of severe bipolar disorder,and the fact that she has had in the past TIA and has been on aspirin and Persantine. So in order to prevent potential for exacerbation of depression or thromboembolic events, patient was simply recommended surveillance and she continues to do well at this time without any evidence of disease recurrence. She had follow up mammogram in OCT 2018 which was negative      2- Anemia By Hx diagnosed in [address] [facility] [address] 2013 likely related to post operative state with operative blood losses ,iron deficiency and myelosuppression by cellulitis and abdominal wall infection . She also had NSAID induced gastritis . She has been intolerant of oral Iron supplements with dyspepsia and constipation and was recommended parenteral IV iron .   She responded well with normalization of her Hb and iron levels. To continue Protonix and Zantac. Had follow up EGD with Dr Irma Wellington in MAY with findings of healed ulcers . Her repeat CBC showed recurrent anemia with a hemoglobin of 11 and her iron studies are consistent with iron deficiency anemia. The fact that she is on Zantac and Pantoprazole with lack of stomach acidity, her oral iron absorption is impaired and she will be recommended parenteral IV iron with Feraheme.    Her anemia is likely related to GI blood losses from gastritis as well as colitis. Her last colonoscopy in 2016 had shown only colitis  She responded well to parenteral IV iron and her hemoglobin is up to normal range 12.1 on 5/16/18  It is slightly down to 11.1 In December 2018  She responded well to parenteral IV iron And her hemoglobin today is up to 13.3     3- Abdominal wall cellulitis resolved .      4-Mildly elevated transaminases chronic in nature and attributed to fatty liver secondary to diabetes  Most recent hepatic panel is back to normal range      Ultrasound of thyroid and neck was entirely negative   She has been diagnosed on CT scan with small amount of air in her urinary bladder with suspicion of a fistula  and she had a cystoscopy 2/25/19. Heddy  5-Recent headaches and dizziness. MRI of brain negative except for sphenoid sinusitis. She has been feeling better and her headaches resolved after treatment of her sinusitis  She will have follow-up and mammogram in November 2020  Continue surveillance on a yearly basis.         Yady Soria. Eric Dyer M.D., F.A.C.P.   Electronically signed 10/12/2020 at 11:01 AM

## 2020-10-12 ENCOUNTER — HOSPITAL ENCOUNTER (OUTPATIENT)
Dept: INFUSION THERAPY | Age: 65
Discharge: HOME | End: 2020-10-12
Payer: MEDICARE

## 2020-10-12 ENCOUNTER — OFFICE VISIT (OUTPATIENT)
Dept: ONCOLOGY | Age: 65
End: 2020-10-12
Payer: MEDICARE

## 2020-10-12 VITALS
BODY MASS INDEX: 24.49 KG/M2 | WEIGHT: 147 LBS | HEIGHT: 65 IN | OXYGEN SATURATION: 95 % | DIASTOLIC BLOOD PRESSURE: 77 MMHG | TEMPERATURE: 96.5 F | SYSTOLIC BLOOD PRESSURE: 122 MMHG | HEART RATE: 65 BPM

## 2020-10-12 DIAGNOSIS — C50.212 MALIGNANT NEOPLASM OF UPPER-INNER QUADRANT OF LEFT FEMALE BREAST, UNSPECIFIED ESTROGEN RECEPTOR STATUS (HCC): ICD-10-CM

## 2020-10-12 DIAGNOSIS — Z17.0 ER+ (ESTROGEN RECEPTOR POSITIVE STATUS): ICD-10-CM

## 2020-10-12 LAB
ALBUMIN SERPL-MCNC: 4.2 G/DL (ref 3.5–5.2)
ALP BLD-CCNC: 49 U/L (ref 35–104)
ALT SERPL-CCNC: 22 U/L (ref 0–32)
ANION GAP SERPL CALCULATED.3IONS-SCNC: 9 MMOL/L (ref 7–16)
AST SERPL-CCNC: 30 U/L (ref 0–31)
BASOPHILS ABSOLUTE: 0.04 E9/L (ref 0–0.2)
BASOPHILS RELATIVE PERCENT: 0.7 % (ref 0–2)
BILIRUB SERPL-MCNC: <0.2 MG/DL (ref 0–1.2)
BUN BLDV-MCNC: 10 MG/DL (ref 8–23)
CALCIUM SERPL-MCNC: 9.6 MG/DL (ref 8.6–10.2)
CHLORIDE BLD-SCNC: 103 MMOL/L (ref 98–107)
CO2: 28 MMOL/L (ref 22–29)
CREAT SERPL-MCNC: 0.8 MG/DL (ref 0.5–1)
EOSINOPHILS ABSOLUTE: 0.14 E9/L (ref 0.05–0.5)
EOSINOPHILS RELATIVE PERCENT: 2.6 % (ref 0–6)
GFR AFRICAN AMERICAN: >60
GFR NON-AFRICAN AMERICAN: >60 ML/MIN/1.73
GLUCOSE BLD-MCNC: 115 MG/DL (ref 74–99)
HCT VFR BLD CALC: 38.6 % (ref 34–48)
HEMOGLOBIN: 12.3 G/DL (ref 11.5–15.5)
IMMATURE GRANULOCYTES #: 0.04 E9/L
IMMATURE GRANULOCYTES %: 0.7 % (ref 0–5)
LYMPHOCYTES ABSOLUTE: 2.3 E9/L (ref 1.5–4)
LYMPHOCYTES RELATIVE PERCENT: 42.2 % (ref 20–42)
MCH RBC QN AUTO: 30.8 PG (ref 26–35)
MCHC RBC AUTO-ENTMCNC: 31.9 % (ref 32–34.5)
MCV RBC AUTO: 96.7 FL (ref 80–99.9)
MONOCYTES ABSOLUTE: 0.53 E9/L (ref 0.1–0.95)
MONOCYTES RELATIVE PERCENT: 9.7 % (ref 2–12)
NEUTROPHILS ABSOLUTE: 2.4 E9/L (ref 1.8–7.3)
NEUTROPHILS RELATIVE PERCENT: 44.1 % (ref 43–80)
PDW BLD-RTO: 13.4 FL (ref 11.5–15)
PLATELET # BLD: 264 E9/L (ref 130–450)
PMV BLD AUTO: 8.8 FL (ref 7–12)
POTASSIUM SERPL-SCNC: 4.5 MMOL/L (ref 3.5–5)
RBC # BLD: 3.99 E12/L (ref 3.5–5.5)
SODIUM BLD-SCNC: 140 MMOL/L (ref 132–146)
TOTAL PROTEIN: 7.2 G/DL (ref 6.4–8.3)
WBC # BLD: 5.5 E9/L (ref 4.5–11.5)

## 2020-10-12 PROCEDURE — 85025 COMPLETE CBC W/AUTO DIFF WBC: CPT

## 2020-10-12 PROCEDURE — 36415 COLL VENOUS BLD VENIPUNCTURE: CPT

## 2020-10-12 PROCEDURE — 99213 OFFICE O/P EST LOW 20 MIN: CPT | Performed by: INTERNAL MEDICINE

## 2020-10-12 PROCEDURE — 80053 COMPREHEN METABOLIC PANEL: CPT

## 2020-11-05 NOTE — PROGRESS NOTES
[facility]. Syed Alvarado M.D., F.A.C.P. Shira Russ, DNP, APRN, CNS  Adrián Hernandes.  Diane Elder, MSN, APRN-FNP-C  David Allan MSN, APRN, FNP-C  Liz HOUSE, PA-C  [address] MSN, APRN, FNP-C  [address] [facility]  [facility], [id number] [place] [address] Rd  Phone: [phone]  Fax: [phone]       Jaleel Sifuentes is a 59 y.o. right handed female     Past Medical History:     Past Medical History:   Diagnosis Date    Arthritis     spine    Asthma     Bipolar depression ([address]     Breast cancer ([address] 2010    breast left  ( lumpectomt and radiation)    Bronchitis     Colitis     Diabetes mellitus ([address]     Diabetic peripheral neuropathy ([address]     of feet    Diverticulitis     Fatty liver     Fibromyalgia     GERD (gastroesophageal reflux disease)     Herpes     herpes 2 genitalia    Hiatal hernia     Hyperlipidemia     Hypertension     Migraines     Osteopenia     Pancreatitis     Pericarditis [id number]    had several episodes during this time span    Pneumonia oct 1991    Stomach ulcer     TIA (transient ischemic attack) 2008    no deficits     Past Surgical History:       Past Surgical History:   Procedure Laterality Date    ABDOMINAL EXPLORATION SURGERY  2010    exploratory for scar tissue    APPENDECTOMY      BREAST LUMPECTOMY Left 10/14/2015    excision palpable left breast lesion     CARDIAC CATHETERIZATION      heart cath many yrs ago states was normal    CATARACT REMOVAL WITH IMPLANT Left 2/26/13    /with PC IOL    CATARACT REMOVAL WITH IMPLANT Right 3 5 13    CHOLECYSTECTOMY      CYSTOSCOPY  11/04/2016    retrograde pylogram/Dr. Jenny Bland    CYSTOSCOPY W BIOPSY OF BLADDER N/A 2/25/2019    CYSTOSCOPY, RETROGRADE, PYELOGRAM  AND CYSTOGRAM performed by Edgar Sharp MD at [facility] [place] Right 7/16/2019    TRIGGER FINGER RELEASE RIGHT THUMB performed by Karla Valencia DO at [facility] OR    HERNIA REPAIR      HYSTERECTOMY  1985    MECKEL DIVERTICULUM EXCISION      NERVE BLOCK Left 05/09/2017    left ileoinjuinal nerve block #1    NERVE BLOCK Left 05/23/2017    left illioinguinal nerve block #2    OTHER SURGICAL HISTORY      birthmark face and arm    OVARY SURGERY Bilateral 1986    removed    RECTOCELE REPAIR      SHOULDER ARTHROSCOPY Right 8/30/2019    RIGHT SHOULDER ARTHROSCOPY., BICEP TENOLYSIS  AND DEBRIDEMENT LABRUM AND ROTATOR CUFF performed by Shahram Ham DO at [address] [facility]      mouth & under left breast     Allergies:       Codeine; Macrobid [nitrofurantoin monohydrate macrocrystals]; Neosporin [neomycin-polymyx-gramicid]; Pcn [penicillins];  Ultram [tramadol hcl]; and Sulfa antibiotics    Medications:     Current Outpatient Medications   Medication Sig Dispense Refill    meclizine (ANTIVERT) 25 MG tablet Take 1 tablet by mouth every 8 hours as needed      loperamide (IMODIUM) 2 MG capsule Take 1 mg by mouth Daily      oxyCODONE-acetaminophen (PERCOCET) 5-325 MG per tablet TAKE ONE TABLET BY MOUTH EVERY 8 HOURS AS NEEDED      metFORMIN (GLUCOPHAGE-XR) 500 MG extended release tablet TAKE TWO TABLETS BY MOUTH TWO TIMES A DAY      buPROPion (WELLBUTRIN SR) 150 MG extended release tablet TAKE 1 TABLET BY MOUTH ONCE DAILY  0    famotidine (PEPCID) 40 MG tablet TAKE ONE TABLET BY MOUTH EVERY DAY  1    aspirin 81 MG tablet Take 81 mg by mouth daily       Dulaglutide (TRULICITY SC) Inject 9.93 mg into the skin every 7 days Q Tuesdays      loxapine (LOXITANE) 10 MG capsule Take 10 mg by mouth nightly       B Complex-Biotin-FA (B-50 COMPLEX PO) Take by mouth 2 times daily      ondansetron (ZOFRAN) 4 MG tablet Take 4 mg by mouth every 8 hours as needed for Nausea or Vomiting      propranolol (INDERAL) 20 MG tablet Take 20 mg by mouth 2 times daily       sucralfate (CARAFATE) 1 GM tablet Take 1 g by mouth daily       valACYclovir (VALTREX) 1 G tablet Take 1,000 mg by mouth 2 times daily As needed      montelukast (SINGULAIR) 10 MG tablet Take 10 mg by mouth nightly.  simvastatin (ZOCOR) 20 MG tablet Take 20 mg by mouth every morning (before breakfast)       ascorbic acid (VITAMIN C) 500 MG tablet Take 500 mg by mouth 2 times daily.  Potassium 99 MG TABS Take 1 capsule by mouth daily.  CALCIUM-VITAMIN D PO Take 1 tablet by mouth daily 1200 mg/ 25mg      losartan (COZAAR) 25 MG tablet Take 25 mg by mouth daily.  Multiple Vitamin (MULTIVITAMIN PO) Take 1 tablet by mouth once.  fish oil-omega-3 fatty acids 1000 MG capsule Take 1 g by mouth 3 times daily       sertraline (ZOLOFT) 100 MG tablet Take 100 mg by mouth nightly        Current Facility-Administered Medications   Medication Dose Route Frequency Provider Last Rate Last Dose    perflutren lipid microspheres (DEFINITY) injection 1.65 mg  1.5 mL Intravenous ONCE PRN Rohit Tinsley MD         Social History:       She is  with 1 child  She is currently on disability    No history of tobacco, EtOH, or illicit drugs    Review of Systems:     No chest pain or palpitations  No SOB  No vertigo, lightheadedness or loss of consciousness  No falls, tripping or stumbling  No incontinence of bowels or bladder  No itching or bruising appreciated  No numbness, tingling or focal arm/leg weakness    ROS is otherwise negative    Family History:     Family History   Problem Relation Age of Onset    Kidney Disease Mother     Heart Disease Mother     Stroke Father      History of Present Illness: The patient was referred by Dr. Michael Lacey for TIA    She presents alone and has an extensive past medical history significant for left breast cancer s/p lumpectomy and radiation, controlled diabetes, peripheral neuropathy, HTN, HLD, migraines, stomach ulcers, bipolar depression, and TIA    13 years ago she suffered what she was told was a TIA.   She developed the sudden onset of left-sided numbness, which lasted for 4 days before complete resolution. She was seen by Dr. Anshul Ocasio at that time and placed on aspirin and Persantine. She took the Persantine for 1 year before stopping, because it gave her bruises. She remains on aspirin 81 mg daily is also on statin. There has been no recurrence of her symptoms since. She has suffered with migraines since her 25s and will occasionally have chiropractic manipulation of her neck when they come on, which completely aborts her headaches. Earlier this month she had another cluster of migraines and an MRI was done which showed scant small vessel disease. She tells me she was told by her provider that she \"needed to come in and get back on a blood thinner for the microvascular vessel disease in my brain. \"  Denies any current migraines or neurologic complaints. Objective:     /75 (Site: Left Upper Arm, Position: Sitting, Cuff Size: Medium Adult)   Pulse 75   Temp 97.4 °F (36.3 °C) (Infrared)   Resp 12   Ht 5' 6\" (1.676 m)   Wt 147 lb (66.7 kg)   SpO2 95%   BMI 23.73 kg/m²     General appearance: alert, appears stated age, cooperative and in no distress  Head: normocephalic, without obvious abnormality, atraumatic  Eyes: conjunctivae/corneas clear; no drainage  Neck: no carotid bruit, full ROM  Back: symmetric, no curvature.  ROM normal.   Lungs: clear to auscultation bilaterally  Heart: regular rate and rhythm--no murmur  Abdomen: soft, non-tender; bowel sounds normal; no masses,  no organomegaly  Extremities: normal, atraumatic, no cyanosis or edema  Pulses: 2+ and symmetric  Skin:  color, texture, turgor normal--no rashes or lesions      Mental Status: alert and oriented x 4--very pleasant    Appropriate attention/concentration  Intact fundus of knowledge  Memories intact    Speech: no dysarthria  Language: no aphasias    Cranial Nerves:  I: smell    II: visual acuity     II: visual fields Full    II: pupils MAGALYS   III,VII: ptosis None images were personally reviewed at the time of this visit    Assessment:     I suspect her event 13 years ago was not a TIA, rather a small, pure sensory stroke, with rapid resolution. TIAs do not last 4 days. Her current MRI shows a scant amount of small vessel disease, secondary to her vascular risk factors, which are currently very well controlled. Her neuro exam is normal.  There is no role for Persantine in secondary stroke prevention anymore, and no need for additional antiplatelet agent at this time. Long term DAPT for small strokes and TIAs has been associated with a higher risk for bleeding, and she has no other large vessel atherosclerosis to warrant this therapy. She suffers from longstanding chronic migraines, which I suspect have cervicogenic triggers and vertiginous components, causing her intermittent bouts of dizziness. There is no evidence of acute brainstem ischemia on her recent MRI, and her current spell has resolved with chiropractic manipulation of her neck. Plan:     Continue ASA/ statin for secondary stroke prevention    No role for additional antiplatelet agent at this time, as explained above    No need for additional neurologic work-up at this time    MRI images reviewed with patient and discussed extensively     Stroke s/s reviewed    See ARMINDA Carver, MONIKA-CNP  4:39 PM  11/5/2020    I spent 32 minutes with this patient obtaining the HPI and discussing the exam with greater than 50% of the time providing counseling and education on medications and other treatment plans. All questions were answered prior to leaving my office.

## 2020-11-06 ENCOUNTER — OFFICE VISIT (OUTPATIENT)
Dept: NEUROLOGY | Age: 65
End: 2020-11-06
Payer: MEDICARE

## 2020-11-06 ENCOUNTER — TELEPHONE (OUTPATIENT)
Dept: NEUROLOGY | Age: 65
End: 2020-11-06

## 2020-11-06 VITALS
RESPIRATION RATE: 12 BRPM | HEIGHT: 66 IN | WEIGHT: 147 LBS | SYSTOLIC BLOOD PRESSURE: 116 MMHG | HEART RATE: 75 BPM | OXYGEN SATURATION: 95 % | TEMPERATURE: 97.4 F | DIASTOLIC BLOOD PRESSURE: 75 MMHG | BODY MASS INDEX: 23.63 KG/M2

## 2020-11-06 PROBLEM — R22.1 NECK SWELLING: Status: RESOLVED | Noted: 2017-08-16 | Resolved: 2020-11-06

## 2020-11-06 PROBLEM — D50.9 IRON DEFICIENCY ANEMIA: Status: RESOLVED | Noted: 2017-08-21 | Resolved: 2020-11-06

## 2020-11-06 PROBLEM — G89.18 POST-OPERATIVE PAIN: Status: RESOLVED | Noted: 2019-02-25 | Resolved: 2020-11-06

## 2020-11-06 PROBLEM — Z86.73 REMOTE HISTORY OF STROKE: Status: ACTIVE | Noted: 2020-11-06

## 2020-11-06 PROBLEM — M79.2 NEUROPATHIC PAIN: Chronic | Status: RESOLVED | Noted: 2017-09-12 | Resolved: 2020-11-06

## 2020-11-06 PROBLEM — G43.109 VERTIGINOUS MIGRAINE: Status: ACTIVE | Noted: 2020-11-06

## 2020-11-06 PROBLEM — G57.90 ILIOINGUINAL NEURALGIA: Chronic | Status: RESOLVED | Noted: 2017-04-13 | Resolved: 2020-11-06

## 2020-11-06 PROCEDURE — 99204 OFFICE O/P NEW MOD 45 MIN: CPT | Performed by: NURSE PRACTITIONER

## 2020-11-06 RX ORDER — MECLIZINE HYDROCHLORIDE 25 MG/1
1 TABLET ORAL EVERY 8 HOURS PRN
COMMUNITY
Start: 2020-10-08

## 2020-11-06 NOTE — PATIENT INSTRUCTIONS
Patient Education        Learning About FAST: Stroke Warning Signs  What is FAST? FAST is a simple way to remember the main symptoms of stroke. Recognizing these symptoms helps you know when to call for medical help. FAST stands for:  · F ace drooping. · A rm weakness. · S peech difficulty. · T ken[place] to call 911. Other stroke symptoms can include sudden confusion, a severe headache, and problems with vision or balance. What happens when you have a stroke? A stroke occurs when a blood vessel to the brain bursts or is blocked by a blood clot. Within minutes, the nerve cells in that part of the brain die. As a result, the part of the body controlled by those cells cannot work properly. The effects of a stroke may range from mild to severe. They may get better, or they may last the rest of your life. A stroke can affect vision, speech, behavior, thought processes, and your ability to move. It can cause symptoms that may include:  · Sudden numbness, tingling, weakness, or loss of movement in your face, arm, or leg, especially on only one side of your body. · Sudden vision changes. · Sudden trouble speaking. · Sudden confusion or trouble understanding simple statements. · Sudden problems with walking or balance. · A sudden, severe headache that is different from past headaches. Why is it important to get help FAST? Quick treatment may save your life. And it may reduce the damage in your brain so that you have fewer problems after the stroke. When you know stroke symptoms, you will know when it's important to call for medical help. Where can you learn more? Go to [email or web address].[email or web address]. org and sign in to your [employer] account. Enter X283 in the [employer] box to learn more about \"Learning About FAST: Stroke Warning Signs. \"     If you do not have an account, please click on the \"Sign Up Now\" link.   Current as of: March 4, 2020               Content Version: 12.6  © 3764-0145 [employer]wise, Incorporated. Care instructions adapted under license by [address] If you have questions about a medical condition or this instruction, always ask your healthcare professional. [place] [address] any warranty or liability for your use of this information.

## 2020-11-06 NOTE — TELEPHONE ENCOUNTER
Copy of neuro consult faxed through [facility] to Dr. Esha Muñoz.   Electronically signed by Amadeo Fletcher on 11/6/20 at 3:23 PM EST

## 2020-11-09 ENCOUNTER — TELEPHONE (OUTPATIENT)
Dept: NEUROLOGY | Age: 65
End: 2020-11-09

## 2020-11-09 NOTE — TELEPHONE ENCOUNTER
Patient called in asking if her office note with [employer] [facility] is going to be loaded to her MyChart so she has access to it. She stated that she checked this morning and so far it is not there. Informed her that I would forward to Julius Yu to see if this is possible.

## 2020-11-13 ENCOUNTER — HOSPITAL ENCOUNTER (OUTPATIENT)
Dept: MAMMOGRAPHY | Age: 65
Discharge: HOME | End: 2020-11-15
Payer: MEDICARE

## 2020-11-13 PROCEDURE — 77063 BREAST TOMOSYNTHESIS BI: CPT

## 2021-02-12 ENCOUNTER — IMMUNIZATION (OUTPATIENT)
Dept: PRIMARY CARE CLINIC | Age: 66
End: 2021-02-12
Payer: MEDICARE

## 2021-02-12 PROCEDURE — 0011A COVID-19, MODERNA VACCINE 100MCG/0.5ML DOSE: CPT | Performed by: PHYSICIAN ASSISTANT

## 2021-02-12 PROCEDURE — 91301 COVID-19, MODERNA VACCINE 100MCG/0.5ML DOSE: CPT | Performed by: PHYSICIAN ASSISTANT

## 2021-03-15 ENCOUNTER — IMMUNIZATION (OUTPATIENT)
Dept: PRIMARY CARE CLINIC | Age: 66
End: 2021-03-15
Payer: MEDICARE

## 2021-03-15 PROCEDURE — 0012A COVID-19, MODERNA VACCINE 100MCG/0.5ML DOSE: CPT | Performed by: PHYSICIAN ASSISTANT

## 2021-03-15 PROCEDURE — 91301 COVID-19, MODERNA VACCINE 100MCG/0.5ML DOSE: CPT | Performed by: PHYSICIAN ASSISTANT

## 2021-04-09 ENCOUNTER — HOSPITAL ENCOUNTER (OUTPATIENT)
Age: 66
Discharge: HOME | End: 2021-04-09
Payer: MEDICARE

## 2021-04-09 LAB
ALBUMIN SERPL-MCNC: 4.6 G/DL (ref 3.5–5.2)
ALP BLD-CCNC: 58 U/L (ref 35–104)
ALT SERPL-CCNC: 16 U/L (ref 0–32)
ANION GAP SERPL CALCULATED.3IONS-SCNC: 11 MMOL/L (ref 7–16)
AST SERPL-CCNC: 27 U/L (ref 0–31)
BASOPHILS ABSOLUTE: 0.04 E9/L (ref 0–0.2)
BASOPHILS RELATIVE PERCENT: 0.7 % (ref 0–2)
BILIRUB SERPL-MCNC: 0.2 MG/DL (ref 0–1.2)
BUN BLDV-MCNC: 12 MG/DL (ref 8–23)
CALCIUM SERPL-MCNC: 9.6 MG/DL (ref 8.6–10.2)
CHLORIDE BLD-SCNC: 101 MMOL/L (ref 98–107)
CO2: 28 MMOL/L (ref 22–29)
CREAT SERPL-MCNC: 0.9 MG/DL (ref 0.5–1)
EOSINOPHILS ABSOLUTE: 0.18 E9/L (ref 0.05–0.5)
EOSINOPHILS RELATIVE PERCENT: 3.3 % (ref 0–6)
GFR AFRICAN AMERICAN: >60
GFR NON-AFRICAN AMERICAN: >60 ML/MIN/1.73
GLUCOSE BLD-MCNC: 156 MG/DL (ref 74–99)
HCT VFR BLD CALC: 39.3 % (ref 34–48)
HEMOGLOBIN: 12.9 G/DL (ref 11.5–15.5)
IMMATURE GRANULOCYTES #: 0.02 E9/L
IMMATURE GRANULOCYTES %: 0.4 % (ref 0–5)
LYMPHOCYTES ABSOLUTE: 2.13 E9/L (ref 1.5–4)
LYMPHOCYTES RELATIVE PERCENT: 38.9 % (ref 20–42)
MCH RBC QN AUTO: 32.2 PG (ref 26–35)
MCHC RBC AUTO-ENTMCNC: 32.8 % (ref 32–34.5)
MCV RBC AUTO: 98 FL (ref 80–99.9)
MONOCYTES ABSOLUTE: 0.55 E9/L (ref 0.1–0.95)
MONOCYTES RELATIVE PERCENT: 10.1 % (ref 2–12)
NEUTROPHILS ABSOLUTE: 2.55 E9/L (ref 1.8–7.3)
NEUTROPHILS RELATIVE PERCENT: 46.6 % (ref 43–80)
PDW BLD-RTO: 13.7 FL (ref 11.5–15)
PLATELET # BLD: 226 E9/L (ref 130–450)
PMV BLD AUTO: 9.3 FL (ref 7–12)
POTASSIUM SERPL-SCNC: 4.3 MMOL/L (ref 3.5–5)
RBC # BLD: 4.01 E12/L (ref 3.5–5.5)
SODIUM BLD-SCNC: 140 MMOL/L (ref 132–146)
TOTAL PROTEIN: 7.4 G/DL (ref 6.4–8.3)
WBC # BLD: 5.5 E9/L (ref 4.5–11.5)

## 2021-04-09 PROCEDURE — 85025 COMPLETE CBC W/AUTO DIFF WBC: CPT

## 2021-04-09 PROCEDURE — 80053 COMPREHEN METABOLIC PANEL: CPT

## 2021-04-09 PROCEDURE — 36415 COLL VENOUS BLD VENIPUNCTURE: CPT

## 2021-05-07 ENCOUNTER — HOSPITAL ENCOUNTER (OUTPATIENT)
Dept: MRI IMAGING | Age: 66
Discharge: HOME | End: 2021-05-09
Payer: MEDICARE

## 2021-05-07 DIAGNOSIS — M99.51 INTERVERTEBRAL DISC STENOSIS OF NEURAL CANAL OF CERVICAL REGION: ICD-10-CM

## 2021-05-07 PROCEDURE — 72141 MRI NECK SPINE W/O DYE: CPT

## 2021-05-14 ENCOUNTER — HOSPITAL ENCOUNTER (OUTPATIENT)
Dept: CT IMAGING | Age: 66
Discharge: HOME | End: 2021-05-14
Payer: MEDICARE

## 2021-05-14 ENCOUNTER — HOSPITAL ENCOUNTER (OUTPATIENT)
Age: 66
Discharge: HOME | End: 2021-05-14
Payer: MEDICARE

## 2021-05-14 DIAGNOSIS — R10.84 ABDOMINAL PAIN, GENERALIZED: ICD-10-CM

## 2021-05-14 LAB
BUN BLDV-MCNC: 10 MG/DL (ref 6–23)
CREAT SERPL-MCNC: 0.8 MG/DL (ref 0.5–1)
GFR AFRICAN AMERICAN: >60
GFR NON-AFRICAN AMERICAN: >60 ML/MIN/1.73

## 2021-05-14 PROCEDURE — 82565 ASSAY OF CREATININE: CPT

## 2021-05-14 PROCEDURE — 74178 CT ABD&PLV WO CNTR FLWD CNTR: CPT

## 2021-05-14 PROCEDURE — 84520 ASSAY OF UREA NITROGEN: CPT

## 2021-05-14 PROCEDURE — 36415 COLL VENOUS BLD VENIPUNCTURE: CPT

## 2021-05-14 PROCEDURE — 74177 CT ABD & PELVIS W/CONTRAST: CPT

## 2021-05-14 PROCEDURE — 6360000004 HC RX CONTRAST MEDICATION: Performed by: RADIOLOGY

## 2021-05-14 RX ADMIN — IOHEXOL 50 ML: 240 INJECTION, SOLUTION INTRATHECAL; INTRAVASCULAR; INTRAVENOUS; ORAL at 14:18

## 2021-05-14 RX ADMIN — IOPAMIDOL 75 ML: 755 INJECTION, SOLUTION INTRAVENOUS at 14:18

## 2021-07-22 ENCOUNTER — OFFICE VISIT (OUTPATIENT)
Dept: CARDIOLOGY CLINIC | Age: 66
End: 2021-07-22
Payer: MEDICARE

## 2021-07-22 VITALS
HEART RATE: 70 BPM | HEIGHT: 66 IN | DIASTOLIC BLOOD PRESSURE: 62 MMHG | WEIGHT: 154 LBS | BODY MASS INDEX: 24.75 KG/M2 | SYSTOLIC BLOOD PRESSURE: 120 MMHG

## 2021-07-22 DIAGNOSIS — M79.7 FIBROMYALGIA: ICD-10-CM

## 2021-07-22 DIAGNOSIS — M85.80 OSTEOPENIA, UNSPECIFIED LOCATION: ICD-10-CM

## 2021-07-22 DIAGNOSIS — G24.01 TARDIVE DYSKINESIA: ICD-10-CM

## 2021-07-22 DIAGNOSIS — M47.9 OSTEOARTHRITIS OF SPINE, UNSPECIFIED SPINAL OSTEOARTHRITIS COMPLICATION STATUS, UNSPECIFIED SPINAL REGION: ICD-10-CM

## 2021-07-22 DIAGNOSIS — Z85.3 HISTORY OF LEFT BREAST CANCER: ICD-10-CM

## 2021-07-22 DIAGNOSIS — K44.9 HIATAL HERNIA WITH GASTROESOPHAGEAL REFLUX: ICD-10-CM

## 2021-07-22 DIAGNOSIS — E11.8 DM (DIABETES MELLITUS), TYPE 2 WITH COMPLICATIONS (HCC): ICD-10-CM

## 2021-07-22 DIAGNOSIS — K58.9 IRRITABLE BOWEL SYNDROME, UNSPECIFIED TYPE: ICD-10-CM

## 2021-07-22 DIAGNOSIS — J45.20 MILD INTERMITTENT ASTHMA WITHOUT COMPLICATION: ICD-10-CM

## 2021-07-22 DIAGNOSIS — Z86.79 H/O PERICARDITIS: ICD-10-CM

## 2021-07-22 DIAGNOSIS — K21.9 HIATAL HERNIA WITH GASTROESOPHAGEAL REFLUX: ICD-10-CM

## 2021-07-22 DIAGNOSIS — E78.2 MIXED HYPERLIPIDEMIA: ICD-10-CM

## 2021-07-22 DIAGNOSIS — M19.011 ARTHROPATHY OF RIGHT SHOULDER: ICD-10-CM

## 2021-07-22 DIAGNOSIS — Z87.19 H/O DIVERTICULITIS OF COLON: ICD-10-CM

## 2021-07-22 DIAGNOSIS — F31.9 BIPOLAR DEPRESSION (HCC): ICD-10-CM

## 2021-07-22 DIAGNOSIS — I34.0 NONRHEUMATIC MITRAL VALVE REGURGITATION: Primary | ICD-10-CM

## 2021-07-22 DIAGNOSIS — F20.9 SCHIZOPHRENIA, UNSPECIFIED TYPE (HCC): ICD-10-CM

## 2021-07-22 DIAGNOSIS — K76.0 FATTY LIVER: ICD-10-CM

## 2021-07-22 PROCEDURE — 99213 OFFICE O/P EST LOW 20 MIN: CPT | Performed by: INTERNAL MEDICINE

## 2021-07-22 PROCEDURE — 93000 ELECTROCARDIOGRAM COMPLETE: CPT | Performed by: INTERNAL MEDICINE

## 2021-07-22 RX ORDER — OMEPRAZOLE 20 MG/1
20 CAPSULE, DELAYED RELEASE ORAL DAILY
COMMUNITY

## 2021-07-22 NOTE — PROGRESS NOTES
OFFICE VISIT        PRIMARY CARE PHYSICIAN:    [facility],        ALLERGIES / SENSITIVITIES:    Allergies   Allergen Reactions    Codeine Swelling     Pure codeine gets itching    Macrobid [Nitrofurantoin Monohydrate Macrocrystals] Rash    Pcn [Penicillins] Hives     On stomach and thighs      Neosporin [Neomycin-Polymyx-Gramicid] Dermatitis    Sulfa Antibiotics Dermatitis     Skin turns red      Ultram [Tramadol Hcl] Itching     & hallucinations          REVIEWED MEDICATIONS:      Current Outpatient Medications:     omeprazole (PRILOSEC) 20 MG delayed release capsule, Take 20 mg by mouth daily, Disp: , Rfl:     meclizine (ANTIVERT) 25 MG tablet, Take 1 tablet by mouth every 8 hours as needed, Disp: , Rfl:     loperamide (IMODIUM) 2 MG capsule, Take 1 mg by mouth Daily, Disp: , Rfl:     oxyCODONE-acetaminophen (PERCOCET) 5-325 MG per tablet, TAKE ONE TABLET BY MOUTH EVERY 8 HOURS AS NEEDED, Disp: , Rfl:     metFORMIN (GLUCOPHAGE-XR) 500 MG extended release tablet, TAKE TWO TABLETS BY MOUTH TWO TIMES A DAY, Disp: , Rfl:     buPROPion (WELLBUTRIN SR) 150 MG extended release tablet, TAKE 1 TABLET BY MOUTH ONCE DAILY, Disp: , Rfl: 0    famotidine (PEPCID) 40 MG tablet, TAKE ONE TABLET BY MOUTH EVERY DAY, Disp: , Rfl: 1    aspirin 81 MG tablet, Take 81 mg by mouth daily , Disp: , Rfl:     Dulaglutide (TRULICITY SC), Inject 0.22 mg into the skin every 7 days Q Tuesdays, Disp: , Rfl:     loxapine (LOXITANE) 10 MG capsule, Take 10 mg by mouth nightly , Disp: , Rfl:     B Complex-Biotin-FA (B-50 COMPLEX PO), Take by mouth 2 times daily, Disp: , Rfl:     ondansetron (ZOFRAN) 4 MG tablet, Take 4 mg by mouth every 8 hours as needed for Nausea or Vomiting, Disp: , Rfl:     propranolol (INDERAL) 20 MG tablet, Take 20 mg by mouth 2 times daily , Disp: , Rfl:     sucralfate (CARAFATE) 1 GM tablet, Take 1 g by mouth daily , Disp: , Rfl:     valACYclovir (VALTREX) 1 G tablet, Take 1,000 mg by mouth 2 times daily As needed, Disp: , Rfl:     montelukast (SINGULAIR) 10 MG tablet, Take 10 mg by mouth nightly., Disp: , Rfl:     simvastatin (ZOCOR) 20 MG tablet, Take 20 mg by mouth every morning (before breakfast) , Disp: , Rfl:     ascorbic acid (VITAMIN C) 500 MG tablet, Take 500 mg by mouth 2 times daily. , Disp: , Rfl:     Potassium 99 MG TABS, Take 1 capsule by mouth daily. , Disp: , Rfl:     CALCIUM-VITAMIN D PO, Take 1 tablet by mouth daily 1200 mg/ 25mg, Disp: , Rfl:     losartan (COZAAR) 25 MG tablet, Take 25 mg by mouth daily. , Disp: , Rfl:     Multiple Vitamin (MULTIVITAMIN PO), Take 1 tablet by mouth once., Disp: , Rfl:     fish oil-omega-3 fatty acids 1000 MG capsule, Take 1 g by mouth 3 times daily , Disp: , Rfl:     sertraline (ZOLOFT) 100 MG tablet, Take 100 mg by mouth nightly , Disp: , Rfl:       S: REASON FOR VISIT:    Mitral regurgitation (mild on echo in 9/20/20), history of pericarditis in 1991 and 1996 and risk factors for coronary artery disease. Royal Ca is a pleasant, 63-year-old lady with cardiovascular history as described below. She is limited in her activities as far as walking is concerned because of bilateral neuropathy in her feet. She does, however, go 3 times a week for exercise for 1 hour tuesdays, Thursday and Friday at the [facility], operated by [facility]. She denies chest pain or dyspnea with her daily activities. She denies orthopnea, PND's or lower extremity swelling. She denies palpitations, dizziness, presyncope or syncope. REVIEW OF SYSTEMS:    CONSTITUTIONAL:  Denies fevers, chills, night sweats or fatigue. HEENT:  Denies any unusual headaches.  Denies recent changes in hearing or vision.  Denies dysphagia, hoarseness, hemoptysis, hematemesis or epistaxis. ENDOCRINE:  Denies polyphagia, polydipsia or polyuria.  Denies heat or cold intolerance.   Ray Turner has pains in the lateral aspect of her feet that are attributed to diabetic peripheral neuropathy. SKIN:  Denies rashes, ulcers or itching. HEME/LYMPH:  Denies any palpable lymph nodes, bleeding or easy bruisability. HEART:  As above. LUNGS:  Denies any significant cough or sputum production. GI: she has epigastric discomfort.  Denies nausea, vomiting, diarrhea, constipation, rectal bleeding or tarry stools. :  Denies hematuria or dysuria. PSYCHIATRIC:  Has history of bipolar depression and schizophrenia but denies any recent mood changes or anxiety. NEUROLOGIC:  She has tardive dyskinesia.  Denies memory loss, motor weakness, numbness, tingling or tremors.                    CARDIOVASCULAR HISTORY:    1.  Reported vertebrobasilar transient ischemic attack in 05/2003 with no recurrence. 2.  Cardiac catheterization 11/27/2001.  Patent coronary arteries.  Normal LV function. 3.  Lexiscan nuclear stress test done 02/15/2014 was within normal limits with no evidence of scars or stress-induced ischemia with no regional wall motion abnormality and with a calculated ejection fraction of 68%. 4.  Echocardiogram done on 9/8/2020 showed normal left ventricular size and wall thickness with no gross regional wall motion abnormality with an ejection fraction of 60 +/- 5% with stage 1 left ventricular diastolic dysfunction, normal right ventricular size and function. Normal size left atrium. Mild mitral regurgitation. 5.  Hyperlipidemia. 6.  Diabetes mellitus with peripheral neuropathy. 7.  Pericarditis in [facility] 25:  1.  As under cardiovascular history. 2.  Arthritis of the spine. 3.  Asthma, history of bronchitis and history of pneumonia. 4.  Bipolar depression and schizophrenia. 5.  History of breast lumpectomies.  History of left breast cancer, status post lumpectomy and radiation therapy. 6.  History of diverticulitis. 7.  History of irritable bowel syndrome.   8.  History of hiatal hernia and GERD. Endoscopy in 2/2019 reportedly showed superficial ulcers.   9.  Fibromyalgia. 10.  History of herpes genitalia.    11.  Fatty liver. 12.  History of migraines. 13.  History of neuropathy of feet. 14.  Osteopenia. 15.  History of pancreatitis. 12.  History of abdominal surgery for removal of polyps and history of exploratory laparotomy for removal of scar tissue. 16.  History of appendectomy. 25.  History of cholecystectomy. 23.  History of hysterectomy. 20.  History of colon surgery. 21.  History of colonoscopy. 22.  History of cystoscopy. 23.  History of bilateral cataract removal with lens implants. 24.  History of Meckel's diverticulum. 25.  History of migraine headaches. 26.  History of ovarian surgery. 32.  History of excision of birthmark from face and arm. 28.  History of nerve block to the left ilioinguinal nerve. 29.  Tardive dyskinesia. 30. Right shoulder impingement syndrome and incomplete repair of right rotator cuff.  MRI done on 2/3/2020 (after falling, hitting right shoulder) showed tearing of the rotator cuff without evidence of a recurrent or complete tear and with post surgical changes.       FAMILY HISTORY:  Mother  at age 80, had bypass surgery at age 80/80. Father  of a stroke at age 80.     SOCIAL HISTORY:  Patient does not smoke.  She rarely drinks alcohol.       O:  COMPLETE PHYSICAL EXAM:      /62 (Site: Right Upper Arm, Position: Sitting, Cuff Size: Medium Adult)   Pulse 70   Ht 5' 6\" (1.676 m)   Wt 154 lb (69.9 kg)   BMI 24.86 kg/m²      General:          Well-developed, well-nourished lady in no acute distress. HEENT:           Normocephalic and atraumatic head. No JVD. No carotid bruits. Carotid upstrokes normal bilaterally.  No thyromegaly.  Sclerae not icteric.  No xanthelasmas.  Mucous membranes moist.  Chest:              Symmetrical and nontender.  No deformities. Lungs:             Clear to auscultation bilaterally.   Heart:              Normal S1 and S2.  No S3 or S4.  No murmurs or rubs.  Abdomen:        Soft, nontender without organomegaly or masses.  No bruits.  Normal bowel sounds. Extremities:     No edema.  Pulses normal.  Skin:                Normal turgor. No rashes or ulcers noted. Neurologic:      Oriented x3.  No motor or sensory deficits detected. REVIEW OF DIAGNOSTIC TESTS:    1. Lipid profile from 8/6/2020 reviewed: Total cholesterol 121, triglycerides 299, HDL 45, LDL 45.  2.  Blood tests from 4/9/2021 reviewed: In Epic:  Glucose 156. Otherwise, CMP within normal limits. CBC within normal limits. 3.  EKG done today showed sinus rhythm with low QRS voltages in the precordial leads, but was otherwise unremarkable. ASSESSMENT / DIAGNOSIS:   1.  Mild mitral regurgitation on echo in 9/2020. 2.  Diabetes mellitus with peripheral neuropathy. 3.  Hyperlipidemia. 4.  History of pericarditis.    5.  History of hiatal hernia and GERD. 6.  History of irritable bowel syndrome and history of diverticulitis.    7.  Fatty liver.    8.  Bipolar depression. 9.  Schizophrenia.    10. History of left breast cancer, S/P lumpectomy and radiation therapy.    11. Asthma with a history of bronchitis and pneumonia. 12. Arthritis of the spine. 13. Fibromyalgia. 14. Osteopenia.    15. Right shoulder impingement syndrome and partial rotator cuff tear.    16. Tardive dyskinesia.           TREATMENT PLAN:  1. Reassure. 2.  Continue current medications. 3.  Patient advised to remain active. 4.  Follow up with Cardiology in 1 year or on a prn basis. Alyssa TiradoEn[facility] [phone]

## 2021-10-11 ENCOUNTER — OFFICE VISIT (OUTPATIENT)
Dept: ONCOLOGY | Age: 66
End: 2021-10-11
Payer: MEDICARE

## 2021-10-11 ENCOUNTER — HOSPITAL ENCOUNTER (OUTPATIENT)
Dept: INFUSION THERAPY | Age: 66
Discharge: HOME | End: 2021-10-11
Payer: MEDICARE

## 2021-10-11 ENCOUNTER — TELEPHONE (OUTPATIENT)
Dept: INFUSION THERAPY | Age: 66
End: 2021-10-11

## 2021-10-11 VITALS
OXYGEN SATURATION: 96 % | SYSTOLIC BLOOD PRESSURE: 126 MMHG | DIASTOLIC BLOOD PRESSURE: 76 MMHG | BODY MASS INDEX: 24.66 KG/M2 | HEART RATE: 94 BPM | WEIGHT: 152.8 LBS | TEMPERATURE: 97.5 F

## 2021-10-11 DIAGNOSIS — C50.012 MALIGNANT NEOPLASM OF NIPPLE OF LEFT BREAST IN FEMALE, UNSPECIFIED ESTROGEN RECEPTOR STATUS (HCC): ICD-10-CM

## 2021-10-11 DIAGNOSIS — C50.411 MALIGNANT NEOPLASM OF UPPER-OUTER QUADRANT OF RIGHT BREAST IN FEMALE, ESTROGEN RECEPTOR POSITIVE (HCC): Primary | ICD-10-CM

## 2021-10-11 DIAGNOSIS — Z17.0 MALIGNANT NEOPLASM OF UPPER-OUTER QUADRANT OF RIGHT BREAST IN FEMALE, ESTROGEN RECEPTOR POSITIVE (HCC): Primary | ICD-10-CM

## 2021-10-11 LAB
ALBUMIN SERPL-MCNC: 4.7 G/DL (ref 3.5–5.2)
ALP BLD-CCNC: 72 U/L (ref 35–104)
ALT SERPL-CCNC: 26 U/L (ref 0–32)
ANION GAP SERPL CALCULATED.3IONS-SCNC: 51 MMOL/L (ref 7–16)
AST SERPL-CCNC: 39 U/L (ref 0–31)
BASOPHILS ABSOLUTE: 0.05 E9/L (ref 0–0.2)
BASOPHILS RELATIVE PERCENT: 0.6 % (ref 0–2)
BILIRUB SERPL-MCNC: 0.2 MG/DL (ref 0–1.2)
BUN BLDV-MCNC: 12 MG/DL (ref 6–23)
CALCIUM SERPL-MCNC: 10.3 MG/DL (ref 8.6–10.2)
CHLORIDE BLD-SCNC: 97 MMOL/L (ref 98–107)
CO2: 26 MMOL/L (ref 22–29)
CREAT SERPL-MCNC: 0.8 MG/DL (ref 0.5–1)
EOSINOPHILS ABSOLUTE: 0.6 E9/L (ref 0.05–0.5)
EOSINOPHILS RELATIVE PERCENT: 7.5 % (ref 0–6)
GFR AFRICAN AMERICAN: >60
GFR NON-AFRICAN AMERICAN: >60 ML/MIN/1.73
GLUCOSE BLD-MCNC: 136 MG/DL (ref 74–99)
HCT VFR BLD CALC: 39.3 % (ref 34–48)
HEMOGLOBIN: 13.1 G/DL (ref 11.5–15.5)
IMMATURE GRANULOCYTES #: 0.02 E9/L
IMMATURE GRANULOCYTES %: 0.2 % (ref 0–5)
LYMPHOCYTES ABSOLUTE: 1.78 E9/L (ref 1.5–4)
LYMPHOCYTES RELATIVE PERCENT: 22.1 % (ref 20–42)
MCH RBC QN AUTO: 31.5 PG (ref 26–35)
MCHC RBC AUTO-ENTMCNC: 33.3 % (ref 32–34.5)
MCV RBC AUTO: 94.5 FL (ref 80–99.9)
MONOCYTES ABSOLUTE: 0.97 E9/L (ref 0.1–0.95)
MONOCYTES RELATIVE PERCENT: 12 % (ref 2–12)
NEUTROPHILS ABSOLUTE: 4.63 E9/L (ref 1.8–7.3)
NEUTROPHILS RELATIVE PERCENT: 57.6 % (ref 43–80)
PDW BLD-RTO: 13.8 FL (ref 11.5–15)
PLATELET # BLD: 275 E9/L (ref 130–450)
PMV BLD AUTO: 9.2 FL (ref 7–12)
POTASSIUM SERPL-SCNC: 4.1 MMOL/L (ref 3.5–5)
RBC # BLD: 4.16 E12/L (ref 3.5–5.5)
SODIUM BLD-SCNC: 174 MMOL/L (ref 132–146)
TOTAL PROTEIN: 8 G/DL (ref 6.4–8.3)
WBC # BLD: 8.1 E9/L (ref 4.5–11.5)

## 2021-10-11 PROCEDURE — 85025 COMPLETE CBC W/AUTO DIFF WBC: CPT

## 2021-10-11 PROCEDURE — 99212 OFFICE O/P EST SF 10 MIN: CPT

## 2021-10-11 PROCEDURE — 80053 COMPREHEN METABOLIC PANEL: CPT

## 2021-10-11 PROCEDURE — 36415 COLL VENOUS BLD VENIPUNCTURE: CPT

## 2021-10-11 NOTE — PROGRESS NOTES
[address] [facility]. [facility]        Pt Name: Carissa De Dios: 1955  Date of evaluation: 10/11/2021  Primary Care Physician: Jena Koroma DO  Reason for evaluation:   No chief complaint on file. Subjective:  Here for yearly follow-up. Feels better than her hemoglobin is improved to the normal range. Was C/O dizziness and giddiness,headaches . MRI Brain was ordered by PCP. It was negative except for mild sphenoid sinusitis. C/O right shoulder pain. Received cortisone injection right shoulder by Dr. Letitia Reyna on 3/18/19. It helped her pain temporarily. S/P right shoulder arthroscopy with subacromial arch decompression,  Labral debridement, Debridement of partial thickness rotator cuff tear, and  biceps tenotomy on 8/30/2019 by Dr. Steph Quintanilla. OBJECTIVE:  VITALS:  weight is 152 lb 12.8 oz (69.3 kg). Her temperature is 97.5 °F (36.4 °C). Her blood pressure is 126/76 and her pulse is 94. Her oxygen saturation is 96%. Physical Exam:  Performance Status: 0  Well developed, well nourished female  EYES: sclera non-icteric   ENT: oropharynx clear. NECK: No lymphadenopathy. Mild soft tissue swelling in the right neck without palpable lymphadenopathy. BREASTS :Firmness and tenderness along lump in left breast with a small 0.5-1 cm nodule felt just inferior to scar. HEART: Regular rate and rhythm. LUNGS: Clear. ABDOMEN: Soft, non-tender. No mass or organomegaly. EXTREMITIES: without edema. NEUROLOGIC: No focal deficits. Medications  Prior to Admission medications    Medication Sig Start Date End Date Taking?  Authorizing Provider   omeprazole (PRILOSEC) 20 MG delayed release capsule Take 20 mg by mouth daily    Historical Provider, MD   meclizine (ANTIVERT) 25 MG tablet Take 1 tablet by mouth every 8 hours as needed 10/8/20   Historical Provider, MD   loperamide (IMODIUM) 2 MG capsule Take 1 mg by mouth Daily    Historical Provider, once.    Historical Provider, MD   fish oil-omega-3 fatty acids 1000 MG capsule Take 1 g by mouth 3 times daily     Historical Provider, MD   sertraline (ZOLOFT) 100 MG tablet Take 100 mg by mouth nightly     Historical Provider, MD    Scheduled Meds:  Continuous Infusions:  PRN Meds:.        Recent Laboratory Data-     Lab Results   Component Value Date    WBC 5.5 04/09/2021    HGB 12.9 04/09/2021    HCT 39.3 04/09/2021    MCV 98.0 04/09/2021     04/09/2021    LYMPHOPCT 38.9 04/09/2021    RBC 4.01 04/09/2021    MCH 32.2 04/09/2021    MCHC 32.8 04/09/2021    RDW 13.7 04/09/2021    NEUTOPHILPCT 46.6 04/09/2021    MONOPCT 10.1 04/09/2021    BASOPCT 0.7 04/09/2021    NEUTROABS 2.55 04/09/2021    LYMPHSABS 2.13 04/09/2021    MONOSABS 0.55 04/09/2021    EOSABS 0.18 04/09/2021    BASOSABS 0.04 04/09/2021       Lab Results   Component Value Date     04/09/2021    K 4.3 04/09/2021     04/09/2021    CO2 28 04/09/2021    BUN 10 05/14/2021    CREATININE 0.8 05/14/2021    GLUCOSE 156 (H) 04/09/2021    CALCIUM 9.6 04/09/2021    PROT 7.4 04/09/2021    LABALBU 4.6 04/09/2021    BILITOT 0.2 04/09/2021    ALKPHOS 58 04/09/2021    AST 27 04/09/2021    ALT 16 04/09/2021    LABGLOM >60 05/14/2021    GFRAA >60 05/14/2021         Lab Results   Component Value Date    IRON 75 07/30/2020    TIBC 336 07/30/2020    FERRITIN 46 07/30/2020          Ref. Range 3/17/2014 11:39 10/27/2014 11:18 4/27/2015 14:48 11/4/2015 15:08 5/4/2016 13:19   CA 15-3 Latest Ref Range: 0 - 31 U/mL 10 10 9 9 10           Radiology-  MRI BRAIN:  10/08/2020  No acute intracranial abnormality.         New mild chronic microvascular disease within the periventricular white    matter.         Moderate mucosal thickening within the left sphenoid sinus. US CAROTID ARTERY BILATERAL:  10/05/2020.   There is no measurable stenosis of the right or left carotid arteries.  No    plaque is appreciated.         Bilateral vertebral arteries are patent with flow in the normal direction.               SCREENING MAMMOGRAM BILATERAL:  11/06/2019  1. Stable mammogram without evidence of malignancy.           Recommendation:      1. Annual screening mammography is recommended.              BI-RADS Category:  1 - Negative              CT ABDOMEN AND PELVIS:  2/6/19  1. Tiny focus of air within the urinary bladder, possibly related to   recent instrumentation or fistula. A small amount of air has been   noted within the bladder dating back to CTs from 9/28/2014. There is   no definite focal bladder wall thickening. 2. Bilateral kidneys are normal in morphology and demonstrate   symmetric enhancement. ASSESSMENT/PLAN :  Alex Burgos woman with DCIS involving the left breast, diagnosed in Nov 2010,status post excision with clear margins. She completed radiation to the left breast . Pt had positive ER and AZ receptors. She was not a good candidate for Tamoxifen in the preventive setting because of severe bipolar disorder,and the fact that she has had in the past TIA and has been on aspirin and Persantine. So in order to prevent potential for exacerbation of depression or thromboembolic events, patient was simply recommended surveillance and she continues to do well at this time without any evidence of disease recurrence. She had follow up mammogram in OCT 2018 which was negative      2- Anemia By Hx diagnosed in [address] [facility] [address] 2013 likely related to post operative state with operative blood losses ,iron deficiency and myelosuppression by cellulitis and abdominal wall infection . She also had NSAID induced gastritis . She has been intolerant of oral Iron supplements with dyspepsia and constipation and was recommended parenteral IV iron . She responded well with normalization of her Hb and iron levels. To continue Protonix and Zantac. Had follow up EGD with Dr Brenda Archer in MAY with findings of healed ulcers .   Her repeat CBC showed recurrent anemia with a hemoglobin of 11 and her iron studies are consistent with iron deficiency anemia. The fact that she is on Zantac and Pantoprazole with lack of stomach acidity, her oral iron absorption is impaired and she will be recommended parenteral IV iron with Feraheme.    Her anemia is likely related to GI blood losses from gastritis as well as colitis. Her last colonoscopy in 2016 had shown only colitis  She responded well to parenteral IV iron and her hemoglobin is up to normal range 12.1 on 5/16/18  It is slightly down to 11.1 In December 2018  She responded well to parenteral IV iron And her hemoglobin today is up to 13.3     3- Abdominal wall cellulitis resolved .      4-Mildly elevated transaminases chronic in nature and attributed to fatty liver secondary to diabetes  Most recent hepatic panel is back to normal range      Ultrasound of thyroid and neck was entirely negative   She has been diagnosed on CT scan with small amount of air in her urinary bladder with suspicion of a fistula  and she had a cystoscopy 2/25/19. Erin Patrick 5-Recent headaches and dizziness. MRI of brain negative except for sphenoid sinusitis. She has been feeling better and her headaches resolved after treatment of her sinusitis    The patient had continued to complain of neck pain shoulder pain and occipital headaches and had an MRI of the C-spine which showed multilevel disc disease and she underwent PT. She has completed Covid vaccines    She will have follow-up and mammogram in November 2021  Continue surveillance on a yearly basis.         Shanice Stephen. Edmon Dakins, M.D., F.A.C.P.   Electronically signed 10/11/2021 at 2:30 PM

## 2021-10-12 ENCOUNTER — HOSPITAL ENCOUNTER (OUTPATIENT)
Dept: INFUSION THERAPY | Age: 66
Discharge: HOME | End: 2021-10-12
Payer: MEDICARE

## 2021-10-12 DIAGNOSIS — C50.411 MALIGNANT NEOPLASM OF UPPER-OUTER QUADRANT OF RIGHT BREAST IN FEMALE, ESTROGEN RECEPTOR POSITIVE (HCC): ICD-10-CM

## 2021-10-12 DIAGNOSIS — Z17.0 MALIGNANT NEOPLASM OF UPPER-OUTER QUADRANT OF RIGHT BREAST IN FEMALE, ESTROGEN RECEPTOR POSITIVE (HCC): ICD-10-CM

## 2021-10-12 LAB
ALBUMIN SERPL-MCNC: 4.5 G/DL (ref 3.5–5.2)
ALP BLD-CCNC: 69 U/L (ref 35–104)
ALT SERPL-CCNC: 26 U/L (ref 0–32)
ANION GAP SERPL CALCULATED.3IONS-SCNC: 12 MMOL/L (ref 7–16)
AST SERPL-CCNC: 37 U/L (ref 0–31)
BILIRUB SERPL-MCNC: 0.2 MG/DL (ref 0–1.2)
BUN BLDV-MCNC: 12 MG/DL (ref 6–23)
CALCIUM SERPL-MCNC: 10.1 MG/DL (ref 8.6–10.2)
CHLORIDE BLD-SCNC: 102 MMOL/L (ref 98–107)
CO2: 26 MMOL/L (ref 22–29)
CREAT SERPL-MCNC: 0.8 MG/DL (ref 0.5–1)
GFR AFRICAN AMERICAN: >60
GFR NON-AFRICAN AMERICAN: >60 ML/MIN/1.73
GLUCOSE BLD-MCNC: 150 MG/DL (ref 74–99)
POTASSIUM SERPL-SCNC: 4.5 MMOL/L (ref 3.5–5)
SODIUM BLD-SCNC: 140 MMOL/L (ref 132–146)
TOTAL PROTEIN: 7.6 G/DL (ref 6.4–8.3)

## 2021-10-12 PROCEDURE — 36415 COLL VENOUS BLD VENIPUNCTURE: CPT

## 2021-10-12 PROCEDURE — 80053 COMPREHEN METABOLIC PANEL: CPT

## 2021-10-13 DIAGNOSIS — Z17.0 MALIGNANT NEOPLASM OF UPPER-OUTER QUADRANT OF RIGHT BREAST IN FEMALE, ESTROGEN RECEPTOR POSITIVE (HCC): ICD-10-CM

## 2021-10-13 DIAGNOSIS — Z12.31 SCREENING MAMMOGRAM FOR HIGH-RISK PATIENT: Primary | ICD-10-CM

## 2021-10-13 DIAGNOSIS — C50.411 MALIGNANT NEOPLASM OF UPPER-OUTER QUADRANT OF RIGHT BREAST IN FEMALE, ESTROGEN RECEPTOR POSITIVE (HCC): ICD-10-CM

## 2021-10-19 DIAGNOSIS — Z12.31 SCREENING MAMMOGRAM FOR HIGH-RISK PATIENT: Primary | ICD-10-CM

## 2021-10-19 DIAGNOSIS — Z17.0 MALIGNANT NEOPLASM OF UPPER-OUTER QUADRANT OF RIGHT BREAST IN FEMALE, ESTROGEN RECEPTOR POSITIVE (HCC): ICD-10-CM

## 2021-10-19 DIAGNOSIS — C50.411 MALIGNANT NEOPLASM OF UPPER-OUTER QUADRANT OF RIGHT BREAST IN FEMALE, ESTROGEN RECEPTOR POSITIVE (HCC): ICD-10-CM

## 2021-10-25 DIAGNOSIS — Z12.31 SCREENING MAMMOGRAM FOR HIGH-RISK PATIENT: Primary | ICD-10-CM

## 2021-10-25 DIAGNOSIS — C50.012 MALIGNANT NEOPLASM OF NIPPLE OF LEFT BREAST IN FEMALE, UNSPECIFIED ESTROGEN RECEPTOR STATUS (HCC): ICD-10-CM

## 2021-11-15 ENCOUNTER — HOSPITAL ENCOUNTER (OUTPATIENT)
Dept: MAMMOGRAPHY | Age: 66
Discharge: HOME | End: 2021-11-17
Payer: MEDICARE

## 2021-11-15 DIAGNOSIS — Z12.31 SCREENING MAMMOGRAM FOR HIGH-RISK PATIENT: ICD-10-CM

## 2021-11-15 DIAGNOSIS — C50.411 MALIGNANT NEOPLASM OF UPPER-OUTER QUADRANT OF RIGHT BREAST IN FEMALE, ESTROGEN RECEPTOR POSITIVE (HCC): ICD-10-CM

## 2021-11-15 DIAGNOSIS — Z17.0 MALIGNANT NEOPLASM OF UPPER-OUTER QUADRANT OF RIGHT BREAST IN FEMALE, ESTROGEN RECEPTOR POSITIVE (HCC): ICD-10-CM

## 2021-11-15 PROCEDURE — 77063 BREAST TOMOSYNTHESIS BI: CPT

## 2022-03-23 DIAGNOSIS — Z17.0 MALIGNANT NEOPLASM OF UPPER-OUTER QUADRANT OF RIGHT BREAST IN FEMALE, ESTROGEN RECEPTOR POSITIVE (HCC): Primary | ICD-10-CM

## 2022-03-23 DIAGNOSIS — C50.411 MALIGNANT NEOPLASM OF UPPER-OUTER QUADRANT OF RIGHT BREAST IN FEMALE, ESTROGEN RECEPTOR POSITIVE (HCC): Primary | ICD-10-CM

## 2022-03-24 ENCOUNTER — HOSPITAL ENCOUNTER (OUTPATIENT)
Dept: INFUSION THERAPY | Age: 67
Discharge: HOME | End: 2022-03-24
Payer: MEDICARE

## 2022-03-24 DIAGNOSIS — C50.411 MALIGNANT NEOPLASM OF UPPER-OUTER QUADRANT OF RIGHT BREAST IN FEMALE, ESTROGEN RECEPTOR POSITIVE (HCC): ICD-10-CM

## 2022-03-24 DIAGNOSIS — Z17.0 MALIGNANT NEOPLASM OF UPPER-OUTER QUADRANT OF RIGHT BREAST IN FEMALE, ESTROGEN RECEPTOR POSITIVE (HCC): ICD-10-CM

## 2022-03-24 LAB
ALBUMIN SERPL-MCNC: 4.3 G/DL (ref 3.5–5.2)
ALP BLD-CCNC: 61 U/L (ref 35–104)
ALT SERPL-CCNC: 21 U/L (ref 0–32)
ANION GAP SERPL CALCULATED.3IONS-SCNC: 15 MMOL/L (ref 7–16)
AST SERPL-CCNC: 30 U/L (ref 0–31)
BASOPHILS ABSOLUTE: 0.05 E9/L (ref 0–0.2)
BASOPHILS RELATIVE PERCENT: 0.9 % (ref 0–2)
BILIRUB SERPL-MCNC: <0.2 MG/DL (ref 0–1.2)
BUN BLDV-MCNC: 8 MG/DL (ref 6–23)
CALCIUM SERPL-MCNC: 9.8 MG/DL (ref 8.6–10.2)
CHLORIDE BLD-SCNC: 101 MMOL/L (ref 98–107)
CO2: 22 MMOL/L (ref 22–29)
CREAT SERPL-MCNC: 0.7 MG/DL (ref 0.5–1)
EOSINOPHILS ABSOLUTE: 0.23 E9/L (ref 0.05–0.5)
EOSINOPHILS RELATIVE PERCENT: 4 % (ref 0–6)
FERRITIN: 32 NG/ML
GFR AFRICAN AMERICAN: >60
GFR NON-AFRICAN AMERICAN: >60 ML/MIN/1.73
GLUCOSE BLD-MCNC: 194 MG/DL (ref 74–99)
HCT VFR BLD CALC: 40.5 % (ref 34–48)
HEMOGLOBIN: 13 G/DL (ref 11.5–15.5)
IMMATURE GRANULOCYTES #: 0.01 E9/L
IMMATURE GRANULOCYTES %: 0.2 % (ref 0–5)
IRON SATURATION: 14 % (ref 15–50)
IRON: 48 MCG/DL (ref 37–145)
LYMPHOCYTES ABSOLUTE: 1.79 E9/L (ref 1.5–4)
LYMPHOCYTES RELATIVE PERCENT: 31.4 % (ref 20–42)
MCH RBC QN AUTO: 31.6 PG (ref 26–35)
MCHC RBC AUTO-ENTMCNC: 32.1 % (ref 32–34.5)
MCV RBC AUTO: 98.5 FL (ref 80–99.9)
MONOCYTES ABSOLUTE: 0.45 E9/L (ref 0.1–0.95)
MONOCYTES RELATIVE PERCENT: 7.9 % (ref 2–12)
NEUTROPHILS ABSOLUTE: 3.17 E9/L (ref 1.8–7.3)
NEUTROPHILS RELATIVE PERCENT: 55.6 % (ref 43–80)
PDW BLD-RTO: 14.1 FL (ref 11.5–15)
PLATELET # BLD: 261 E9/L (ref 130–450)
PMV BLD AUTO: 9.6 FL (ref 7–12)
POTASSIUM SERPL-SCNC: 4.6 MMOL/L (ref 3.5–5)
RBC # BLD: 4.11 E12/L (ref 3.5–5.5)
SODIUM BLD-SCNC: 138 MMOL/L (ref 132–146)
TOTAL IRON BINDING CAPACITY: 342 MCG/DL (ref 250–450)
TOTAL PROTEIN: 7.6 G/DL (ref 6.4–8.3)
WBC # BLD: 5.7 E9/L (ref 4.5–11.5)

## 2022-03-24 PROCEDURE — 82728 ASSAY OF FERRITIN: CPT

## 2022-03-24 PROCEDURE — 83550 IRON BINDING TEST: CPT

## 2022-03-24 PROCEDURE — 80053 COMPREHEN METABOLIC PANEL: CPT

## 2022-03-24 PROCEDURE — 83540 ASSAY OF IRON: CPT

## 2022-03-24 PROCEDURE — 85025 COMPLETE CBC W/AUTO DIFF WBC: CPT

## 2022-03-24 PROCEDURE — 36415 COLL VENOUS BLD VENIPUNCTURE: CPT

## 2022-03-28 ENCOUNTER — TELEPHONE (OUTPATIENT)
Dept: INFUSION THERAPY | Age: 67
End: 2022-03-28

## 2022-03-28 NOTE — TELEPHONE ENCOUNTER
Notified patient, that per Dr. Latia Jefferson, \"Her blood work is ok, please have her contact her family doctor regarding the fatigue and weakness that she has been having. \" Patient voiced understanding and will call her family doctor.

## 2022-04-12 ENCOUNTER — OFFICE VISIT (OUTPATIENT)
Dept: CARDIOLOGY CLINIC | Age: 67
End: 2022-04-12
Payer: MEDICARE

## 2022-04-12 ENCOUNTER — HOSPITAL ENCOUNTER (OUTPATIENT)
Age: 67
Discharge: HOME | End: 2022-04-12
Payer: MEDICARE

## 2022-04-12 VITALS
HEART RATE: 68 BPM | RESPIRATION RATE: 16 BRPM | WEIGHT: 152 LBS | HEIGHT: 66 IN | BODY MASS INDEX: 24.43 KG/M2 | SYSTOLIC BLOOD PRESSURE: 112 MMHG | DIASTOLIC BLOOD PRESSURE: 60 MMHG

## 2022-04-12 DIAGNOSIS — G24.01 TARDIVE DYSKINESIA: ICD-10-CM

## 2022-04-12 DIAGNOSIS — M19.011 ARTHROPATHY OF RIGHT SHOULDER: ICD-10-CM

## 2022-04-12 DIAGNOSIS — R06.09 DOE (DYSPNEA ON EXERTION): ICD-10-CM

## 2022-04-12 DIAGNOSIS — M47.9 OSTEOARTHRITIS OF SPINE, UNSPECIFIED SPINAL OSTEOARTHRITIS COMPLICATION STATUS, UNSPECIFIED SPINAL REGION: ICD-10-CM

## 2022-04-12 DIAGNOSIS — Z85.3 HISTORY OF LEFT BREAST CANCER: ICD-10-CM

## 2022-04-12 DIAGNOSIS — M79.7 FIBROMYALGIA: ICD-10-CM

## 2022-04-12 DIAGNOSIS — E11.8 DM (DIABETES MELLITUS), TYPE 2 WITH COMPLICATIONS (HCC): ICD-10-CM

## 2022-04-12 DIAGNOSIS — F31.9 BIPOLAR DEPRESSION (HCC): ICD-10-CM

## 2022-04-12 DIAGNOSIS — K76.0 FATTY LIVER: ICD-10-CM

## 2022-04-12 DIAGNOSIS — K21.9 HIATAL HERNIA WITH GASTROESOPHAGEAL REFLUX: ICD-10-CM

## 2022-04-12 DIAGNOSIS — I34.0 NONRHEUMATIC MITRAL VALVE REGURGITATION: ICD-10-CM

## 2022-04-12 DIAGNOSIS — K44.9 HIATAL HERNIA WITH GASTROESOPHAGEAL REFLUX: ICD-10-CM

## 2022-04-12 DIAGNOSIS — Z87.19 H/O DIVERTICULITIS OF COLON: ICD-10-CM

## 2022-04-12 DIAGNOSIS — R07.89 CHEST TIGHTNESS: ICD-10-CM

## 2022-04-12 DIAGNOSIS — K58.9 IRRITABLE BOWEL SYNDROME, UNSPECIFIED TYPE: ICD-10-CM

## 2022-04-12 DIAGNOSIS — F20.9 SCHIZOPHRENIA, UNSPECIFIED TYPE (HCC): ICD-10-CM

## 2022-04-12 DIAGNOSIS — E78.2 MIXED HYPERLIPIDEMIA: ICD-10-CM

## 2022-04-12 DIAGNOSIS — J45.20 MILD INTERMITTENT ASTHMA WITHOUT COMPLICATION: ICD-10-CM

## 2022-04-12 DIAGNOSIS — R06.09 DOE (DYSPNEA ON EXERTION): Primary | ICD-10-CM

## 2022-04-12 DIAGNOSIS — M85.80 OSTEOPENIA, UNSPECIFIED LOCATION: ICD-10-CM

## 2022-04-12 DIAGNOSIS — Z86.79 H/O PERICARDITIS: ICD-10-CM

## 2022-04-12 LAB — PRO-BNP: 97 PG/ML (ref 0–125)

## 2022-04-12 PROCEDURE — 93000 ELECTROCARDIOGRAM COMPLETE: CPT | Performed by: INTERNAL MEDICINE

## 2022-04-12 PROCEDURE — 36415 COLL VENOUS BLD VENIPUNCTURE: CPT

## 2022-04-12 PROCEDURE — 83880 ASSAY OF NATRIURETIC PEPTIDE: CPT

## 2022-04-12 PROCEDURE — 99214 OFFICE O/P EST MOD 30 MIN: CPT | Performed by: INTERNAL MEDICINE

## 2022-04-12 RX ORDER — DIPHENOXYLATE HYDROCHLORIDE AND ATROPINE SULFATE 2.5; .025 MG/1; MG/1
1 TABLET ORAL 4 TIMES DAILY PRN
COMMUNITY

## 2022-04-12 RX ORDER — METHOCARBAMOL 750 MG/1
TABLET, FILM COATED ORAL
COMMUNITY
Start: 2022-02-28

## 2022-04-12 NOTE — PROGRESS NOTES
OFFICE VISIT        PRIMARY CARE PHYSICIAN:    [facility],        ALLERGIES / SENSITIVITIES:    Allergies   Allergen Reactions    Codeine Swelling     Pure codeine gets itching    Macrobid [Nitrofurantoin Monohydrate Macrocrystals] Rash    Pcn [Penicillins] Hives     On stomach and thighs      Neosporin [Neomycin-Polymyx-Gramicid] Dermatitis    Sulfa Antibiotics Dermatitis     Skin turns red      Ultram [Tramadol Hcl] Itching     & hallucinations            REVIEWED MEDICATIONS:      Current Outpatient Medications:     methocarbamol (ROBAXIN) 750 MG tablet, take 1 tablet by mouth every 12 hours as needed, Disp: , Rfl:     Ubrogepant (UBRELVY) 100 MG TABS, Take by mouth, Disp: , Rfl:     diphenoxylate-atropine (LOMOTIL) 2.5-0.025 MG per tablet, Take 1 tablet by mouth 4 times daily as needed for Diarrhea., Disp: , Rfl:     omeprazole (PRILOSEC) 20 MG delayed release capsule, Take 20 mg by mouth daily, Disp: , Rfl:     meclizine (ANTIVERT) 25 MG tablet, Take 1 tablet by mouth every 8 hours as needed, Disp: , Rfl:     oxyCODONE-acetaminophen (PERCOCET) 5-325 MG per tablet, TAKE ONE TABLET BY MOUTH EVERY 8 HOURS AS NEEDED, Disp: , Rfl:     metFORMIN (GLUCOPHAGE-XR) 500 MG extended release tablet, TAKE TWO TABLETS BY MOUTH TWO TIMES A DAY, Disp: , Rfl:     buPROPion (WELLBUTRIN SR) 150 MG extended release tablet, TAKE 1 TABLET BY MOUTH ONCE DAILY, Disp: , Rfl: 0    famotidine (PEPCID) 40 MG tablet, TAKE ONE TABLET BY MOUTH EVERY DAY, Disp: , Rfl: 1    aspirin 81 MG tablet, Take 81 mg by mouth daily , Disp: , Rfl:     Dulaglutide (TRULICITY SC), Inject 1.96 mg into the skin every 7 days Q Tuesdays, Disp: , Rfl:     loxapine (LOXITANE) 10 MG capsule, Take 10 mg by mouth nightly , Disp: , Rfl:     B Complex-Biotin-FA (B-50 COMPLEX PO), Take by mouth 2 times daily, Disp: , Rfl:     ondansetron (ZOFRAN) 4 MG tablet, Take 4 mg by mouth every 8 hours as needed for Nausea or Vomiting, Disp: , Rfl:    propranolol (INDERAL) 20 MG tablet, Take 20 mg by mouth 2 times daily , Disp: , Rfl:     sucralfate (CARAFATE) 1 GM tablet, Take 1 g by mouth daily , Disp: , Rfl:     valACYclovir (VALTREX) 1 G tablet, Take 500 mg by mouth daily , Disp: , Rfl:     montelukast (SINGULAIR) 10 MG tablet, Take 10 mg by mouth nightly., Disp: , Rfl:     simvastatin (ZOCOR) 20 MG tablet, Take 20 mg by mouth every morning (before breakfast) , Disp: , Rfl:     ascorbic acid (VITAMIN C) 500 MG tablet, Take 500 mg by mouth 2 times daily. , Disp: , Rfl:     Potassium 99 MG TABS, Take 1 capsule by mouth daily. , Disp: , Rfl:     CALCIUM-VITAMIN D PO, Take 1 tablet by mouth daily 1200 mg/ 25mg, Disp: , Rfl:     losartan (COZAAR) 25 MG tablet, Take 25 mg by mouth daily. , Disp: , Rfl:     Multiple Vitamin (MULTIVITAMIN PO), Take 1 tablet by mouth once., Disp: , Rfl:     fish oil-omega-3 fatty acids 1000 MG capsule, Take 1 g by mouth 3 times daily , Disp: , Rfl:     sertraline (ZOLOFT) 100 MG tablet, Take 100 mg by mouth nightly , Disp: , Rfl:       S: REASON FOR VISIT:    Exertional dyspnea. Taya Baeza is a pleasant, 70-year-old lady with history of mild mitral regurgitation on echo in 9/2020. She has a history of pericarditis and risk factors for coronary artery disease. She was last seen in the office in 7/2021. She reports that over 3 weeks ago, she started to complain of shortness of breath with activity. She exercises 3 times a week, walking a track for 20 minutes. She noticed that if she walks fast, she gets short of breath. She also reports shortness of breath if she is going up more than 12 steps. In addition, she has been reporting chest tightness. She denies orthopnea, PND's, lower extremity swelling, palpitations, dizziness, presyncope or syncope. She tells me that her PCP did an echocardiogram on her and a chest x-ray, the results of which are not available and to be obtained.          REVIEW OF SYSTEMS: CONSTITUTIONAL:  Denies fevers, chills, night sweats or fatigue. HEENT:  Denies any unusual headaches.  Denies recent changes in hearing or vision.  Denies dysphagia, hoarseness, hemoptysis, hematemesis or epistaxis. ENDOCRINE:  Denies polyphagia, polydipsia or polyuria.  Denies heat or cold intolerance. Ej Beck has pains in the lateral aspect of her feet that are attributed to diabetic peripheral neuropathy. SKIN:  Denies rashes, ulcers or itching. HEME/LYMPH:  Denies any palpable lymph nodes, bleeding or easy bruisability. HEART:  As above. LUNGS:  Denies any significant cough or sputum production. GI: she has epigastric discomfort.  Denies nausea, vomiting, diarrhea, constipation, rectal bleeding or tarry stools. :  Denies hematuria or dysuria. PSYCHIATRIC:  Has history of bipolar depression and schizophrenia but denies any recent mood changes or anxiety. NEUROLOGIC:  She has tardive dyskinesia.  Denies memory loss, motor weakness, numbness, tingling or tremors.                    CARDIOVASCULAR HISTORY:    1.  Reported vertebrobasilar transient ischemic attack in 05/2003 with no recurrence. 2.  Cardiac catheterization 11/27/2001.  Patent coronary arteries.  Normal LV function. 3.  Lexiscan nuclear stress test done 02/15/2014 was within normal limits with no evidence of scars or stress-induced ischemia with no regional wall motion abnormality and with a calculated ejection fraction of 68%. 4.  Echocardiogram done on 9/8/2020 showed normal left ventricular size and wall thickness with no gross regional wall motion abnormality with an ejection fraction of 60 +/- 5% with stage 1 left ventricular diastolic dysfunction, normal right ventricular size and function. Normal size left atrium. Mild mitral regurgitation. 5.  Hyperlipidemia. 6.  Diabetes mellitus with peripheral neuropathy.     7.  Pericarditis in [facility] 25:  1.  As under cardiovascular history. 2.  Arthritis of the spine. 3.  Asthma, history of bronchitis and history of pneumonia. 4.  Bipolar depression and schizophrenia. 5.  History of breast lumpectomies.  History of left breast cancer, status post lumpectomy and radiation therapy. 6.  History of diverticulitis. 7.  History of irritable bowel syndrome. 8.  History of hiatal hernia and GERD. Endoscopy in 2019 reportedly showed superficial ulcers.   9.  Fibromyalgia. 10.  History of herpes genitalia.    11.  Fatty liver. 12.  History of migraines. 13.  History of neuropathy of feet. 14.  Osteopenia. 15.  History of pancreatitis. 12.  History of abdominal surgery for removal of polyps and history of exploratory laparotomy for removal of scar tissue. 16.  History of appendectomy. 25.  History of cholecystectomy. 23.  History of hysterectomy. 20.  History of colon surgery. 21.  History of colonoscopy. 22.  History of cystoscopy. 23.  History of bilateral cataract removal with lens implants. 24.  History of Meckel's diverticulum. 25.  History of migraine headaches. 26.  History of ovarian surgery. 32.  History of excision of birthmark from face and arm. 28.  History of nerve block to the left ilioinguinal nerve. 29.  Tardive dyskinesia. 30. Right shoulder impingement syndrome and incomplete repair of right rotator cuff.  MRI done on 2/3/2020 (after falling, hitting right shoulder) showed tearing of the rotator cuff without evidence of a recurrent or complete tear and with post surgical changes.       FAMILY HISTORY:  Mother  at age 80, had bypass surgery at age 80/80. Father  of a stroke at age 80.     SOCIAL HISTORY:  Patient does not smoke.  She rarely drinks alcohol.       O:  COMPLETE PHYSICAL EXAM:      /60   Pulse 68   Resp 16   Ht 5' 6\" (1.676 m)   Wt 152 lb (68.9 kg)   BMI 24.53 kg/m²      General:          Well-developed, well-nourished lady in no acute distress. HEENT:           Normocephalic and atraumatic head. No JVD. No carotid bruits. Carotid upstrokes normal bilaterally.  No thyromegaly.  Sclerae not icteric.  No xanthelasmas.  Mucous membranes moist.  Chest:              Symmetrical and nontender.  No deformities. Lungs:             Clear to auscultation bilaterally. Heart:              Normal S1 and S2.  No S3 or S4.  No murmurs or rubs. Abdomen:        Soft, nontender without organomegaly or masses.  No bruits.  Normal bowel sounds. Extremities:     No edema.  Pulses normal.  Skin:                Normal turgor. No rashes or ulcers noted. Neurologic:      Oriented x3.  No motor or sensory deficits detected.        REVIEW OF DIAGNOSTIC TESTS:    1. Blood tests from 3/24/2022 reviewed: In Epic: Glucose 194. Otherwise, CMP unremarkable. Hemoglobin 13.0, hematocrit 40.5. 2. EKG done today showed sinus rhythm with low QRS voltages, but was otherwise unremarkable. ASSESSMENT / DIAGNOSIS:   1.  Mild mitral regurgitation on echo in 9/2020. 2.  Diabetes mellitus with peripheral neuropathy. 3.  Hyperlipidemia. 4.  History of pericarditis.    5.  History of hiatal hernia and GERD. 6.  History of irritable bowel syndrome and history of diverticulitis.    7.  Fatty liver.    8.  Bipolar depression. 9.  Schizophrenia.    10. History of left breast cancer, S/P lumpectomy and radiation therapy.    11. Asthma with a history of bronchitis and pneumonia. 12. Arthritis of the spine. 13. Fibromyalgia. 14. Osteopenia.    15. Right shoulder impingement syndrome and partial rotator cuff tear.    16. Tardive dyskinesia.     17. New onset exertional dyspnea and chest tightness. TREATMENT PLAN:  1. Obtain echocardiogram and chest x-ray reports done recently. 2.  May have to repeat the echocardiogram.   3.  Check Pro BNP. 4.  Treadmill nuclear stress test.  5.  Further recommendations to follow.        [address] [id number] ([place]).  [facility] [address]  [phone]

## 2022-04-19 ENCOUNTER — TELEPHONE (OUTPATIENT)
Dept: CARDIOLOGY CLINIC | Age: 67
End: 2022-04-19

## 2022-04-19 NOTE — TELEPHONE ENCOUNTER
[facility] calling in needing clearance for patient to be put under anesthesia for a colonoscopy. Please advise.

## 2022-04-26 ENCOUNTER — HOSPITAL ENCOUNTER (OUTPATIENT)
Age: 67
Discharge: HOME | End: 2022-04-28
Payer: MEDICARE

## 2022-04-26 ENCOUNTER — HOSPITAL ENCOUNTER (OUTPATIENT)
Dept: GENERAL RADIOLOGY | Age: 67
Discharge: HOME | End: 2022-04-28
Payer: MEDICARE

## 2022-04-26 DIAGNOSIS — M25.552 LEFT HIP PAIN: ICD-10-CM

## 2022-04-26 PROCEDURE — 73502 X-RAY EXAM HIP UNI 2-3 VIEWS: CPT

## 2022-05-12 ENCOUNTER — TELEPHONE (OUTPATIENT)
Dept: CARDIOLOGY | Age: 67
End: 2022-05-12

## 2022-05-12 NOTE — TELEPHONE ENCOUNTER
Spoke with patient reminder of her appointment on 5/17/22 at 0930 for a Nuclear stress test. Instructions to hold Inderal 24 hours prior to the test, NPO after midnight, no caffeine 12 hours before the test, check her blood sugar in the am, hold diabetic medications the morning of the test. Patient stated has neuropathy both  feet due to DM, but is willing to try to walk on treadmill.  Patient verbalized an understanding and confirmed the appointment

## 2022-05-17 ENCOUNTER — HOSPITAL ENCOUNTER (OUTPATIENT)
Dept: CARDIOLOGY | Age: 67
Discharge: HOME | End: 2022-05-17
Payer: MEDICARE

## 2022-05-17 VITALS
HEIGHT: 66 IN | HEART RATE: 70 BPM | DIASTOLIC BLOOD PRESSURE: 77 MMHG | BODY MASS INDEX: 24.43 KG/M2 | SYSTOLIC BLOOD PRESSURE: 129 MMHG | WEIGHT: 152 LBS

## 2022-05-17 LAB
LV EF: 78 %
LVEF MODALITY: NORMAL

## 2022-05-17 PROCEDURE — 2580000003 HC RX 258: Performed by: INTERNAL MEDICINE

## 2022-05-17 PROCEDURE — 78452 HT MUSCLE IMAGE SPECT MULT: CPT

## 2022-05-17 PROCEDURE — A9502 TC99M TETROFOSMIN: HCPCS | Performed by: INTERNAL MEDICINE

## 2022-05-17 PROCEDURE — 3430000000 HC RX DIAGNOSTIC RADIOPHARMACEUTICAL: Performed by: INTERNAL MEDICINE

## 2022-05-17 PROCEDURE — 93017 CV STRESS TEST TRACING ONLY: CPT

## 2022-05-17 RX ORDER — SODIUM CHLORIDE 0.9 % (FLUSH) 0.9 %
10 SYRINGE (ML) INJECTION PRN
Status: DISCONTINUED | OUTPATIENT
Start: 2022-05-17 | End: 2022-05-18 | Stop reason: HOSPADM

## 2022-05-17 RX ADMIN — TETROFOSMIN 35.4 MILLICURIE: 0.23 INJECTION, POWDER, LYOPHILIZED, FOR SOLUTION INTRAVENOUS at 11:07

## 2022-05-17 RX ADMIN — TETROFOSMIN 10 MILLICURIE: 0.23 INJECTION, POWDER, LYOPHILIZED, FOR SOLUTION INTRAVENOUS at 09:53

## 2022-05-17 RX ADMIN — SODIUM CHLORIDE, PRESERVATIVE FREE 10 ML: 5 INJECTION INTRAVENOUS at 11:08

## 2022-05-17 RX ADMIN — SODIUM CHLORIDE, PRESERVATIVE FREE 10 ML: 5 INJECTION INTRAVENOUS at 09:52

## 2022-05-17 NOTE — PROCEDURES
[address] [facility]. [address], [facility]  [phone]                 Exercise Stress Nuclear Gated SPECT Study    Name: Alejandro Wagner Account Number: [de-identified]    :  1955      Sex: female              Date of Study:  2022    Height: 5' 6\" (167.6 cm)  Weight: 152 lb (68.9 kg)     Ordering Provider: Nathaniel Albright          PCP: Ryder Verde DO      Cardiologist: Pedrito Walker MD                        Interpreting Physician: Pedrito Walker MD  _________________________________________________________________________________    Indication:   Detecting the presence and location of coronary artery disease    Clinical History:   Patient has no known history of coronary artery disease. Resting ECG:    MO int 160m sec, QRS int 80m sec, QT int 406m sec; HR 70 bpm  SR and is within normal limits    Exercise: The patient exercised using a Braydon protocol, completing 5:00 minutes and reaching an estimated work load of 6.9 metabolic equivalents (METS). Resting HR was 70. Peak exercise heart rate was 137 ( 88% of maximum predicted heart rate for age). Baseline /77  . Peak exercise /79. The blood pressure response to exercise was normal      Exercise was terminated due to dyspnea and heart rate attained. The patient experienced no chest pain with exercise. Pulse oximetry was used to monitor oxygen saturation during the stress test.  The study was performed on Room Air. The resting pulse oximeter was 99%. The post stress O2 saturation seen during exercise was 99 %. Exercise ECG:   The patient demonstrated rare PVC during exercise. With exercise, there were no ST segment changes of significance at the heart rate achieved.     IMAGING: Myocardial perfusion imaging was performed at rest 30-35 minutes following the intravenous injection of 10.0 mCi of (Tc-tetrofosmin) followed by 10 ml of Normal Saline. At peak exercise, the patient was injected intravenously with 35.4 mCi of (Tc-tetrofosmin) followed by 10 ml of Normal Saline. Gated post-stress tomographic imaging was performed 20-25 minutes after stress. FINDINGS: The overall quality of the study was good. Left ventricular cavity size was noted to be normal.    Rotational analog analysis demonstrated abnormal patient motion. The gated SPECT stress imaging in the short, vertical long, and horizontal long axis demonstrated normal homogeneous tracer distribution throughout the myocardium both on the post stress and resting images. Gated SPECT left ventricular ejection fraction was calculated to be 78%, with normal myocardial thickening and wall motion. Impression:    1. Exercise EKG was normal.  2. The patient experienced no chest pain with exercise. 3. The myocardial perfusion imaging was normal.    4. Overall left ventricular systolic function was normal without regional wall motion abnormalities. 5. Exercise capacity was below average to average. 6. Low risk general exercise nuclear stress test.    Thank you for sending your patient to this [employer].      Electronically signed by Janette Pathak MD on 5/17/2022 at 1:15 PM

## 2022-05-31 ENCOUNTER — OFFICE VISIT (OUTPATIENT)
Dept: CARDIOLOGY CLINIC | Age: 67
End: 2022-05-31
Payer: MEDICARE

## 2022-05-31 VITALS
DIASTOLIC BLOOD PRESSURE: 66 MMHG | HEART RATE: 65 BPM | HEIGHT: 66 IN | WEIGHT: 151 LBS | BODY MASS INDEX: 24.27 KG/M2 | RESPIRATION RATE: 16 BRPM | SYSTOLIC BLOOD PRESSURE: 110 MMHG

## 2022-05-31 DIAGNOSIS — K58.9 IRRITABLE BOWEL SYNDROME, UNSPECIFIED TYPE: ICD-10-CM

## 2022-05-31 DIAGNOSIS — K44.9 HIATAL HERNIA WITH GASTROESOPHAGEAL REFLUX: ICD-10-CM

## 2022-05-31 DIAGNOSIS — I36.1 NONRHEUMATIC TRICUSPID VALVE REGURGITATION: ICD-10-CM

## 2022-05-31 DIAGNOSIS — Z86.79 H/O PERICARDITIS: ICD-10-CM

## 2022-05-31 DIAGNOSIS — K21.9 HIATAL HERNIA WITH GASTROESOPHAGEAL REFLUX: ICD-10-CM

## 2022-05-31 DIAGNOSIS — E78.2 MIXED HYPERLIPIDEMIA: ICD-10-CM

## 2022-05-31 DIAGNOSIS — M85.80 OSTEOPENIA, UNSPECIFIED LOCATION: ICD-10-CM

## 2022-05-31 DIAGNOSIS — Z85.3 HISTORY OF LEFT BREAST CANCER: ICD-10-CM

## 2022-05-31 DIAGNOSIS — Z90.49 HX OF CHOLECYSTECTOMY: ICD-10-CM

## 2022-05-31 DIAGNOSIS — K76.0 FATTY LIVER: ICD-10-CM

## 2022-05-31 DIAGNOSIS — J45.20 MILD INTERMITTENT ASTHMA WITHOUT COMPLICATION: ICD-10-CM

## 2022-05-31 DIAGNOSIS — G24.01 TARDIVE DYSKINESIA: ICD-10-CM

## 2022-05-31 DIAGNOSIS — E11.8 DM (DIABETES MELLITUS), TYPE 2 WITH COMPLICATIONS (HCC): ICD-10-CM

## 2022-05-31 DIAGNOSIS — I34.0 NONRHEUMATIC MITRAL VALVE REGURGITATION: ICD-10-CM

## 2022-05-31 DIAGNOSIS — M79.7 FIBROMYALGIA: ICD-10-CM

## 2022-05-31 DIAGNOSIS — F20.9 SCHIZOPHRENIA, UNSPECIFIED TYPE (HCC): ICD-10-CM

## 2022-05-31 DIAGNOSIS — Z87.19 H/O DIVERTICULITIS OF COLON: ICD-10-CM

## 2022-05-31 DIAGNOSIS — F31.9 BIPOLAR DEPRESSION (HCC): ICD-10-CM

## 2022-05-31 DIAGNOSIS — M47.9 OSTEOARTHRITIS OF SPINE, UNSPECIFIED SPINAL OSTEOARTHRITIS COMPLICATION STATUS, UNSPECIFIED SPINAL REGION: ICD-10-CM

## 2022-05-31 DIAGNOSIS — M19.011 ARTHROPATHY OF RIGHT SHOULDER: ICD-10-CM

## 2022-05-31 DIAGNOSIS — R06.09 DOE (DYSPNEA ON EXERTION): Primary | ICD-10-CM

## 2022-05-31 PROCEDURE — 93000 ELECTROCARDIOGRAM COMPLETE: CPT | Performed by: INTERNAL MEDICINE

## 2022-05-31 PROCEDURE — 99214 OFFICE O/P EST MOD 30 MIN: CPT | Performed by: INTERNAL MEDICINE

## 2022-05-31 PROCEDURE — 1123F ACP DISCUSS/DSCN MKR DOCD: CPT | Performed by: INTERNAL MEDICINE

## 2022-05-31 NOTE — PROGRESS NOTES
OFFICE VISIT        PRIMARY CARE PHYSICIAN:    [address] [facility],        ALLERGIES / SENSITIVITIES:    Allergies   Allergen Reactions    Codeine Swelling     Pure codeine gets itching    Macrobid [Nitrofurantoin Monohydrate Macrocrystals] Rash    Pcn [Penicillins] Hives     On stomach and thighs      Neosporin [Neomycin-Polymyx-Gramicid] Dermatitis    Sulfa Antibiotics Dermatitis     Skin turns red      Ultram [Tramadol Hcl] Itching     & hallucinations          REVIEWED MEDICATIONS:      Current Outpatient Medications:     Ubrogepant (UBRELVY) 100 MG TABS, Take 50 mg by mouth prn, Disp: , Rfl:     diphenoxylate-atropine (LOMOTIL) 2.5-0.025 MG per tablet, Take 1 tablet by mouth 4 times daily as needed for Diarrhea., Disp: , Rfl:     omeprazole (PRILOSEC) 20 MG delayed release capsule, Take 20 mg by mouth daily, Disp: , Rfl:     meclizine (ANTIVERT) 25 MG tablet, Take 1 tablet by mouth every 8 hours as needed, Disp: , Rfl:     oxyCODONE-acetaminophen (PERCOCET) 5-325 MG per tablet, TAKE ONE TABLET BY MOUTH EVERY 8 HOURS AS NEEDED, Disp: , Rfl:     metFORMIN (GLUCOPHAGE-XR) 500 MG extended release tablet, TAKE TWO TABLETS BY MOUTH TWO TIMES A DAY, Disp: , Rfl:     buPROPion (WELLBUTRIN SR) 150 MG extended release tablet, TAKE 1 TABLET BY MOUTH ONCE DAILY, Disp: , Rfl: 0    famotidine (PEPCID) 40 MG tablet, TAKE ONE TABLET BY MOUTH EVERY DAY, Disp: , Rfl: 1    aspirin 81 MG tablet, Take 81 mg by mouth daily , Disp: , Rfl:     Dulaglutide (TRULICITY SC), Inject 5.75 mg into the skin every 7 days Q Tuesdays, Disp: , Rfl:     loxapine (LOXITANE) 10 MG capsule, Take 10 mg by mouth nightly , Disp: , Rfl:     B Complex-Biotin-FA (B-50 COMPLEX PO), Take by mouth 2 times daily, Disp: , Rfl:     ondansetron (ZOFRAN) 4 MG tablet, Take 4 mg by mouth every 8 hours as needed for Nausea or Vomiting, Disp: , Rfl:     propranolol (INDERAL) 20 MG tablet, Take 20 mg by mouth 2 times daily prn, Disp: , Rfl:    sucralfate (CARAFATE) 1 GM tablet, Take 1 g by mouth daily , Disp: , Rfl:     valACYclovir (VALTREX) 1 G tablet, Take 500 mg by mouth daily , Disp: , Rfl:     montelukast (SINGULAIR) 10 MG tablet, Take 10 mg by mouth nightly., Disp: , Rfl:     simvastatin (ZOCOR) 20 MG tablet, Take 20 mg by mouth every morning (before breakfast) , Disp: , Rfl:     ascorbic acid (VITAMIN C) 500 MG tablet, Take 500 mg by mouth 2 times daily. , Disp: , Rfl:     Potassium 99 MG TABS, Take 1 capsule by mouth daily. , Disp: , Rfl:     CALCIUM-VITAMIN D PO, Take 1 tablet by mouth daily 1200 mg/ 25mg, Disp: , Rfl:     losartan (COZAAR) 25 MG tablet, Take 25 mg by mouth daily. , Disp: , Rfl:     Multiple Vitamin (MULTIVITAMIN PO), Take 1 tablet by mouth once., Disp: , Rfl:     fish oil-omega-3 fatty acids 1000 MG capsule, Take 1 g by mouth 3 times daily , Disp: , Rfl:     sertraline (ZOLOFT) 100 MG tablet, Take 100 mg by mouth nightly , Disp: , Rfl:     methocarbamol (ROBAXIN) 750 MG tablet, take 1 tablet by mouth every 12 hours as needed, Disp: , Rfl:       S: REASON FOR VISIT:    Exertional dyspnea. To discuss the results of the stress test.  Royal Ca is a pleasant, 55-year-old lady with history of pericarditis and risk factors for coronary artery disease. She was seen in the office on 4/12/2022 because of reported exertional dyspnea. She walks on a track for 20 minutes 3 times a week and reported at that time that if she walks fast, she gets short of breath and also she reported shortness of breath going up more than 12 steps. She had an echocardiogram done by her PCP, which was read as showing an ejection fraction of 60% with mild mitral regurgitation and mild tricuspid regurgitation. At the time of her office visit, she had a pro BNP done, which was normal.  She underwent a treadmill exercise stress test on 5/17/2022, which was unremarkable (see below under cardiovascular history).   She returns today for follow up and to discuss the results of the stress test.  She denies any worsening exertional dyspnea. She denies chest pain. She denies orthopnea, PND's, significant lower extremity swelling, palpitations, dizziness, presyncope or syncope. REVIEW OF SYSTEMS:    CONSTITUTIONAL:  Denies fevers, chills, night sweats or fatigue. HEENT:  Denies any unusual headaches.  Denies recent changes in hearing or vision.  Denies dysphagia, hoarseness, hemoptysis, hematemesis or epistaxis. ENDOCRINE:  Denies polyphagia, polydipsia or polyuria.  Denies heat or cold intolerance. MUSCULOSKELETAL:  She has pains in the lateral aspect of her feet that are attributed to diabetic peripheral neuropathy.    SKIN:  Denies rashes, ulcers or itching. HEME/LYMPH:  Denies any palpable lymph nodes, bleeding or easy bruisability. HEART:  As above. LUNGS:  Denies any significant cough or sputum production. GI: she has epigastric discomfort.  Denies nausea, vomiting, diarrhea, constipation, rectal bleeding or tarry stools. :  Denies hematuria or dysuria. PSYCHIATRIC:  Has history of bipolar depression and schizophrenia but denies any recent mood changes or anxiety. NEUROLOGIC:  She has tardive dyskinesia.  Denies memory loss, motor weakness, numbness, tingling or tremors.                    CARDIOVASCULAR HISTORY:    1.  Reported vertebrobasilar transient ischemic attack in 05/2003 with no recurrence. 2.  Cardiac catheterization 11/27/2001.  Patent coronary arteries.  Normal LV function. 3.  Lexiscan nuclear stress test done 02/15/2014 was within normal limits with no evidence of scars or stress-induced ischemia with no regional wall motion abnormality and with a calculated ejection fraction of 68%.   4.  Echocardiogram done on 9/8/2020 showed normal left ventricular size and wall thickness with no gross regional wall motion abnormality with an ejection fraction of 60 +/- 5% with stage 1 left ventricular diastolic dysfunction, normal right ventricular size and function.  Normal size left atrium.  Mild mitral regurgitation.    5.  Hyperlipidemia. 6.  Diabetes mellitus with peripheral neuropathy.    7.  Pericarditis in 1991 and 1996.   8.  Echocardiogram done on 4/8/2022 was reported as showing an ejection fraction of 60% with mild mitral regurgitation and mild tricuspid regurgitation. 9.  Pro BNP, 4/12/2022:  97.    10.  Treadmill nuclear stress test done on 5/17/2022: Braydon protocol, 5 minutes. 88% of the maximum predicted heart rate. Physiologic BP response. 6.9 METS. No chest pain. No ischemic EKG changes. Rare PVC's. Nuclear images were normal with the gated views showing normal myocardial thickening and wall motion with the calculated ejection fraction of 78%.       PAST MEDICAL HISTORY:  1.  As under cardiovascular history. 2.  Arthritis of the spine. 3.  Asthma, history of bronchitis and history of pneumonia. 4.  Bipolar depression and schizophrenia. 5.  History of breast lumpectomies.  History of left breast cancer, status post lumpectomy and radiation therapy. 6.  History of diverticulitis. 7.  History of irritable bowel syndrome. 8.  History of hiatal hernia and GERD. Endoscopy in 2/2019 reportedly showed superficial ulcers.   9.  Fibromyalgia. 10.  History of herpes genitalia.    11.  Fatty liver. 12.  History of migraines. 13.  History of neuropathy of feet. 14.  Osteopenia. 15.  History of pancreatitis. 12.  History of abdominal surgery for removal of polyps and history of exploratory laparotomy for removal of scar tissue. 16.  History of appendectomy. 25.  History of cholecystectomy. 23.  History of hysterectomy. 20.  History of colon surgery. 21.  History of colonoscopy. 22.  History of cystoscopy. 23.  History of bilateral cataract removal with lens implants. 24.  History of Meckel's diverticulum. 25.  History of migraine headaches. 26.  History of ovarian surgery.   27.  History of excision of birthmark from face and arm. 28.  History of nerve block to the left ilioinguinal nerve. 29.  Tardive dyskinesia. 30. Right shoulder impingement syndrome and incomplete repair of right rotator cuff.  MRI done on 2/3/2020 (after falling, hitting right shoulder) showed tearing of the rotator cuff without evidence of a recurrent or complete tear and with post surgical changes.       FAMILY HISTORY:  Mother  at age 80, had bypass surgery at age 80/80. Father  of a stroke at age 80.     SOCIAL HISTORY:  Patient does not smoke.  She rarely drinks alcohol.       O:  COMPLETE PHYSICAL EXAM:      /66   Pulse 65   Resp 16   Ht 5' 6\" (1.676 m)   Wt 151 lb (68.5 kg)   BMI 24.37 kg/m²      General:          Well-developed, well-nourished lady in no acute distress. HEENT:           Normocephalic and atraumatic head. No JVD. No carotid bruits. Carotid upstrokes normal bilaterally.  No thyromegaly.    Sclerae not icteric.  No xanthelasmas.  Mucous membranes moist.  Chest:              Symmetrical and nontender.  No deformities. Lungs:             Clear to auscultation bilaterally. Heart:              Normal S1 and S2.  No S3 or S4.  No murmurs or rubs. Abdomen:        Soft, nontender without organomegaly or masses.  No bruits.  Normal bowel sounds. Extremities:     No edema.  Pulses normal.  Skin:                Normal turgor. No rashes or ulcers noted. Neurologic:      Oriented x3.  No motor or sensory deficits detected. REVIEW OF DIAGNOSTIC TESTS:    1. EKG done today showed sinus rhythm with low QRS voltages, but was otherwise within normal limits. ASSESSMENT / DIAGNOSIS:   1.  Mild mitral regurgitation on echo in 2022.   2.  Diabetes mellitus with peripheral neuropathy.    3.  Hyperlipidemia. 4.  History of pericarditis.    5.  History of hiatal hernia and GERD. 6.  History of irritable bowel syndrome and history of diverticulitis.    7.  Fatty liver.    8.  Bipolar depression.    9.  Schizophrenia.    10. History of left breast cancer, S/P lumpectomy and radiation therapy.    11. Asthma with a history of bronchitis and pneumonia. 12. Arthritis of the spine. 13. Fibromyalgia. 14. Osteopenia.    15. Right shoulder impingement syndrome and partial rotator cuff tear.    16. Tardive dyskinesia.     17. Exertional dyspnea. 18.  Normal treadmill nuclear stress test.        TREATMENT PLAN:  1. Reassure. 2.  Continue current cardiac medications. 3.  Patient advised to remain active and follow a heart healthy diet. 4.  Return to Cardiology in 1 year or on a prn basis. Alyssa Bravo.  [facility] [address]  [phone]

## 2022-08-16 ENCOUNTER — HOSPITAL ENCOUNTER (OUTPATIENT)
Age: 67
Discharge: HOME | End: 2022-08-18
Payer: MEDICARE

## 2022-08-16 ENCOUNTER — HOSPITAL ENCOUNTER (OUTPATIENT)
Dept: GENERAL RADIOLOGY | Age: 67
Discharge: HOME | End: 2022-08-18
Payer: MEDICARE

## 2022-08-16 DIAGNOSIS — R19.7 DIARRHEA, UNSPECIFIED TYPE: ICD-10-CM

## 2022-08-16 PROCEDURE — 74018 RADEX ABDOMEN 1 VIEW: CPT

## 2022-10-06 DIAGNOSIS — C50.411 MALIGNANT NEOPLASM OF UPPER-OUTER QUADRANT OF RIGHT BREAST IN FEMALE, ESTROGEN RECEPTOR POSITIVE (HCC): Primary | ICD-10-CM

## 2022-10-06 DIAGNOSIS — Z17.0 MALIGNANT NEOPLASM OF UPPER-OUTER QUADRANT OF RIGHT BREAST IN FEMALE, ESTROGEN RECEPTOR POSITIVE (HCC): Primary | ICD-10-CM

## 2022-10-07 ENCOUNTER — HOSPITAL ENCOUNTER (OUTPATIENT)
Dept: INFUSION THERAPY | Age: 67
Discharge: HOME | End: 2022-10-07
Payer: MEDICARE

## 2022-10-07 DIAGNOSIS — C50.411 MALIGNANT NEOPLASM OF UPPER-OUTER QUADRANT OF RIGHT BREAST IN FEMALE, ESTROGEN RECEPTOR POSITIVE (HCC): ICD-10-CM

## 2022-10-07 DIAGNOSIS — Z17.0 MALIGNANT NEOPLASM OF UPPER-OUTER QUADRANT OF RIGHT BREAST IN FEMALE, ESTROGEN RECEPTOR POSITIVE (HCC): ICD-10-CM

## 2022-10-07 LAB
ALBUMIN SERPL-MCNC: 4.4 G/DL (ref 3.5–5.2)
ALP BLD-CCNC: 51 U/L (ref 35–104)
ALT SERPL-CCNC: 18 U/L (ref 0–32)
ANION GAP SERPL CALCULATED.3IONS-SCNC: 12 MMOL/L (ref 7–16)
AST SERPL-CCNC: 22 U/L (ref 0–31)
BASOPHILS ABSOLUTE: 0.03 E9/L (ref 0–0.2)
BASOPHILS RELATIVE PERCENT: 0.5 % (ref 0–2)
BILIRUB SERPL-MCNC: <0.2 MG/DL (ref 0–1.2)
BUN BLDV-MCNC: 9 MG/DL (ref 6–23)
CALCIUM SERPL-MCNC: 9.3 MG/DL (ref 8.6–10.2)
CHLORIDE BLD-SCNC: 101 MMOL/L (ref 98–107)
CO2: 25 MMOL/L (ref 22–29)
CREAT SERPL-MCNC: 0.7 MG/DL (ref 0.5–1)
EOSINOPHILS ABSOLUTE: 0.2 E9/L (ref 0.05–0.5)
EOSINOPHILS RELATIVE PERCENT: 3.1 % (ref 0–6)
FERRITIN: 32 NG/ML
GFR AFRICAN AMERICAN: >60
GFR NON-AFRICAN AMERICAN: >60 ML/MIN/1.73
GLUCOSE BLD-MCNC: 162 MG/DL (ref 74–99)
HCT VFR BLD CALC: 38.8 % (ref 34–48)
HEMOGLOBIN: 13 G/DL (ref 11.5–15.5)
IMMATURE GRANULOCYTES #: 0.01 E9/L
IMMATURE GRANULOCYTES %: 0.2 % (ref 0–5)
IRON SATURATION: 24 % (ref 15–50)
IRON: 70 MCG/DL (ref 37–145)
LYMPHOCYTES ABSOLUTE: 2.42 E9/L (ref 1.5–4)
LYMPHOCYTES RELATIVE PERCENT: 37.3 % (ref 20–42)
MCH RBC QN AUTO: 33.3 PG (ref 26–35)
MCHC RBC AUTO-ENTMCNC: 33.5 % (ref 32–34.5)
MCV RBC AUTO: 99.5 FL (ref 80–99.9)
MONOCYTES ABSOLUTE: 0.59 E9/L (ref 0.1–0.95)
MONOCYTES RELATIVE PERCENT: 9.1 % (ref 2–12)
NEUTROPHILS ABSOLUTE: 3.24 E9/L (ref 1.8–7.3)
NEUTROPHILS RELATIVE PERCENT: 49.8 % (ref 43–80)
PDW BLD-RTO: 13.1 FL (ref 11.5–15)
PLATELET # BLD: 247 E9/L (ref 130–450)
PMV BLD AUTO: 9.3 FL (ref 7–12)
POTASSIUM SERPL-SCNC: 4.1 MMOL/L (ref 3.5–5)
RBC # BLD: 3.9 E12/L (ref 3.5–5.5)
SODIUM BLD-SCNC: 138 MMOL/L (ref 132–146)
TOTAL IRON BINDING CAPACITY: 292 MCG/DL (ref 250–450)
TOTAL PROTEIN: 7.1 G/DL (ref 6.4–8.3)
WBC # BLD: 6.5 E9/L (ref 4.5–11.5)

## 2022-10-07 PROCEDURE — 82728 ASSAY OF FERRITIN: CPT

## 2022-10-07 PROCEDURE — 83540 ASSAY OF IRON: CPT

## 2022-10-07 PROCEDURE — 80053 COMPREHEN METABOLIC PANEL: CPT

## 2022-10-07 PROCEDURE — 83550 IRON BINDING TEST: CPT

## 2022-10-07 PROCEDURE — 85025 COMPLETE CBC W/AUTO DIFF WBC: CPT

## 2022-10-07 PROCEDURE — 36415 COLL VENOUS BLD VENIPUNCTURE: CPT

## 2022-10-10 ENCOUNTER — OFFICE VISIT (OUTPATIENT)
Dept: ONCOLOGY | Age: 67
End: 2022-10-10
Payer: MEDICARE

## 2022-10-10 VITALS
TEMPERATURE: 97.8 F | BODY MASS INDEX: 23.78 KG/M2 | DIASTOLIC BLOOD PRESSURE: 70 MMHG | HEART RATE: 69 BPM | SYSTOLIC BLOOD PRESSURE: 105 MMHG | OXYGEN SATURATION: 97 % | HEIGHT: 66 IN | WEIGHT: 148 LBS

## 2022-10-10 DIAGNOSIS — C50.612 MALIGNANT NEOPLASM OF AXILLARY TAIL OF LEFT BREAST IN FEMALE, ESTROGEN RECEPTOR POSITIVE (HCC): Primary | ICD-10-CM

## 2022-10-10 DIAGNOSIS — Z17.0 MALIGNANT NEOPLASM OF AXILLARY TAIL OF LEFT BREAST IN FEMALE, ESTROGEN RECEPTOR POSITIVE (HCC): Primary | ICD-10-CM

## 2022-10-10 PROCEDURE — 99213 OFFICE O/P EST LOW 20 MIN: CPT | Performed by: INTERNAL MEDICINE

## 2022-10-10 NOTE — PROGRESS NOTES
[address] [facility]. [facility]        Pt Name: Ayesha Bernheim: 1955  Date of evaluation: 10/10/2022  Primary Care Physician: Delfino Connor DO  Reason for evaluation:   Chief Complaint   Patient presents with    Cancer    Follow-up     Breast cancer          Subjective:  Here for yearly follow-up. Feels better than her hemoglobin is improved to the normal range. Was C/O dizziness and giddiness,headaches . MRI Brain was ordered by PCP. It was negative except for mild sphenoid sinusitis. C/O right shoulder pain. Received cortisone injection right shoulder by Dr. Ya Quinn on 3/18/19. It helped her pain temporarily. S/P right shoulder arthroscopy with subacromial arch decompression,  Labral debridement, Debridement of partial thickness rotator cuff tear, and  biceps tenotomy on 8/30/2019 by Dr. Luda Norton. OBJECTIVE:  VITALS:  height is 5' 6\" (1.676 m) and weight is 148 lb (67.1 kg). Her temperature is 97.8 °F (36.6 °C). Her blood pressure is 105/70 and her pulse is 69. Her oxygen saturation is 97%. Physical Exam:  Performance Status: 0  Well developed, well nourished female  EYES: sclera non-icteric   ENT: oropharynx clear. NECK: No lymphadenopathy. Mild soft tissue swelling in the right neck without palpable lymphadenopathy. BREASTS :Firmness and tenderness along lump in left breast with a small 0.5-1 cm nodule felt just inferior to scar. HEART: Regular rate and rhythm. LUNGS: Clear. ABDOMEN: Soft, non-tender. No mass or organomegaly. EXTREMITIES: without edema. NEUROLOGIC: No focal deficits. Medications  Prior to Admission medications    Medication Sig Start Date End Date Taking?  Authorizing Provider   methocarbamol (ROBAXIN) 750 MG tablet take 1 tablet by mouth every 12 hours as needed 2/28/22  Yes Historical Provider, MD   Ubrogepant 50 MG TABS Take 50 mg by mouth prn   Yes Historical Provider, MD   diphenoxylate-atropine (LOMOTIL) 2.5-0.025 MG per tablet Take 1 tablet by mouth 4 times daily as needed for Diarrhea. Yes Historical Provider, MD   omeprazole (PRILOSEC) 20 MG delayed release capsule Take 20 mg by mouth daily   Yes Historical Provider, MD   meclizine (ANTIVERT) 25 MG tablet Take 1 tablet by mouth every 8 hours as needed 10/8/20  Yes Historical Provider, MD   oxyCODONE-acetaminophen (PERCOCET) 5-325 MG per tablet TAKE ONE TABLET BY MOUTH EVERY 8 HOURS AS NEEDED 6/10/20  Yes Historical Provider, MD   metFORMIN (GLUCOPHAGE-XR) 500 MG extended release tablet TAKE TWO TABLETS BY MOUTH TWO TIMES A DAY 12/23/19  Yes Historical Provider, MD   buPROPion (WELLBUTRIN SR) 150 MG extended release tablet TAKE 1 TABLET BY MOUTH ONCE DAILY 12/9/19  Yes Historical Provider, MD   famotidine (PEPCID) 40 MG tablet TAKE ONE TABLET BY MOUTH EVERY DAY 11/3/19  Yes Historical Provider, MD   aspirin 81 MG tablet Take 81 mg by mouth daily    Yes Historical Provider, MD   Dulaglutide (TRULICITY SC) Inject 3.11 mg into the skin every 7 days Q Tuesdays   Yes Historical Provider, MD   loxapine (LOXITANE) 10 MG capsule Take 10 mg by mouth nightly    Yes Historical Provider, MD   B Complex-Biotin-FA (B-50 COMPLEX PO) Take by mouth 2 times daily   Yes Historical Provider, MD   ondansetron (ZOFRAN) 4 MG tablet Take 4 mg by mouth every 8 hours as needed for Nausea or Vomiting   Yes Historical Provider, MD   propranolol (INDERAL) 20 MG tablet Take 20 mg by mouth 2 times daily prn   Yes Historical Provider, MD   sucralfate (CARAFATE) 1 GM tablet Take 1 g by mouth daily    Yes Historical Provider, MD   valACYclovir (VALTREX) 1 G tablet Take 500 mg by mouth daily    Yes Historical Provider, MD   montelukast (SINGULAIR) 10 MG tablet Take 10 mg by mouth nightly.    Yes Historical Provider, MD   simvastatin (ZOCOR) 20 MG tablet Take 20 mg by mouth every morning (before breakfast)    Yes Historical Provider, MD   ascorbic acid (VITAMIN C) 500 MG tablet Take 500 mg by mouth 2 times daily. Yes Historical Provider, MD   Potassium 99 MG TABS Take 1 capsule by mouth daily. Yes Historical Provider, MD   CALCIUM-VITAMIN D PO Take 1 tablet by mouth daily 1200 mg/ 25mg   Yes Historical Provider, MD   losartan (COZAAR) 25 MG tablet Take 25 mg by mouth daily. Yes Historical Provider, MD   Multiple Vitamin (MULTIVITAMIN PO) Take 1 tablet by mouth once. Yes Historical Provider, MD   fish oil-omega-3 fatty acids 1000 MG capsule Take 1 g by mouth 3 times daily    Yes Historical Provider, MD   sertraline (ZOLOFT) 100 MG tablet Take 100 mg by mouth nightly    Yes Historical Provider, MD    Scheduled Meds:  Continuous Infusions:  PRN Meds:.        Recent Laboratory Data-     Lab Results   Component Value Date    WBC 6.5 10/07/2022    HGB 13.0 10/07/2022    HCT 38.8 10/07/2022    MCV 99.5 10/07/2022     10/07/2022    LYMPHOPCT 37.3 10/07/2022    RBC 3.90 10/07/2022    MCH 33.3 10/07/2022    MCHC 33.5 10/07/2022    RDW 13.1 10/07/2022    NEUTOPHILPCT 49.8 10/07/2022    MONOPCT 9.1 10/07/2022    BASOPCT 0.5 10/07/2022    NEUTROABS 3.24 10/07/2022    LYMPHSABS 2.42 10/07/2022    MONOSABS 0.59 10/07/2022    EOSABS 0.20 10/07/2022    BASOSABS 0.03 10/07/2022       Lab Results   Component Value Date     10/07/2022    K 4.1 10/07/2022     10/07/2022    CO2 25 10/07/2022    BUN 9 10/07/2022    CREATININE 0.7 10/07/2022    GLUCOSE 162 (H) 10/07/2022    CALCIUM 9.3 10/07/2022    PROT 7.1 10/07/2022    LABALBU 4.4 10/07/2022    BILITOT <0.2 10/07/2022    ALKPHOS 51 10/07/2022    AST 22 10/07/2022    ALT 18 10/07/2022    LABGLOM >60 10/07/2022    GFRAA >60 10/07/2022         Lab Results   Component Value Date    IRON 70 10/07/2022    TIBC 292 10/07/2022    FERRITIN 32 10/07/2022          Ref.  Range 3/17/2014 11:39 10/27/2014 11:18 4/27/2015 14:48 11/4/2015 15:08 5/4/2016 13:19   CA 15-3 Latest Ref Range: 0 - 31 U/mL 10 10 9 9 10           Radiology-  MRI BRAIN: 10/08/2020  No acute intracranial abnormality. New mild chronic microvascular disease within the periventricular white    matter. Moderate mucosal thickening within the left sphenoid sinus. US CAROTID ARTERY BILATERAL:  10/05/2020. There is no measurable stenosis of the right or left carotid arteries. No    plaque is appreciated. Bilateral vertebral arteries are patent with flow in the normal direction. SCREENING MAMMOGRAM BILATERAL:  11/06/2019  1. Stable mammogram without evidence of malignancy. Recommendation:      1. Annual screening mammography is recommended. BI-RADS Category:  1 - Negative              CT ABDOMEN AND PELVIS:  2/6/19  1. Tiny focus of air within the urinary bladder, possibly related to   recent instrumentation or fistula. A small amount of air has been   noted within the bladder dating back to CTs from 9/28/2014. There is   no definite focal bladder wall thickening. 2. Bilateral kidneys are normal in morphology and demonstrate   symmetric enhancement. ASSESSMENT/PLAN :  3 A 77year-old woman with DCIS involving the left breast, diagnosed in Nov 2010,status post excision with clear margins. She completed radiation to the left breast . Pt had positive ER and OR receptors. She was not a good candidate for Tamoxifen in the preventive setting because of severe bipolar disorder,and the fact that she has had in the past TIA and has been on aspirin and Persantine. So in order to prevent potential for exacerbation of depression or thromboembolic events, patient was simply recommended surveillance and she continues to do well at this time without any evidence of disease recurrence.   She had follow up mammogram in OCT 2018 which was negative      2- Anemia By Hx diagnosed in [address] [place] [address] [address] likely related to post operative state with operative blood losses ,iron deficiency and myelosuppression by cellulitis and abdominal wall infection . She also had NSAID induced gastritis . She has been intolerant of oral Iron supplements with dyspepsia and constipation and was recommended parenteral IV iron . She responded well with normalization of her Hb and iron levels. To continue Protonix and Zantac. Had follow up EGD with Dr Ari Edgar in MAY with findings of healed ulcers . Her repeat CBC showed recurrent anemia with a hemoglobin of 11 and her iron studies are consistent with iron deficiency anemia. The fact that she is on Zantac and Pantoprazole with lack of stomach acidity, her oral iron absorption is impaired and she will be recommended parenteral IV iron with Feraheme. Her anemia is likely related to GI blood losses from gastritis as well as colitis. Her last colonoscopy in 2016 had shown only colitis  She responded well to parenteral IV iron and her hemoglobin is up to normal range 12.1 on 5/16/18  It is slightly down to 11.1 In December 2018  She responded well to parenteral IV iron And her hemoglobin today is up to 13.3     3- Abdominal wall cellulitis resolved . 4-Mildly elevated transaminases chronic in nature and attributed to fatty liver secondary to diabetes  Most recent hepatic panel is back to normal range      Ultrasound of thyroid and neck was entirely negative   She has been diagnosed on CT scan with small amount of air in her urinary bladder with suspicion of a fistula  and she had a cystoscopy 2/25/19. Scottie Higuera 5- Recent headaches and dizziness. MRI of brain negative except for sphenoid sinusitis. She has been feeling better and her headaches resolved after treatment of her sinusitis    The patient had continued to complain of neck pain shoulder pain and occipital headaches and had an MRI of the C-spine which showed multilevel disc disease and she underwent PT. She has completed Covid vaccines    She will have follow-up and mammogram in November 2021  Continue surveillance on a yearly basis.      Main issue is migraine headaches and she follows at the pain clinic  MRI brain was negative  MRI of C cervical spine showed   Multilevel degenerative disc disease with uncovertebral and facet hypertrophy   resulting in canal stenosis at C3-4 and C5-6. Bilateral foraminal narrowing as described above. She will have follow up mammogram in [facility]. Tiffanie Doll M.D., F.A.C.P.   Electronically signed 10/10/2022 at 12:09 PM

## 2022-10-13 DIAGNOSIS — Z12.31 SCREENING MAMMOGRAM FOR HIGH-RISK PATIENT: Primary | ICD-10-CM

## 2022-11-02 ENCOUNTER — HOSPITAL ENCOUNTER (OUTPATIENT)
Age: 67
Setting detail: OBSERVATION
Discharge: HOME | End: 2022-11-03
Attending: EMERGENCY MEDICINE | Admitting: INTERNAL MEDICINE
Payer: MEDICARE

## 2022-11-02 ENCOUNTER — APPOINTMENT (OUTPATIENT)
Dept: CT IMAGING | Age: 67
End: 2022-11-02
Payer: MEDICARE

## 2022-11-02 DIAGNOSIS — R29.90 STROKE-LIKE SYMPTOMS: Primary | ICD-10-CM

## 2022-11-02 PROBLEM — G45.9 TIA (TRANSIENT ISCHEMIC ATTACK): Status: ACTIVE | Noted: 2022-11-02

## 2022-11-02 LAB
ALBUMIN SERPL-MCNC: 4.5 G/DL (ref 3.5–5.2)
ALP BLD-CCNC: 47 U/L (ref 35–104)
ALT SERPL-CCNC: 17 U/L (ref 0–32)
ANION GAP SERPL CALCULATED.3IONS-SCNC: 13 MMOL/L (ref 7–16)
APTT: 29.3 SEC (ref 24.5–35.1)
AST SERPL-CCNC: 22 U/L (ref 0–31)
BACTERIA: ABNORMAL /HPF
BASOPHILS ABSOLUTE: 0.03 E9/L (ref 0–0.2)
BASOPHILS RELATIVE PERCENT: 0.4 % (ref 0–2)
BILIRUB SERPL-MCNC: <0.2 MG/DL (ref 0–1.2)
BILIRUBIN URINE: NEGATIVE
BLOOD, URINE: NEGATIVE
BUN BLDV-MCNC: 11 MG/DL (ref 6–23)
CALCIUM SERPL-MCNC: 10.2 MG/DL (ref 8.6–10.2)
CHLORIDE BLD-SCNC: 102 MMOL/L (ref 98–107)
CHP ED QC CHECK: YES
CLARITY: CLEAR
CO2: 26 MMOL/L (ref 22–29)
COLOR: YELLOW
CREAT SERPL-MCNC: 0.8 MG/DL (ref 0.5–1)
EOSINOPHILS ABSOLUTE: 0.16 E9/L (ref 0.05–0.5)
EOSINOPHILS RELATIVE PERCENT: 2.1 % (ref 0–6)
EPITHELIAL CELLS, UA: ABNORMAL /HPF
GFR SERPL CREATININE-BSD FRML MDRD: >60 ML/MIN/1.73
GLUCOSE BLD-MCNC: 101 MG/DL
GLUCOSE BLD-MCNC: 97 MG/DL (ref 74–99)
GLUCOSE URINE: NEGATIVE MG/DL
HCT VFR BLD CALC: 41.3 % (ref 34–48)
HEMOGLOBIN: 14 G/DL (ref 11.5–15.5)
IMMATURE GRANULOCYTES #: 0.02 E9/L
IMMATURE GRANULOCYTES %: 0.3 % (ref 0–5)
INR BLD: 1.1
KETONES, URINE: NEGATIVE MG/DL
LEUKOCYTE ESTERASE, URINE: ABNORMAL
LYMPHOCYTES ABSOLUTE: 2.75 E9/L (ref 1.5–4)
LYMPHOCYTES RELATIVE PERCENT: 35.6 % (ref 20–42)
MCH RBC QN AUTO: 33.9 PG (ref 26–35)
MCHC RBC AUTO-ENTMCNC: 33.9 % (ref 32–34.5)
MCV RBC AUTO: 100 FL (ref 80–99.9)
METER GLUCOSE: 101 MG/DL (ref 74–99)
MONOCYTES ABSOLUTE: 0.71 E9/L (ref 0.1–0.95)
MONOCYTES RELATIVE PERCENT: 9.2 % (ref 2–12)
NEUTROPHILS ABSOLUTE: 4.05 E9/L (ref 1.8–7.3)
NEUTROPHILS RELATIVE PERCENT: 52.4 % (ref 43–80)
NITRITE, URINE: NEGATIVE
PDW BLD-RTO: 13 FL (ref 11.5–15)
PH UA: 5.5 (ref 5–9)
PLATELET # BLD: 258 E9/L (ref 130–450)
PMV BLD AUTO: 9.7 FL (ref 7–12)
POTASSIUM SERPL-SCNC: 4.9 MMOL/L (ref 3.5–5)
PROTEIN UA: NEGATIVE MG/DL
PROTHROMBIN TIME: 11.6 SEC (ref 9.3–12.4)
RBC # BLD: 4.13 E12/L (ref 3.5–5.5)
RBC UA: ABNORMAL /HPF (ref 0–2)
SODIUM BLD-SCNC: 141 MMOL/L (ref 132–146)
SPECIFIC GRAVITY UA: <=1.005 (ref 1–1.03)
TOTAL PROTEIN: 7.5 G/DL (ref 6.4–8.3)
TROPONIN, HIGH SENSITIVITY: <6 NG/L (ref 0–9)
UROBILINOGEN, URINE: 0.2 E.U./DL
WBC # BLD: 7.7 E9/L (ref 4.5–11.5)
WBC UA: ABNORMAL /HPF (ref 0–5)

## 2022-11-02 PROCEDURE — 85730 THROMBOPLASTIN TIME PARTIAL: CPT

## 2022-11-02 PROCEDURE — 70450 CT HEAD/BRAIN W/O DYE: CPT

## 2022-11-02 PROCEDURE — G0378 HOSPITAL OBSERVATION PER HR: HCPCS

## 2022-11-02 PROCEDURE — 72125 CT NECK SPINE W/O DYE: CPT

## 2022-11-02 PROCEDURE — 6370000000 HC RX 637 (ALT 250 FOR IP): Performed by: EMERGENCY MEDICINE

## 2022-11-02 PROCEDURE — 81001 URINALYSIS AUTO W/SCOPE: CPT

## 2022-11-02 PROCEDURE — 93005 ELECTROCARDIOGRAM TRACING: CPT | Performed by: PHYSICIAN ASSISTANT

## 2022-11-02 PROCEDURE — 84484 ASSAY OF TROPONIN QUANT: CPT

## 2022-11-02 PROCEDURE — 85610 PROTHROMBIN TIME: CPT

## 2022-11-02 PROCEDURE — 85025 COMPLETE CBC W/AUTO DIFF WBC: CPT

## 2022-11-02 PROCEDURE — 80053 COMPREHEN METABOLIC PANEL: CPT

## 2022-11-02 PROCEDURE — 99285 EMERGENCY DEPT VISIT HI MDM: CPT

## 2022-11-02 RX ORDER — OXYCODONE HYDROCHLORIDE AND ACETAMINOPHEN 5; 325 MG/1; MG/1
1 TABLET ORAL EVERY 6 HOURS PRN
Status: DISCONTINUED | OUTPATIENT
Start: 2022-11-02 | End: 2022-11-03 | Stop reason: HOSPADM

## 2022-11-02 RX ORDER — ASPIRIN 81 MG/1
324 TABLET, CHEWABLE ORAL ONCE
Status: COMPLETED | OUTPATIENT
Start: 2022-11-02 | End: 2022-11-02

## 2022-11-02 RX ADMIN — OXYCODONE AND ACETAMINOPHEN 1 TABLET: 5; 325 TABLET ORAL at 19:37

## 2022-11-02 RX ADMIN — ASPIRIN 81 MG CHEWABLE TABLET 324 MG: 81 TABLET CHEWABLE at 19:37

## 2022-11-02 ASSESSMENT — ENCOUNTER SYMPTOMS
BACK PAIN: 0
NAUSEA: 0
VOMITING: 0
ABDOMINAL DISTENTION: 0
COUGH: 0
SHORTNESS OF BREATH: 0
DIARRHEA: 0
ABDOMINAL PAIN: 0
CHEST TIGHTNESS: 0
SORE THROAT: 0

## 2022-11-02 ASSESSMENT — LIFESTYLE VARIABLES
HOW MANY STANDARD DRINKS CONTAINING ALCOHOL DO YOU HAVE ON A TYPICAL DAY: PATIENT DOES NOT DRINK
HOW OFTEN DO YOU HAVE A DRINK CONTAINING ALCOHOL: NEVER

## 2022-11-02 ASSESSMENT — PAIN - FUNCTIONAL ASSESSMENT: PAIN_FUNCTIONAL_ASSESSMENT: NONE - DENIES PAIN

## 2022-11-02 NOTE — ED NOTES
Patient was given meal tray and drink, medication administered. Now resting comfortably in room.      Anabel Olmstead RN  11/02/22 [id number]

## 2022-11-02 NOTE — ED PROVIDER NOTES
HPI     Patient is a 77 y.o. female presents with a chief complaint of left sided numbness  This has been occurring for over a week  Patient states that it gets better with nothing. Patient states that it gets worse with nothing. Patient states that it is moderate in severity. Patient states it was acute in onset. Patient states that she has been having left-sided facial numbness and left-sided numbness in her arms and legs over the past week. Has been going on and off. Patient also has had this for at least since yesterday morning. Patient denies any fevers or chills. Patient denies any chest pain or shortness of breath. Patient denies any abdominal pain. Review of Systems   Constitutional:  Negative for activity change, appetite change, chills, fatigue and fever. HENT:  Negative for congestion, drooling and sore throat. Respiratory:  Negative for cough, chest tightness and shortness of breath. Cardiovascular:  Negative for chest pain and palpitations. Gastrointestinal:  Negative for abdominal distention, abdominal pain, diarrhea, nausea and vomiting. Genitourinary:  Negative for decreased urine volume, difficulty urinating, enuresis, flank pain, frequency and hematuria. Musculoskeletal:  Negative for arthralgias, back pain and neck stiffness. Skin:  Negative for rash and wound. Neurological:  Positive for numbness. Negative for dizziness, facial asymmetry, light-headedness and headaches. Psychiatric/Behavioral:  Negative for agitation, confusion and decreased concentration. Physical Exam  Vitals and nursing note reviewed. Constitutional:       Appearance: She is well-developed. HENT:      Head: Normocephalic and atraumatic. Eyes:      Conjunctiva/sclera: Conjunctivae normal.   Cardiovascular:      Rate and Rhythm: Normal rate and regular rhythm. Heart sounds: Normal heart sounds. No murmur heard.   Pulmonary:      Effort: Pulmonary effort is normal. No respiratory distress. Breath sounds: Normal breath sounds. No wheezing or rales. Abdominal:      General: Bowel sounds are normal.      Palpations: Abdomen is soft. Tenderness: There is no abdominal tenderness. There is no guarding or rebound. Musculoskeletal:      Cervical back: Normal range of motion and neck supple. Skin:     General: Skin is warm and dry. Neurological:      Mental Status: She is alert and oriented to person, place, and time. Cranial Nerves: No cranial nerve deficit. Coordination: Coordination normal.      Comments: Loss of sensation on the left. Procedures     MDM     Amount and/or Complexity of Data Reviewed  Clinical lab tests: reviewed  Tests in the radiology section of CPT®: reviewed         ED Course as of 11/02/22 1726   Wed Nov 02, 2022 1719 ED read interpreted by me. Rate 52 bpm.  Normal axis. Normal IA normal.  Normal QRS. No ST elevations or depressions. Compared to prior EKG with no significant changes. Sinus bradycardia. [JM]      ED Course User Index  [JM] Garrett Banegas MD      Patient is a 77 y.o. female presenting with left-sided weakness. Patient has an NIH of 1 only with left-sided sensation loss but still felt sensation. Patient denies any chest pain or shortness of breath. Had a CT of the head. CT of the head was negative. Patient's lab work was benign. Patient was given Plavix here. Patient was then admitted to internal medicine. Patient was seen and evaluated by myself and my attending Melchor Patterson MD. Assessment and Plan discussed with attending provider, please see attestation for final plan of care. This note was done using dictation software and there may be some grammatical errors associated with this. Garrett Banegas MD    ED Course as of 11/05/22 0656   Wed Nov 02, 2022 1719 ED read interpreted by me. Rate 52 bpm.  Normal axis. Normal IA normal.  Normal QRS. No ST elevations or depressions.   Compared to prior EKG with no significant changes. Sinus bradycardia. [JM]      ED Course User Index  [ARINA] Funmi Ware MD       --------------------------------------------- PAST HISTORY ---------------------------------------------  Past Medical History:  has a past medical history of Arthritis, Asthma, Bipolar depression ([facility] Breast cancer ([facility] Bronchitis, Colitis, [facility] Diabetic peripheral neuropathy ([facility] Diverticulitis, Fatty liver, Fibromyalgia, GERD (gastroesophageal reflux disease), Herpes, Hiatal hernia, Hyperlipidemia, Hypertension, Migraines, Osteopenia, Pancreatitis, Pericarditis, Pneumonia, Stomach ulcer, and TIA (transient ischemic attack). Past Surgical History:  has a past surgical history that includes Cholecystectomy; Endoscopy, colon, diagnostic; Hysterectomy (1985); skin biopsy; Rectocele repair; other surgical history; Cataract removal with implant (Left, 2/26/13); Cataract removal with implant (Right, 3 5 13); Meckel's diverticulum excision; hernia repair; Appendectomy; Ovary surgery (Bilateral, 1986); Cystoscopy (11/04/2016); Nerve Block (Left, 05/09/2017); Nerve Block (Left, 05/23/2017); cystoscopy w biopsy of bladder (N/A, 2/25/2019); Finger trigger release (Right, 7/16/2019); Shoulder arthroscopy (Right, 8/30/2019); Cardiac catheterization; Breast lumpectomy (Left, 10/14/2015); and Abdominal exploration surgery (2010). Social History:  reports that she has never smoked. She has never used smokeless tobacco. She reports that she does not drink alcohol and does not use drugs. Family History: family history includes Breast Cancer in her paternal aunt and paternal grandmother; Heart Disease in her mother; Kidney Disease in her mother; Stroke in her father. The patients home medications have been reviewed.     Allergies: Codeine, Macrobid [nitrofurantoin monohydrate macrocrystals], Pcn [penicillins], Neosporin [neomycin-polymyx-gramicid], Sulfa antibiotics, and Ultram [tramadol hcl]    -------------------------------------------------- RESULTS -------------------------------------------------    LABS:  Results for orders placed or performed during the hospital encounter of 11/02/22   CBC with Auto Differential   Result Value Ref Range    WBC 7.7 4.5 - 11.5 E9/L    RBC 4.13 3.50 - 5.50 E12/L    Hemoglobin 14.0 11.5 - 15.5 g/dL    Hematocrit 41.3 34.0 - 48.0 %    .0 (H) 80.0 - 99.9 fL    MCH 33.9 26.0 - 35.0 pg    MCHC 33.9 32.0 - 34.5 %    RDW 13.0 11.5 - 15.0 fL    Platelets 534 706 - 652 E9/L    MPV 9.7 7.0 - 12.0 fL    Neutrophils % 52.4 43.0 - 80.0 %    Immature Granulocytes % 0.3 0.0 - 5.0 %    Lymphocytes % 35.6 20.0 - 42.0 %    Monocytes % 9.2 2.0 - 12.0 %    Eosinophils % 2.1 0.0 - 6.0 %    Basophils % 0.4 0.0 - 2.0 %    Neutrophils Absolute 4.05 1.80 - 7.30 E9/L    Immature Granulocytes # 0.02 E9/L    Lymphocytes Absolute 2.75 1.50 - 4.00 E9/L    Monocytes Absolute 0.71 0.10 - 0.95 E9/L    Eosinophils Absolute 0.16 0.05 - 0.50 E9/L    Basophils Absolute 0.03 0.00 - 0.20 E9/L   Comprehensive Metabolic Panel   Result Value Ref Range    Sodium 141 132 - 146 mmol/L    Potassium 4.9 3.5 - 5.0 mmol/L    Chloride 102 98 - 107 mmol/L    CO2 26 22 - 29 mmol/L    Anion Gap 13 7 - 16 mmol/L    Glucose 97 74 - 99 mg/dL    BUN 11 6 - 23 mg/dL    Creatinine 0.8 0.5 - 1.0 mg/dL    Est, Glom Filt Rate >60 >=60 mL/min/1.73    Calcium 10.2 8.6 - 10.2 mg/dL    Total Protein 7.5 6.4 - 8.3 g/dL    Albumin 4.5 3.5 - 5.2 g/dL    Total Bilirubin <0.2 0.0 - 1.2 mg/dL    Alkaline Phosphatase 47 35 - 104 U/L    ALT 17 0 - 32 U/L    AST 22 0 - 31 U/L   Troponin   Result Value Ref Range    Troponin, High Sensitivity <6 0 - 9 ng/L   Protime-INR   Result Value Ref Range    Protime 11.6 9.3 - 12.4 sec    INR 1.1    APTT   Result Value Ref Range    aPTT 29.3 24.5 - 35.1 sec   Urinalysis with Microscopic   Result Value Ref Range    Color, UA Yellow Straw/Yellow    Clarity, UA Clear Clear Glucose, Ur Negative Negative mg/dL    Bilirubin Urine Negative Negative    Ketones, Urine Negative Negative mg/dL    Specific Gravity, UA <=1.005 1.005 - 1.030    Blood, Urine Negative Negative    pH, UA 5.5 5.0 - 9.0    Protein, UA Negative Negative mg/dL    Urobilinogen, Urine 0.2 <2.0 E.U./dL    Nitrite, Urine Negative Negative    Leukocyte Esterase, Urine MODERATE (A) Negative    WBC, UA 1-3 0 - 5 /HPF    RBC, UA NONE 0 - 2 /HPF    Epithelial Cells, UA FEW /HPF    Bacteria, UA FEW (A) None Seen /HPF   Basic Metabolic Panel w/ Reflex to MG   Result Value Ref Range    Sodium 137 132 - 146 mmol/L    Potassium reflex Magnesium 4.1 3.5 - 5.0 mmol/L    Chloride 99 98 - 107 mmol/L    CO2 27 22 - 29 mmol/L    Anion Gap 11 7 - 16 mmol/L    Glucose 131 (H) 74 - 99 mg/dL    BUN 14 6 - 23 mg/dL    Creatinine 0.9 0.5 - 1.0 mg/dL    Est, Glom Filt Rate >60 >=60 mL/min/1.73    Calcium 9.5 8.6 - 10.2 mg/dL   CBC with Auto Differential   Result Value Ref Range    WBC 6.6 4.5 - 11.5 E9/L    RBC 3.96 3.50 - 5.50 E12/L    Hemoglobin 13.3 11.5 - 15.5 g/dL    Hematocrit 39.1 34.0 - 48.0 %    MCV 98.7 80.0 - 99.9 fL    MCH 33.6 26.0 - 35.0 pg    MCHC 34.0 32.0 - 34.5 %    RDW 13.0 11.5 - 15.0 fL    Platelets 068 696 - 569 E9/L    MPV 9.4 7.0 - 12.0 fL    Neutrophils % 53.5 43.0 - 80.0 %    Immature Granulocytes % 0.2 0.0 - 5.0 %    Lymphocytes % 33.1 20.0 - 42.0 %    Monocytes % 10.7 2.0 - 12.0 %    Eosinophils % 2.0 0.0 - 6.0 %    Basophils % 0.5 0.0 - 2.0 %    Neutrophils Absolute 3.56 1.80 - 7.30 E9/L    Immature Granulocytes # 0.01 E9/L    Lymphocytes Absolute 2.20 1.50 - 4.00 E9/L    Monocytes Absolute 0.71 0.10 - 0.95 E9/L    Eosinophils Absolute 0.13 0.05 - 0.50 E9/L    Basophils Absolute 0.03 0.00 - 0.20 E9/L   Hemoglobin A1C   Result Value Ref Range    Hemoglobin A1C 6.7 (H) 4.0 - 5.6 %   Lipid, Fasting   Result Value Ref Range    Cholesterol, Fasting 136 0 - 199 mg/dL    Triglyceride, Fasting 199 (H) 0 - 149 mg/dL    HDL 41 >40 mg/dL    LDL Calculated 55 0 - 99 mg/dL    VLDL Cholesterol Calculated 40 mg/dL   POCT Glucose   Result Value Ref Range    Glucose 101 mg/dL    QC OK? yes    POCT Glucose   Result Value Ref Range    Meter Glucose 101 (H) 74 - 99 mg/dL   POCT Glucose   Result Value Ref Range    Meter Glucose 122 (H) 74 - 99 mg/dL   POCT Glucose   Result Value Ref Range    Meter Glucose 115 (H) 74 - 99 mg/dL   EKG 12 Lead   Result Value Ref Range    Ventricular Rate 52 BPM    Atrial Rate 52 BPM    P-R Interval 186 ms    QRS Duration 90 ms    Q-T Interval 436 ms    QTc Calculation (Bazett) 405 ms    P Axis 41 degrees    R Axis 2 degrees    T Axis 32 degrees       RADIOLOGY:  US CAROTID ARTERY BILATERAL   Final Result   Atherosclerotic disease. No hemodynamically significant stenosis is   identified   Estimated stenosis by NASCET criteria in the proximal right carotid   artery is between 0% and 49%. Estimated stenosis by NASCET criteria in the proximal left carotid   artery is between 0% and 49%. MRI BRAIN WO CONTRAST   Final Result   1. No acute intracranial abnormality. No acute infarct. 2. Mild global parenchymal volume loss with minimal chronic microvascular   ischemic changes. 3. Trace right mastoid effusion. CT HEAD WO CONTRAST   Final Result   No acute intracranial abnormality. CT CERVICAL SPINE WO CONTRAST   Final Result   No acute abnormality of the cervical spine. Multilevel degenerative changes. Grade 1 anterolisthesis T1 over T2.                 ------------------------- NURSING NOTES AND VITALS REVIEWED ---------------------------  Date / Time Roomed:  11/2/2022  4:23 PM  ED Bed Assignment:  [id number]    The nursing notes within the ED encounter and vital signs as below have been reviewed. No data found.     Oxygen Saturation Interpretation: Normal    ------------------------------------------ PROGRESS NOTES ------------------------------------------  Re-evaluation(s): Patients symptoms show no change  Repeat physical examination is not changed    Counseling:  I have spoken with the patient and discussed todays results, in addition to providing specific details for the plan of care and counseling regarding the diagnosis and prognosis. Their questions are answered at this time and they are agreeable with the plan of admission.    --------------------------------- ADDITIONAL PROVIDER NOTES ---------------------------------  Consultations:  Spoke with Dr. Saulo Johnson. Discussed case. They will admit the patient. This patient's ED course included: a personal history and physicial examination, re-evaluation prior to disposition, and multiple bedside re-evaluations    This patient has remained hemodynamically stable during their ED course. Diagnosis:  1.  Stroke-like symptoms        Disposition:  Patient's disposition: Admit to telemetry  Patient's condition is Stable           Any Palm MD  Resident  11/05/22 [id number]

## 2022-11-02 NOTE — ED NOTES
Pt having left leg numbness now as well. Charge nurse notified for bed. No bed available in ED at this time.       Mickie Chan RN  11/02/22 [id number]

## 2022-11-03 ENCOUNTER — APPOINTMENT (OUTPATIENT)
Dept: ULTRASOUND IMAGING | Age: 67
End: 2022-11-03
Payer: MEDICARE

## 2022-11-03 ENCOUNTER — APPOINTMENT (OUTPATIENT)
Dept: MRI IMAGING | Age: 67
End: 2022-11-03
Payer: MEDICARE

## 2022-11-03 VITALS
OXYGEN SATURATION: 95 % | DIASTOLIC BLOOD PRESSURE: 70 MMHG | WEIGHT: 145.8 LBS | HEART RATE: 65 BPM | RESPIRATION RATE: 16 BRPM | SYSTOLIC BLOOD PRESSURE: 115 MMHG | HEIGHT: 66 IN | BODY MASS INDEX: 23.43 KG/M2 | TEMPERATURE: 97.8 F

## 2022-11-03 LAB
ANION GAP SERPL CALCULATED.3IONS-SCNC: 11 MMOL/L (ref 7–16)
BASOPHILS ABSOLUTE: 0.03 E9/L (ref 0–0.2)
BASOPHILS RELATIVE PERCENT: 0.5 % (ref 0–2)
BUN BLDV-MCNC: 14 MG/DL (ref 6–23)
CALCIUM SERPL-MCNC: 9.5 MG/DL (ref 8.6–10.2)
CHLORIDE BLD-SCNC: 99 MMOL/L (ref 98–107)
CHOLESTEROL, FASTING: 136 MG/DL (ref 0–199)
CO2: 27 MMOL/L (ref 22–29)
CREAT SERPL-MCNC: 0.9 MG/DL (ref 0.5–1)
EOSINOPHILS ABSOLUTE: 0.13 E9/L (ref 0.05–0.5)
EOSINOPHILS RELATIVE PERCENT: 2 % (ref 0–6)
GFR SERPL CREATININE-BSD FRML MDRD: >60 ML/MIN/1.73
GLUCOSE BLD-MCNC: 131 MG/DL (ref 74–99)
HBA1C MFR BLD: 6.7 % (ref 4–5.6)
HCT VFR BLD CALC: 39.1 % (ref 34–48)
HDLC SERPL-MCNC: 41 MG/DL
HEMOGLOBIN: 13.3 G/DL (ref 11.5–15.5)
IMMATURE GRANULOCYTES #: 0.01 E9/L
IMMATURE GRANULOCYTES %: 0.2 % (ref 0–5)
LDL CHOLESTEROL CALCULATED: 55 MG/DL (ref 0–99)
LYMPHOCYTES ABSOLUTE: 2.2 E9/L (ref 1.5–4)
LYMPHOCYTES RELATIVE PERCENT: 33.1 % (ref 20–42)
MCH RBC QN AUTO: 33.6 PG (ref 26–35)
MCHC RBC AUTO-ENTMCNC: 34 % (ref 32–34.5)
MCV RBC AUTO: 98.7 FL (ref 80–99.9)
METER GLUCOSE: 115 MG/DL (ref 74–99)
METER GLUCOSE: 122 MG/DL (ref 74–99)
MONOCYTES ABSOLUTE: 0.71 E9/L (ref 0.1–0.95)
MONOCYTES RELATIVE PERCENT: 10.7 % (ref 2–12)
NEUTROPHILS ABSOLUTE: 3.56 E9/L (ref 1.8–7.3)
NEUTROPHILS RELATIVE PERCENT: 53.5 % (ref 43–80)
PDW BLD-RTO: 13 FL (ref 11.5–15)
PLATELET # BLD: 236 E9/L (ref 130–450)
PMV BLD AUTO: 9.4 FL (ref 7–12)
POTASSIUM REFLEX MAGNESIUM: 4.1 MMOL/L (ref 3.5–5)
RBC # BLD: 3.96 E12/L (ref 3.5–5.5)
SODIUM BLD-SCNC: 137 MMOL/L (ref 132–146)
TRIGLYCERIDE, FASTING: 199 MG/DL (ref 0–149)
VLDLC SERPL CALC-MCNC: 40 MG/DL
WBC # BLD: 6.6 E9/L (ref 4.5–11.5)

## 2022-11-03 PROCEDURE — 96372 THER/PROPH/DIAG INJ SC/IM: CPT

## 2022-11-03 PROCEDURE — 6370000000 HC RX 637 (ALT 250 FOR IP): Performed by: INTERNAL MEDICINE

## 2022-11-03 PROCEDURE — 83036 HEMOGLOBIN GLYCOSYLATED A1C: CPT

## 2022-11-03 PROCEDURE — 2580000003 HC RX 258: Performed by: NURSE PRACTITIONER

## 2022-11-03 PROCEDURE — 85025 COMPLETE CBC W/AUTO DIFF WBC: CPT

## 2022-11-03 PROCEDURE — G0378 HOSPITAL OBSERVATION PER HR: HCPCS

## 2022-11-03 PROCEDURE — 92610 EVALUATE SWALLOWING FUNCTION: CPT

## 2022-11-03 PROCEDURE — 93880 EXTRACRANIAL BILAT STUDY: CPT

## 2022-11-03 PROCEDURE — 80048 BASIC METABOLIC PNL TOTAL CA: CPT

## 2022-11-03 PROCEDURE — 6360000002 HC RX W HCPCS: Performed by: NURSE PRACTITIONER

## 2022-11-03 PROCEDURE — 70551 MRI BRAIN STEM W/O DYE: CPT

## 2022-11-03 PROCEDURE — 6370000000 HC RX 637 (ALT 250 FOR IP): Performed by: EMERGENCY MEDICINE

## 2022-11-03 PROCEDURE — 80061 LIPID PANEL: CPT

## 2022-11-03 PROCEDURE — 82962 GLUCOSE BLOOD TEST: CPT

## 2022-11-03 PROCEDURE — 93880 EXTRACRANIAL BILAT STUDY: CPT | Performed by: RADIOLOGY

## 2022-11-03 PROCEDURE — 36415 COLL VENOUS BLD VENIPUNCTURE: CPT

## 2022-11-03 PROCEDURE — 6370000000 HC RX 637 (ALT 250 FOR IP): Performed by: NURSE PRACTITIONER

## 2022-11-03 RX ORDER — SUCRALFATE 1 G/1
1 TABLET ORAL DAILY
Status: DISCONTINUED | OUTPATIENT
Start: 2022-11-03 | End: 2022-11-03 | Stop reason: HOSPADM

## 2022-11-03 RX ORDER — SERTRALINE HYDROCHLORIDE 100 MG/1
100 TABLET, FILM COATED ORAL NIGHTLY
Status: DISCONTINUED | OUTPATIENT
Start: 2022-11-03 | End: 2022-11-03 | Stop reason: HOSPADM

## 2022-11-03 RX ORDER — PANTOPRAZOLE SODIUM 40 MG/1
40 TABLET, DELAYED RELEASE ORAL
Status: DISCONTINUED | OUTPATIENT
Start: 2022-11-03 | End: 2022-11-03 | Stop reason: HOSPADM

## 2022-11-03 RX ORDER — INSULIN LISPRO 100 [IU]/ML
0-4 INJECTION, SOLUTION INTRAVENOUS; SUBCUTANEOUS NIGHTLY
Status: DISCONTINUED | OUTPATIENT
Start: 2022-11-03 | End: 2022-11-03 | Stop reason: HOSPADM

## 2022-11-03 RX ORDER — ASPIRIN 81 MG/1
81 TABLET, CHEWABLE ORAL DAILY
Status: DISCONTINUED | OUTPATIENT
Start: 2022-11-03 | End: 2022-11-03 | Stop reason: HOSPADM

## 2022-11-03 RX ORDER — OMEGA-3/DHA/EPA/FISH OIL 300-1000MG
1 CAPSULE ORAL 3 TIMES DAILY
Status: DISCONTINUED | OUTPATIENT
Start: 2022-11-03 | End: 2022-11-03 | Stop reason: CLARIF

## 2022-11-03 RX ORDER — MONTELUKAST SODIUM 10 MG/1
10 TABLET ORAL NIGHTLY
Status: DISCONTINUED | OUTPATIENT
Start: 2022-11-03 | End: 2022-11-03 | Stop reason: HOSPADM

## 2022-11-03 RX ORDER — BISACODYL 10 MG
10 SUPPOSITORY, RECTAL RECTAL DAILY PRN
Status: DISCONTINUED | OUTPATIENT
Start: 2022-11-03 | End: 2022-11-03 | Stop reason: HOSPADM

## 2022-11-03 RX ORDER — INSULIN LISPRO 100 [IU]/ML
0-4 INJECTION, SOLUTION INTRAVENOUS; SUBCUTANEOUS
Status: DISCONTINUED | OUTPATIENT
Start: 2022-11-03 | End: 2022-11-03 | Stop reason: HOSPADM

## 2022-11-03 RX ORDER — BUPROPION HYDROCHLORIDE 150 MG/1
150 TABLET, EXTENDED RELEASE ORAL DAILY
Status: DISCONTINUED | OUTPATIENT
Start: 2022-11-03 | End: 2022-11-03 | Stop reason: HOSPADM

## 2022-11-03 RX ORDER — ENOXAPARIN SODIUM 100 MG/ML
40 INJECTION SUBCUTANEOUS DAILY
Status: DISCONTINUED | OUTPATIENT
Start: 2022-11-03 | End: 2022-11-03 | Stop reason: HOSPADM

## 2022-11-03 RX ORDER — MULTIVITAMIN WITH IRON
1 TABLET ORAL DAILY
Status: DISCONTINUED | OUTPATIENT
Start: 2022-11-03 | End: 2022-11-03 | Stop reason: HOSPADM

## 2022-11-03 RX ORDER — SODIUM CHLORIDE 0.9 % (FLUSH) 0.9 %
5-40 SYRINGE (ML) INJECTION EVERY 12 HOURS SCHEDULED
Status: DISCONTINUED | OUTPATIENT
Start: 2022-11-03 | End: 2022-11-03 | Stop reason: HOSPADM

## 2022-11-03 RX ORDER — ACETAMINOPHEN 650 MG/1
650 SUPPOSITORY RECTAL EVERY 6 HOURS PRN
Status: DISCONTINUED | OUTPATIENT
Start: 2022-11-03 | End: 2022-11-03 | Stop reason: HOSPADM

## 2022-11-03 RX ORDER — ACETAMINOPHEN 325 MG/1
650 TABLET ORAL EVERY 6 HOURS PRN
Status: DISCONTINUED | OUTPATIENT
Start: 2022-11-03 | End: 2022-11-03 | Stop reason: HOSPADM

## 2022-11-03 RX ORDER — PROPRANOLOL HYDROCHLORIDE 20 MG/1
20 TABLET ORAL EVERY 12 HOURS PRN
Status: DISCONTINUED | OUTPATIENT
Start: 2022-11-03 | End: 2022-11-03 | Stop reason: HOSPADM

## 2022-11-03 RX ORDER — SUMATRIPTAN 50 MG/1
50 TABLET, FILM COATED ORAL ONCE
Status: COMPLETED | OUTPATIENT
Start: 2022-11-03 | End: 2022-11-03

## 2022-11-03 RX ORDER — VALACYCLOVIR HYDROCHLORIDE 1 G/1
1000 TABLET, FILM COATED ORAL DAILY
Status: DISCONTINUED | OUTPATIENT
Start: 2022-11-03 | End: 2022-11-03 | Stop reason: HOSPADM

## 2022-11-03 RX ORDER — LOSARTAN POTASSIUM 25 MG/1
25 TABLET ORAL DAILY
Status: DISCONTINUED | OUTPATIENT
Start: 2022-11-03 | End: 2022-11-03 | Stop reason: HOSPADM

## 2022-11-03 RX ORDER — DEXTROSE MONOHYDRATE 100 MG/ML
INJECTION, SOLUTION INTRAVENOUS CONTINUOUS PRN
Status: DISCONTINUED | OUTPATIENT
Start: 2022-11-03 | End: 2022-11-03 | Stop reason: HOSPADM

## 2022-11-03 RX ORDER — SODIUM CHLORIDE 0.9 % (FLUSH) 0.9 %
5-40 SYRINGE (ML) INJECTION PRN
Status: DISCONTINUED | OUTPATIENT
Start: 2022-11-03 | End: 2022-11-03 | Stop reason: HOSPADM

## 2022-11-03 RX ORDER — LOXAPINE SUCCINATE 10 MG/1
10 TABLET ORAL NIGHTLY
Status: DISCONTINUED | OUTPATIENT
Start: 2022-11-03 | End: 2022-11-03 | Stop reason: HOSPADM

## 2022-11-03 RX ORDER — SODIUM CHLORIDE 9 MG/ML
INJECTION, SOLUTION INTRAVENOUS PRN
Status: DISCONTINUED | OUTPATIENT
Start: 2022-11-03 | End: 2022-11-03 | Stop reason: HOSPADM

## 2022-11-03 RX ORDER — ATORVASTATIN CALCIUM 10 MG/1
10 TABLET, FILM COATED ORAL NIGHTLY
Status: DISCONTINUED | OUTPATIENT
Start: 2022-11-03 | End: 2022-11-03 | Stop reason: HOSPADM

## 2022-11-03 RX ORDER — ASCORBIC ACID 500 MG
500 TABLET ORAL 2 TIMES DAILY
Status: DISCONTINUED | OUTPATIENT
Start: 2022-11-03 | End: 2022-11-03 | Stop reason: HOSPADM

## 2022-11-03 RX ADMIN — ACETAMINOPHEN 650 MG: 325 TABLET, FILM COATED ORAL at 09:41

## 2022-11-03 RX ADMIN — MULTIVITAMIN TABLET 1 TABLET: TABLET at 09:41

## 2022-11-03 RX ADMIN — BUPROPION HYDROCHLORIDE 150 MG: 150 TABLET, EXTENDED RELEASE ORAL at 09:41

## 2022-11-03 RX ADMIN — LOSARTAN POTASSIUM 25 MG: 25 TABLET, FILM COATED ORAL at 09:41

## 2022-11-03 RX ADMIN — SUCRALFATE 1 G: 1 TABLET ORAL at 09:41

## 2022-11-03 RX ADMIN — PANTOPRAZOLE SODIUM 40 MG: 40 TABLET, DELAYED RELEASE ORAL at 05:40

## 2022-11-03 RX ADMIN — ENOXAPARIN SODIUM 40 MG: 100 INJECTION SUBCUTANEOUS at 09:44

## 2022-11-03 RX ADMIN — SODIUM CHLORIDE, PRESERVATIVE FREE 10 ML: 5 INJECTION INTRAVENOUS at 09:45

## 2022-11-03 RX ADMIN — OXYCODONE AND ACETAMINOPHEN 1 TABLET: 5; 325 TABLET ORAL at 09:43

## 2022-11-03 RX ADMIN — SUMATRIPTAN SUCCINATE 50 MG: 50 TABLET ORAL at 11:11

## 2022-11-03 RX ADMIN — ASPIRIN 81 MG CHEWABLE TABLET 81 MG: 81 TABLET CHEWABLE at 09:41

## 2022-11-03 ASSESSMENT — PAIN DESCRIPTION - ORIENTATION
ORIENTATION: RIGHT;LEFT
ORIENTATION: RIGHT;LEFT

## 2022-11-03 ASSESSMENT — PAIN DESCRIPTION - DESCRIPTORS
DESCRIPTORS: ACHING
DESCRIPTORS: ACHING;SORE;SHARP

## 2022-11-03 ASSESSMENT — PAIN - FUNCTIONAL ASSESSMENT
PAIN_FUNCTIONAL_ASSESSMENT: ACTIVITIES ARE NOT PREVENTED
PAIN_FUNCTIONAL_ASSESSMENT: ACTIVITIES ARE NOT PREVENTED

## 2022-11-03 ASSESSMENT — PAIN SCALES - GENERAL
PAINLEVEL_OUTOF10: 4
PAINLEVEL_OUTOF10: 5

## 2022-11-03 ASSESSMENT — PAIN DESCRIPTION - LOCATION
LOCATION: HEAD
LOCATION: HEAD

## 2022-11-03 NOTE — PATIENT CARE CONFERENCE
P Quality Flow/Interdisciplinary Rounds Progress Note        Quality Flow Rounds held on November 3, 2022    Disciplines Attending:  Bedside Nurse, , , and Nursing Unit Leadership    Amy Ramirez was admitted on 11/2/2022  4:23 PM    Anticipated Discharge Date:       Disposition:    Felipe Score:  Felipe Scale Score: 21    Readmission Risk              Risk of Unplanned Readmission:  0           Discussed patient goal for the day, patient clinical progression, and barriers to discharge.   The following Goal(s) of the Day/Commitment(s) have been identified:  Discharge - Obtain Order      Jose Juan Baird RN  November 3, 2022

## 2022-11-03 NOTE — CARE COORDINATION
Met with pt and spouse to discuss discharge planning/transition of care. Pt is independent, active , no DME. Dr. Vanessa Ji is PCP and [facility] [address] is pharmacy. Spouse to transport home at discharge. No needs/concerns addressed at this time, will continue to follow. Duarte Mart, MSW, LSW

## 2022-11-03 NOTE — ACP (ADVANCE CARE PLANNING)
.Advance Care Planning   Healthcare Decision Maker:    Primary Decision Maker: Scott Marrero - [phone]    Secondary Decision Maker: Dennie Rumonur - Child - [phone]    Click here to complete Healthcare Decision Makers including selection of the Healthcare Decision Maker Relationship (ie \"Primary\").

## 2022-11-03 NOTE — PROGRESS NOTES
Physical Therapy  Facility/Department: [facility]    Name: Priscilla Winter  : 1955  MRN: [id number]  Date of Service: 11/3/2022  The pt was seen sitting EOB, had no strength or balance deficits with ambulation and MMT, no inpatient PT needs noted. Pt will be discharged from PT services at this time. If there is any change in the pt's status please don't hesitate to re-consult. Thank you. Marla Cardona.  Leonid Matthews [address]  number:  [phone]

## 2022-11-03 NOTE — PROGRESS NOTES
SPEECH/LANGUAGE PATHOLOGY  CLINICAL ASSESSMENT OF SWALLOWING FUNCTION   and PLAN OF CARE  PATIENT NAME:  Flor Nichols  (female)     MRN:  [id number]    :  1955  (77 y.o.)  STATUS:  Inpatient: Room 6502/6502-A    TODAY'S DATE:  11/3/2022  11/03/22 0315    SLP swallowing-dysphagia evaluation and treatment  Start:  22,   End:  22,   ONE TIME,   Standing Count:  1 Occurrences,   R         MONIKA Gomez - CNP   REASON FOR REFERRAL: to further assess oropharyngeal function   EVALUATING THERAPIST: LESLYE Mandujano                 ASSESSMENT:    DYSPHAGIA DIAGNOSIS:   Clinical indicators of normal swallow function      DIET RECOMMENDATIONS:  Regular consistency solids (IDDSI level 7) with  thin liquids (IDDSI level 0)     FEEDING RECOMMENDATIONS:     Assistance level:  No assistance needed      Compensatory strategies recommended: No strategies are recommended at this time      Discussed recommendations with nursing?: Yes     SPEECH THERAPY  PLAN OF CARE   The dysphagia POC is established based on physician order, dysphagia diagnosis and results of clinical assessment     Dysphagia therapy is not recommended     Conditions Requiring Skilled Therapeutic Intervention for dysphagia:    not applicable    Specific dysphagia interventions to include:     Not applicable    Specific instructions for next treatment:  not applicable   Patient Treatment Goals:    Short Term Goals:  Not applicable no therapy warranted     Long Term Goals:   Not applicable no therapy warranted      Patient/family Goal:    not applicable                    ADMITTING DIAGNOSIS: TIA (transient ischemic attack) [G45.9]  Stroke-like symptoms [R29.90]    VISIT DIAGNOSIS:   Visit Diagnoses         Codes    Stroke-like symptoms    -  Primary R29.90             PATIENT REPORT/COMPLAINT: denies difficulty swallowing  RN cleared patient for participation in assessment     yes     PRIOR LEVEL OF SWALLOW FUNCTION:    PAST HISTORY OF DYSPHAGIA?: none reported    Home diet: Regular consistency solids (IDDSI level 7) with  thin liquids (IDDSI level 0)    Current Diet Order:  ADULT DIET; Regular; 4 carb choices (60 gm/meal)    PROCEDURE:  Consistencies Administered During the Evaluation   Liquids: thin liquid   Solids:  pureed foods and soft solid foods      Method of Intake:   cup, straw, spoon  Self fed      Position:   Seated, upright    CLINICAL ASSESSMENT  Oral Stage: The oral stage of swallowing was within functional limits      Pharyngeal Stage:    No signs of aspiration were noted during this evaluation however, silent aspiration cannot be ruled out at bedside. If silent aspiration is suspected, a Videofluoroscopic Study of Swallowing (MBS) is recommended and requires a physician order. Cognition:   Within functional limits for this exam    Oral Peripheral Examination   Adequate lingual/labial strength     Current Respiratory Status    room air     Parameters of Speech Production  Respiration:  Adequate for speech production  Quality:   Within functional limits  Intensity: Within functional limits    Volitional Swallow: Present    Volitional Cough:    Present    Pain: No pain reported. EDUCATION:   The Speech Language Pathologist (SLP) completed education regarding results of evaluation and that intervention is not warranted at this time. Learner: Patient  Education:  Reviewed results and recommendations of this evaluation  Evaluation of Education: Verbalizes understanding    This plan will be re-evaluated and revised in 1 week  if warranted. CPT code:  81986  bedside swallow eval      [x]The admitting diagnosis and active problem list, have been reviewed prior to initiation of this evaluation.         ACTIVE PROBLEM LIST:   Patient Active Problem List   Diagnosis    Diabetes mellitus ([facility]    GERD (gastroesophageal reflux disease)    Hypertensive cardiovascular disease    Hyperlipidemia with target LDL less than 100    Bipolar 1 disorder, depressed, full remission ([facility]    Cataract, left eye    Right cataract    Iron deficiency anemia due to chronic blood loss    Chronic pain syndrome    Cervicalgia    Trigger finger of right thumb    Impingement syndrome of right shoulder    Traumatic incomplete tear of right rotator cuff    Remote history of stroke    Vertiginous migraine    TIA (transient ischemic attack)           Linnea Lea M.S., [address] Pathologist  [facility]. [phone]

## 2022-11-03 NOTE — PROGRESS NOTES
Occupational Therapy  Date:11/3/2022  Patient Name: Gabrielle Law  MRN: [id number]  : 1955  Room: [address] [place] [address]-A     OT order received and appreciated. Chart reviewed. Pt ambulated in room and completed all ADLs independently.   Skilled OT evaluation not indicated    Pilar Laguna OTR/L #[id number]

## 2022-11-03 NOTE — PROGRESS NOTES
Pt anxious to go home. Perfectserve to Dr. Logan Ballesteros who states okay for pt to follow up outpatient. Dr. Edenilson Garcia made aware.

## 2022-11-03 NOTE — PLAN OF CARE
Problem: Chronic Conditions and Co-morbidities  Goal: Patient's chronic conditions and co-morbidity symptoms are monitored and maintained or improved  11/3/2022 0510 by Len Logan RN  Outcome: Progressing     Problem: Safety - Adult  Goal: Free from fall injury  11/3/2022 0510 by Len Logan RN  Outcome: Progressing

## 2022-11-03 NOTE — H&P
Hospital Medicine History & Physical      PCP: Briseida Alfaro DO    Date of Admission: 11/2/2022    Date of Service: . NOV 3, 2022    Chief Complaint:  NUMBNESS BOTH SIDES OF HER BODY      History Of Present Illness:     77 y.o. female presented with NUMBNESS BOTH SIDES OF HER BODY, RIGHT FIRST, THEN LEFT, LASTING  SEVERAL HOURS, NO BLURRED VISION, NO DOUBLE VISION, NO MUSCLE WEAKNESS, . SHE  GOT UP OUT OF A CHAIR AND PASSED OUT AND HIT HER HEAD ON THE END OF THE COUCH. NO NV, NO HEADACHE, .  NEVER HAD BEFORE    Past Medical History:          Diagnosis Date    Arthritis     spine    Asthma     Bipolar depression ([address]     Breast cancer ([address] 2010    breast left  ( lumpectomt and radiation)    Bronchitis     Colitis     Diabetes mellitus ([address]     Diabetic peripheral neuropathy (HCC)     of feet    Diverticulitis     Fatty liver     Fibromyalgia     GERD (gastroesophageal reflux disease)     Herpes     herpes 2 genitalia    Hiatal hernia     Hyperlipidemia     Hypertension     Migraines     Osteopenia     Pancreatitis     Pericarditis [id number]    had several episodes during this time span    Pneumonia oct 1991    Stomach ulcer     TIA (transient ischemic attack) 2008    no deficits       Past Surgical History:          Procedure Laterality Date    ABDOMINAL EXPLORATION SURGERY  2010    exploratory for scar tissue    APPENDECTOMY      BREAST LUMPECTOMY Left 10/14/2015    excision palpable left breast lesion     CARDIAC CATHETERIZATION      heart cath many yrs ago states was normal    CATARACT REMOVAL WITH IMPLANT Left 2/26/13    /with PC IOL    CATARACT REMOVAL WITH IMPLANT Right 3 5 13    CHOLECYSTECTOMY      CYSTOSCOPY  11/04/2016    retrograde pylogram/Dr. Federica Zacarias    CYSTOSCOPY W BIOPSY OF BLADDER N/A 2/25/2019    CYSTOSCOPY, RETROGRADE, PYELOGRAM  AND CYSTOGRAM performed by Romeo Hernandez MD at [facility], DIAGNOSTIC      Dr. Joaquina Thomas Right 7/16/2019    TRIGGER FINGER RELEASE RIGHT THUMB performed by Cyndy Moody DO at [facility] ([facility])  [address] [facility] Left 05/09/2017    left ileoinjuinal nerve block #1    NERVE BLOCK Left 05/23/2017    left illioinguinal nerve block #2    OTHER SURGICAL HISTORY      birthmark face and arm    OVARY SURGERY Bilateral 1986    removed    RECTOCELE REPAIR      SHOULDER ARTHROSCOPY Right 8/30/2019    RIGHT SHOULDER ARTHROSCOPY., BICEP TENOLYSIS  AND DEBRIDEMENT LABRUM AND ROTATOR CUFF performed by Cyndy Moody DO at [address] [facility] [address]      mouth & under left breast       Medications Prior to Admission:      Prior to Admission medications    Medication Sig Start Date End Date Taking? Authorizing Provider   methocarbamol (ROBAXIN) 750 MG tablet take 1 tablet by mouth every 12 hours as needed 2/28/22   Historical Provider, MD   Ubrogepant 50 MG TABS Take 50 mg by mouth prn    Historical Provider, MD   diphenoxylate-atropine (LOMOTIL) 2.5-0.025 MG per tablet Take 1 tablet by mouth 4 times daily as needed for Diarrhea. Historical Provider, MD   omeprazole (PRILOSEC) 20 MG delayed release capsule Take 20 mg by mouth daily    Historical Provider, MD   meclizine (ANTIVERT) 25 MG tablet Take 1 tablet by mouth every 8 hours as needed 10/8/20   Historical Provider, MD   oxyCODONE-acetaminophen (PERCOCET) 5-325 MG per tablet Take 1 tablet by mouth every 8 hours as needed for Pain.  7.5 mg 6/10/20   Historical Provider, MD   buPROPion (WELLBUTRIN SR) 150 MG extended release tablet TAKE 1 TABLET BY MOUTH ONCE DAILY 12/9/19   Historical Provider, MD   famotidine (PEPCID) 40 MG tablet TAKE ONE TABLET BY MOUTH EVERY DAY 11/3/19   Historical Provider, MD   aspirin 81 MG tablet Take 81 mg by mouth daily     Historical Provider, MD Dulaglutide (TRULICITY SC) Inject 0.24 mg into the skin every 7 days Q Tuesdays    Historical Provider, MD   loxapine (LOXITANE) 10 MG capsule Take 10 mg by mouth nightly     Historical Provider, MD   B Complex-Biotin-FA (B-50 COMPLEX PO) Take by mouth 2 times daily    Historical Provider, MD   ondansetron (ZOFRAN) 4 MG tablet Take 4 mg by mouth every 8 hours as needed for Nausea or Vomiting    Historical Provider, MD   propranolol (INDERAL) 20 MG tablet Take 20 mg by mouth 2 times daily prn    Historical Provider, MD   sucralfate (CARAFATE) 1 GM tablet Take 1 g by mouth daily     Historical Provider, MD   valACYclovir (VALTREX) 1 G tablet Take 1 g by mouth daily    Historical Provider, MD   montelukast (SINGULAIR) 10 MG tablet Take 10 mg by mouth nightly. Historical Provider, MD   simvastatin (ZOCOR) 20 MG tablet Take 20 mg by mouth every morning (before breakfast)     Historical Provider, MD   ascorbic acid (VITAMIN C) 500 MG tablet Take 500 mg by mouth 2 times daily. Historical Provider, MD   Potassium 99 MG TABS Take 1 capsule by mouth daily. Historical Provider, MD   CALCIUM-VITAMIN D PO Take 1 tablet by mouth daily 1200 mg/ 25mg    Historical Provider, MD   losartan (COZAAR) 25 MG tablet Take 25 mg by mouth daily. Historical Provider, MD   Multiple Vitamin (MULTIVITAMIN PO) Take 1 tablet by mouth once. Historical Provider, MD   fish oil-omega-3 fatty acids 1000 MG capsule Take 1 g by mouth 3 times daily     Historical Provider, MD   sertraline (ZOLOFT) 100 MG tablet Take 100 mg by mouth nightly     Historical Provider, MD       Allergies:  Codeine, Macrobid [nitrofurantoin monohydrate macrocrystals], Pcn [penicillins], Neosporin [neomycin-polymyx-gramicid], Sulfa antibiotics, and Ultram [tramadol hcl]    Social History:      The patient currently lives     TOBACCO:   reports that she has never smoked.  She has never used smokeless tobacco.  ETOH:   reports no history of alcohol use.      Family History:       Reviewed in detail and negative for DM, CAD, Cancer, CVA. Positive as follows:        Problem Relation Age of Onset    Kidney Disease Mother     Heart Disease Mother     Stroke Father     Breast Cancer Paternal Grandmother     Breast Cancer Paternal Aunt        REVIEW OF SYSTEMS:   Pertinent positives as noted in the HPI. All other systems reviewed and negative. PHYSICAL EXAM:    /70   Pulse 65   Temp 97.8 °F (36.6 °C) (Temporal)   Resp 16   Ht 5' 6\" (1.676 m)   Wt 145 lb 12.8 oz (66.1 kg)   SpO2 95%   BMI 23.53 kg/m²     General appearance:  No apparent distress, appears stated age. HEENT:  Normal cephalic, atraumatic without obvious deformity. Pupils equal, round, and reactive to light. Extra ocular muscles intact. Conjunctivae/corneas clear. Neck: Supple, with full range of motion. No jugular venous distention. Trachea midline. Respiratory:  Normal respiratory effort. Clear to auscultation,   Cardiovascular:  RR  Abdomen: Soft, non-tender, non-distended with normal bowel sounds. Musculoskeletal:  No clubbing, cyanosis or edema bilaterally. MUSCLE STRENGTH 5/5 UPPER AND LOWER EXTREM  Skin: smooth and dry   Neurologic:   Cranial nerves: II-XII intact,   Psychiatric:  Alert and oriented x 3        Labs:     Recent Labs     11/02/22 1715 11/03/22  0433   WBC 7.7 6.6   HGB 14.0 13.3   HCT 41.3 39.1    236     Recent Labs     11/02/22  1715 11/03/22  0433    137   K 4.9 4.1    99   CO2 26 27   BUN 11 14   CREATININE 0.8 0.9   CALCIUM 10.2 9.5     Recent Labs     11/02/22  1715   AST 22   ALT 17   BILITOT <0.2   ALKPHOS 47     Recent Labs     11/02/22  1715   INR 1.1     No results for input(s): [place] in the last 72 hours.     Urinalysis:      Lab Results   Component Value Date/Time    NITRU Negative 11/02/2022 05:15 PM    [address] 11/02/2022 05:15 PM    WBCUA 1-3 11/17/2011 01:19 PM    BACTERIA FEW 11/02/2022 05:15 PM    RBCUA NONE 11/02/2022 05:15 PM    RBCUA NONE 10/29/2013 08:15 PM    BLOODU Negative 11/02/2022 05:15 PM    SPECGRAV <=1.005 11/02/2022 05:15 PM    GLUCOSEU Negative 11/02/2022 05:15 PM    GLUCOSEU NEGATIVE 11/17/2011 01:19 PM       Radiology:           US CAROTID ARTERY BILATERAL   Final Result   Atherosclerotic disease. No hemodynamically significant stenosis is   identified   Estimated stenosis by NASCET criteria in the proximal right carotid   artery is between 0% and 49%. Estimated stenosis by NASCET criteria in the proximal left carotid   artery is between 0% and 49%. MRI BRAIN WO CONTRAST   Final Result   1. No acute intracranial abnormality. No acute infarct. 2. Mild global parenchymal volume loss with minimal chronic microvascular   ischemic changes. 3. Trace right mastoid effusion. CT HEAD WO CONTRAST   Final Result   No acute intracranial abnormality. CT CERVICAL SPINE WO CONTRAST   Final Result   No acute abnormality of the cervical spine. Multilevel degenerative changes. Grade 1 anterolisthesis T1 over T2.             ASSESSMENT:    Active Hospital Problems    Diagnosis Date Noted    TIA (transient ischemic attack) [G45.9] 11/02/2022     Priority: Medium   II DM   HTN   HLD      PLAN:  ORTHOSTATIC BLOOD PRESSURE  NEUROLOGY  WANTS TO [address] [facility] AND FU WITH NEUROLOGY AS AN OP  [address] [facility]  Electronically signed by Rick Antonio DO on 11/3/2022 at 3:47 PM Omar       Thank you Rosette Dennis DO for the opportunity to be involved in this patient's care.  If you have any questions or concerns please feel free to contact me at [phone]

## 2022-11-03 NOTE — ED NOTES
Nurse to nurse report called, patient marked ready for transport.      Elieser Culp RN  11/03/22 6407

## 2022-11-03 NOTE — PROGRESS NOTES
Spoke to Dr. Manuela Fletcher. Pt  agreeable discharge if appropriate. Pt states she was told in  ED we do not have her migraine medications and she is having a migraine. Dr. Manuela Fletcher made aware.

## 2022-11-04 LAB
EKG ATRIAL RATE: 52 BPM
EKG P AXIS: 41 DEGREES
EKG P-R INTERVAL: 186 MS
EKG Q-T INTERVAL: 436 MS
EKG QRS DURATION: 90 MS
EKG QTC CALCULATION (BAZETT): 405 MS
EKG R AXIS: 2 DEGREES
EKG T AXIS: 32 DEGREES
EKG VENTRICULAR RATE: 52 BPM

## 2022-11-04 PROCEDURE — 93010 ELECTROCARDIOGRAM REPORT: CPT | Performed by: INTERNAL MEDICINE

## 2022-11-17 ENCOUNTER — HOSPITAL ENCOUNTER (OUTPATIENT)
Dept: MAMMOGRAPHY | Age: 67
Discharge: HOME | End: 2022-11-19
Payer: MEDICARE

## 2022-11-17 DIAGNOSIS — Z12.31 ENCOUNTER FOR SCREENING MAMMOGRAM FOR HIGH-RISK PATIENT: ICD-10-CM

## 2022-11-17 PROCEDURE — 77067 SCR MAMMO BI INCL CAD: CPT

## 2023-02-15 ENCOUNTER — HOSPITAL ENCOUNTER (OUTPATIENT)
Dept: DIABETES SERVICES | Age: 68
Setting detail: THERAPIES SERIES
Discharge: HOME | End: 2023-02-15
Payer: MEDICARE

## 2023-02-15 PROCEDURE — G0108 DIAB MANAGE TRN  PER INDIV: HCPCS

## 2023-02-15 SDOH — ECONOMIC STABILITY: FOOD INSECURITY: ADDITIONAL INFORMATION: NO

## 2023-02-15 ASSESSMENT — PROBLEM AREAS IN DIABETES QUESTIONNAIRE (PAID)
FEELING THAT DIABETES IS TAKING UP TOO MUCH OF YOUR MENTAL AND PHYSICAL ENERGY EVERY DAY: 3
WORRYING ABOUT THE FUTURE AND THE POSSIBILITY OF SERIOUS COMPLICATIONS: 3
COPING WITH COMPLICATIONS OF DIABETES: 2
FEELING SCARED WHEN YOU THINK ABOUT LIVING WITH DIABETES: 1
PAID-5 TOTAL SCORE: 12
FEELING DEPRESSED WHEN YOU THINK ABOUT LIVING WITH DIABETES: 3

## 2023-02-15 NOTE — LETTER
[facility] Diabetes Education Department Diabetes Education Letter    2/15/2023       Re:     Thomasena Duverney         :  1955  Dear Dr. Marisela Boggs: Thank you for referring your patient, Thomasena Duverney, to [facility] diabetes education. Thomasena Duverney has completed their personalized comprehensive education plan. The education plan included the following topics:          Diabetes disease process & treatment   Healthy Eating  Being Active  Taking Medication  Monitoring Glucose  Acute and Chronic Complications  Lifestyle and Healthy coping  Diabetes Distress and Support       In addition, the PAID -5 (Problem Areas in Diabetes) Survey was completed. The results From 2/15/2023 were 12  A total score of 8 or greater indicates possible diabetes related emotional distress which warrants further assessment. [x] [address] and Crisis Resource information provided. Thomasena Duverney selected behavioral goal is:    [x]Healthy Eating  []Monitoring  []Problem Solving  []Being Active  []Taking Medication  []Other: We will send a follow-up letter in 3 months to notify you of their progress. Thank you for referring this patient to our program.  Please do not hesitate to call if you have any questions at [phone] [facility] or [facility]) or [phone] ([facility]).         Sincerely,    [facility] Diabetes Education Department  American Diabetes Association Recognized DSMES Program rev. 10/19/22

## 2023-02-15 NOTE — PROGRESS NOTES
Diabetes Self-Management Education Record    Participant Name: Jessika Posey  Referring Provider: Cora Madison DO  Assessment/Evaluation Ratings:  1=Needs Instruction   4=Demonstrates Understanding/Competency  2=Needs Review   NC=Not Covered    3=Comprehends Key Points  N/A=Not Applicable  Topics/Learning Objectives Pre-session Assess Date:  Instructor initials/date  2/15/23 SE Instr. Date    Instructor initials/date  2/15/23 SE Follow-up Post- session Eval    2/15/23 SE Comments   Diabetes disease process & Treatment process:   -Define type of diabetes in simple terms.  - Describe the ABCs of  diabetes management  -Identify own type of diabetes  -Identify lifestyle changes/treatment options  -other:  1 [x] All     []  []  []  []  []  3    Developing strategies for Healthy coping/psychosocial issues:    -Describe feelings about living with diabetes  -Identify coping strategies and sources of stress  -Identify support needed & support network available  -Complete PAID-5 Diabetes questionnaire 1 [x] All     []  []  []    []  3 Date: 2/15/23 SE  PAID-5 Score: 12  Confidence Score: 2           Prevention, detection & treatment of Chronic complications:    -Identify the prevention, detection and treatment for complications including immunizations, preventive eye, foot, dental and renal exams as indicated per the participant's duration of diabetes and health status.  -Define the natural course of diabetes and the relationship of blood glucose levels to long term complications of diabetes.   1 [x] All     []            []  3    Prevention, detection & treatment of acute complications:    -State the causes,signs & symptoms of hyper & hypoglycemia, and prevention & treatment strategies.   -Describe sick day guidelines  DKA /indications for ketone testing &  when to call physician  1 [x] All     []      []    3              -Identify severe weather/situation crisis  & diabetes supplies management  []      Using medications safely:   -State effects of diabetes medicines on blood glucose levels;  -List diabetes medication taken, action & side effects 1 [] All     []  [x]  3    Insulin/Injectables/glucagon  -Name appropriate injection sites; proper storage; supplies needed;  N/a   []       Demonstrate proper technique  []      Monitoring blood glucose, interpreting and using results:   -Identify the purpose of testing   -Identify recommended & personal blood glucose targets & HgbA1C target levels  -State the Importance of logging blood glucose levels for pattern recognition;   -State benefits of reading/using pt generated health data  -Verbalize safe lancet disposal 1 [] All     [x]  [x]    [x]  [x]  [x]  3 .    -Demonstrate proper testing technique  []      Incorporating physical activity into lifestyle:   -State effect of exercise on blood glucose levels;   -State benefits of regular exercise;   -Define safety considerations/food choices if needed.  -Describe contraindications/maintenance of activity. 1 [x] All     []  []    []  []  3    Incorporating nutritional management into lifestyle:   -Describe effect of type, amount & timing of food on blood glucose  -Describe methods for preparing and planning   healthy meals  -Correctly read food labels for nutritional values  -Name 3 foods high in Carbohydrate  -Plan a sample 4 carbohydrate-controlled meal using Diabetes Plate Method  -Verbalized ability to measure and count carbohydrate gram servings using food labels and carbohydrate food list.    -Plan a carbohydrate-controlled meal based on individual needs/preferences from a Registered Dietitian.   1 [x] All       [x]    [x]    [x]  [x]      [x]      []    3 2/15 23SE  Reviewed nutritional recommendations for salt, added sugar, fats, and fiber. Reviewed plate method and portion control. Reviewed which foods contain carbohydrates, how carbs impact blood glucose, how to count carbs, and how to spread carbs evenly throughout the day.  Label reading reviewed. Patient very attentive to education and demonstrated understanding. Asked questions and took notes. Reviewed general CHO consistency guidelines for meals and snacks and meal time/spacing. Developing strategies for problem solving to promote health/change behavior. -Identify 7 self-care behaviors; Personal health risk factors; Benefits, challenges & strategies for behavioral change and set an individualized goal selection. 1       [x]  3 2/15/23 SE   [x]Nutrition  []Monitoring  []Exercise  []Medication  []Other     Identified Barriers to learning/adherence to self management plan:    None  []  other    Instruction Method:  Lecture/Discussion, Handouts, and Return demonstration     Supporting Education Materials/Equipment Provided: Self-management manual and Nutritional Packet   []Bahraini materials       [] services     []Other:      Encounter Type Date Attended Start Time End Time Comments No Show Dates   Assessment 2/15/23 SE         Session 1         Session 2        1:1 DSMES  2/15/23  1015      In person Follow-up         Gestational Diabetes         DSMES #3        Meter Instrx        Insulin Instrx           Additional Comments: [] Pt seen individually due to Covid-19 Safety precautions and no group session available.         Date:   Follow-up goal attainment based on patients initial DSMES goal    Dr Notified by [] EMR []Fax        []Post class Hgb A1C  []Medication compliance   []Plate method/meal plan compliance   []Able to state the number of Carbohydrate servings eaten at B,L,D   []Testing blood glucose as prescribed by PCP   []Exercise Routine   []Other:   []Other:     []Patient lost to follow-up   Notified by []EMR []Fax

## 2023-05-02 ENCOUNTER — OFFICE VISIT (OUTPATIENT)
Dept: NEUROLOGY | Age: 68
End: 2023-05-02
Payer: MEDICARE

## 2023-05-02 VITALS
HEIGHT: 66 IN | TEMPERATURE: 97.2 F | SYSTOLIC BLOOD PRESSURE: 120 MMHG | HEART RATE: 74 BPM | OXYGEN SATURATION: 91 % | BODY MASS INDEX: 23.78 KG/M2 | DIASTOLIC BLOOD PRESSURE: 73 MMHG | WEIGHT: 148 LBS

## 2023-05-02 DIAGNOSIS — R41.3 MEMORY CHANGE: ICD-10-CM

## 2023-05-02 DIAGNOSIS — R29.818 TRANSIENT NEUROLOGICAL SYMPTOMS: Primary | ICD-10-CM

## 2023-05-02 DIAGNOSIS — G43.009 MIGRAINE WITHOUT AURA AND WITHOUT STATUS MIGRAINOSUS, NOT INTRACTABLE: ICD-10-CM

## 2023-05-02 DIAGNOSIS — R41.841 COGNITIVE COMMUNICATION DEFICIT: ICD-10-CM

## 2023-05-02 PROCEDURE — 1123F ACP DISCUSS/DSCN MKR DOCD: CPT | Performed by: NURSE PRACTITIONER

## 2023-05-02 PROCEDURE — 99205 OFFICE O/P NEW HI 60 MIN: CPT | Performed by: NURSE PRACTITIONER

## 2023-05-02 RX ORDER — INSULIN DEGLUDEC INJECTION 100 U/ML
INJECTION, SOLUTION SUBCUTANEOUS
COMMUNITY
Start: 2023-03-02

## 2023-05-02 RX ORDER — CLOPIDOGREL BISULFATE 75 MG/1
75 TABLET ORAL DAILY
Qty: 90 TABLET | Refills: 3 | Status: SHIPPED | OUTPATIENT
Start: 2023-05-02

## 2023-05-02 RX ORDER — UBROGEPANT 50 MG/1
TABLET ORAL
COMMUNITY

## 2023-05-02 NOTE — PROGRESS NOTES
[address] [facility] MSN, APRN-CNP, THE [facility]     [address], [address]              [place], [address]      [phone]                                    Office New Patient Consult     Debbie Corral is a 79 y.o. right handed female     Past Medical History:     Past Medical History:   Diagnosis Date    Arthritis     spine    Asthma     Bipolar depression ([address]     Breast cancer ([address] 2010    breast left  ( lumpectomt and radiation)    Colitis     Diabetes mellitus ([address]     Diabetic peripheral neuropathy ([address]     of feet    Diverticulitis     Fatty liver     Fibromyalgia     GERD (gastroesophageal reflux disease)     Herpes     herpes 2 genitalia    Hiatal hernia     Hyperlipidemia     Hypertension     Migraines     Osteopenia     Pancreatitis     Pericarditis [id number]    had several episodes during this time span    Pneumonia 10/1991    TIA (transient ischemic attack) 2008    no deficits     Past Surgical History:   Procedure Laterality Date    ABDOMINAL EXPLORATION SURGERY  2010    exploratory for scar tissue    APPENDECTOMY      BREAST LUMPECTOMY Left 10/14/2015    excision palpable left breast lesion     CARDIAC CATHETERIZATION      heart cath many yrs ago states was normal    CATARACT REMOVAL WITH IMPLANT Left 2/26/13    /with PC IOL    CATARACT REMOVAL WITH IMPLANT Right 3 5 13    CHOLECYSTECTOMY      CYSTOSCOPY  11/04/2016    retrograde pylogram/Dr. Sherma Schirmer    CYSTOSCOPY W BIOPSY OF BLADDER N/A 2/25/2019    CYSTOSCOPY, RETROGRADE, PYELOGRAM  AND CYSTOGRAM performed by Amira Samuels MD at [facility], [facility]      Dr. Clau Vitale Right 7/16/2019    TRIGGER FINGER RELEASE RIGHT THUMB performed by Jose Jacques DO at [address] [facility] ([address] Left 05/09/2017    left ileoinjuinal nerve block #1    NERVE BLOCK Left 05/23/2017    left

## 2023-05-03 PROBLEM — G43.009 MIGRAINE WITHOUT AURA AND WITHOUT STATUS MIGRAINOSUS, NOT INTRACTABLE: Status: ACTIVE | Noted: 2023-05-03

## 2023-05-03 PROBLEM — R41.3 MEMORY CHANGE: Status: ACTIVE | Noted: 2023-05-03

## 2023-05-03 PROBLEM — Z86.73 REMOTE HISTORY OF STROKE: Status: RESOLVED | Noted: 2020-11-06 | Resolved: 2023-05-03

## 2023-05-15 ENCOUNTER — HOSPITAL ENCOUNTER (EMERGENCY)
Age: 68
Discharge: HOME | End: 2023-05-15
Attending: STUDENT IN AN ORGANIZED HEALTH CARE EDUCATION/TRAINING PROGRAM
Payer: MEDICARE

## 2023-05-15 ENCOUNTER — APPOINTMENT (OUTPATIENT)
Dept: GENERAL RADIOLOGY | Age: 68
End: 2023-05-15
Payer: MEDICARE

## 2023-05-15 VITALS
OXYGEN SATURATION: 97 % | BODY MASS INDEX: 23.89 KG/M2 | HEART RATE: 64 BPM | SYSTOLIC BLOOD PRESSURE: 108 MMHG | HEIGHT: 66 IN | DIASTOLIC BLOOD PRESSURE: 62 MMHG | TEMPERATURE: 98.1 F | RESPIRATION RATE: 16 BRPM

## 2023-05-15 DIAGNOSIS — R93.7 ABNORMAL BONE XRAY: ICD-10-CM

## 2023-05-15 DIAGNOSIS — M79.671 RIGHT FOOT PAIN: Primary | ICD-10-CM

## 2023-05-15 PROCEDURE — 73630 X-RAY EXAM OF FOOT: CPT

## 2023-05-15 PROCEDURE — 99283 EMERGENCY DEPT VISIT LOW MDM: CPT

## 2023-05-15 ASSESSMENT — PAIN SCALES - GENERAL: PAINLEVEL_OUTOF10: 9

## 2023-05-15 ASSESSMENT — PAIN DESCRIPTION - DESCRIPTORS: DESCRIPTORS: THROBBING

## 2023-05-15 ASSESSMENT — ENCOUNTER SYMPTOMS: COLOR CHANGE: 0

## 2023-05-15 ASSESSMENT — PAIN - FUNCTIONAL ASSESSMENT: PAIN_FUNCTIONAL_ASSESSMENT: 0-10

## 2023-05-15 ASSESSMENT — PAIN DESCRIPTION - ORIENTATION: ORIENTATION: RIGHT

## 2023-05-15 ASSESSMENT — PAIN DESCRIPTION - LOCATION: LOCATION: FOOT

## 2023-05-15 NOTE — ED PROVIDER NOTES
[facility]  ED Provider Note  Department of Emergency Medicine     ED Room:       Written by: Amelia Tapia DO  Patient Name: Josué Romero Date: 5/15/2023  3:20 PM  MRN: [id number]    : 1955        Chief Complaint   Patient presents with    Foot Pain     Pt was walking in house and felt a quick pain in her right foot. No known injury. - Chief complaint    HPI   Olive Herrera is a 79 y.o. female presenting to the ED for evaluation of Foot Pain (Pt was walking in house and felt a quick pain in her right foot. No known injury.)      History obtained from the patient. Patient is a 40-year-old female presenting today via private vehicle for evaluation of right foot pain beginning several hours prior to arrival; patient states she was walking from her living room to her kitchen when she started to feel a quick sharp pain at her right foot near her right pinky toe; she denies any falls or injuries. States the pain is worse if she tries to put weight on it; she denies any wounds, redness or warmth anywhere, numbness or tingling anywhere or swelling. Per chart review in  patient did have a small fracture of the right shaft of the fifth toe and did not have any issues after that. Patient denies any other complaints at this time. Review of Systems   Cardiovascular:  Negative for leg swelling. Musculoskeletal:         Right lateral foot pain, near pinky toe     Skin:  Negative for color change, pallor, rash and wound. Neurological:  Negative for weakness and numbness. All other systems reviewed and are negative. Physical Exam  Vitals and nursing note reviewed. Constitutional:       General: She is not in acute distress. Appearance: She is not toxic-appearing. HENT:      Head: Normocephalic and atraumatic. Right Ear: External ear normal.      Left Ear: External ear normal.      Nose: Nose normal. No rhinorrhea.       Mouth/Throat:      Mouth: Mucous

## 2023-05-17 ENCOUNTER — EVALUATION (OUTPATIENT)
Dept: SPEECH THERAPY | Age: 68
End: 2023-05-17
Payer: MEDICARE

## 2023-05-17 DIAGNOSIS — R41.841 COGNITIVE COMMUNICATION DEFICIT: Primary | ICD-10-CM

## 2023-05-17 PROCEDURE — 96125 COGNITIVE TEST BY HC PRO: CPT | Performed by: SPEECH-LANGUAGE PATHOLOGIST

## 2023-06-20 NOTE — PROGRESS NOTES
[facility]  Outpatient Speech Therapy  Phone: [phone]   Fax:  [phone]    SPEECH/LANGUAGE PATHOLOGY  ROSS INFORMATION PROCESSING ASSESSMENT-2 (RIPA-2)   EVALUATION RESULTS and PLAN OF CARE    PATIENT NAME:  Prerna Fierro  (female)     MRN:  [id number]    :  1955  (79 y.o.)  STATUS:  Outpatient clinic   TODAY'S DATE:  2023  REFERRING PROVIDER:    KRISTAN Cotton       PROVIDER NPI:  [id number]  SPECIFIC PROVIDER ORDER: [facility]  Date of order:  2023  EVALUATING THERAPIST: LESLYE Joseph    CERTIFICATION/RECERTIFICATION PERIOD: 2023 to 23  INSURANCE PROVIDER:  Payor: Soraida Gann / Plan: José Patrick ESSENTIAL/PLUS / Product Type: *No Product type* /    INSURANCE ID:  [id number] - (Medicare Managed)   SECONDARY INSURANCE (if applicable):        CPT Codes  EVALUATION: [id number]  standardized cognitive performance testing (per hour)      REFERRING/TREATMENT DIAGNOSIS: Cognitive communication deficit [R41.841]          SPEECH THERAPY  PLAN OF CARE   The speech therapy  POC is established based on physician order, speech pathology diagnosis and results of clinical assessment     SPEECH PATHOLOGY DIAGNOSIS:    Functional cognitive communication skills    Speech Pathology intervention is not warranted at this time. Conditions Requiring Skilled Therapeutic Intervention for speech, language and/or cognition    Not applicable     Specific Speech Therapy Interventions to Include:   Not applicable    Specific instructions for next treatment:    not applicable    SHORT/LONG TERM GOALS  Not applicable.    Therapy is not recommended    Patient goals: Patient/family involved in developing goals and treatment plan:   Treatment goals discussed with Patient    The Patient understand(s) the diagnosis, prognosis and plan of care   The patient/family Agreed with above,     This plan may be re-evaluated and revised

## 2023-07-25 ENCOUNTER — OFFICE VISIT (OUTPATIENT)
Dept: CARDIOLOGY CLINIC | Age: 68
End: 2023-07-25
Payer: MEDICARE

## 2023-07-25 VITALS
HEART RATE: 71 BPM | RESPIRATION RATE: 16 BRPM | HEIGHT: 66 IN | WEIGHT: 153 LBS | BODY MASS INDEX: 24.59 KG/M2 | DIASTOLIC BLOOD PRESSURE: 62 MMHG | SYSTOLIC BLOOD PRESSURE: 126 MMHG

## 2023-07-25 DIAGNOSIS — F31.9 BIPOLAR DEPRESSION (HCC): ICD-10-CM

## 2023-07-25 DIAGNOSIS — Z85.3 HISTORY OF LEFT BREAST CANCER: ICD-10-CM

## 2023-07-25 DIAGNOSIS — I36.1 NONRHEUMATIC TRICUSPID VALVE REGURGITATION: ICD-10-CM

## 2023-07-25 DIAGNOSIS — Z90.49 HX OF CHOLECYSTECTOMY: ICD-10-CM

## 2023-07-25 DIAGNOSIS — F20.9 SCHIZOPHRENIA, UNSPECIFIED TYPE (HCC): ICD-10-CM

## 2023-07-25 DIAGNOSIS — M85.80 OSTEOPENIA, UNSPECIFIED LOCATION: ICD-10-CM

## 2023-07-25 DIAGNOSIS — E78.2 MIXED HYPERLIPIDEMIA: ICD-10-CM

## 2023-07-25 DIAGNOSIS — Z86.79 H/O PERICARDITIS: ICD-10-CM

## 2023-07-25 DIAGNOSIS — M19.011 ARTHROPATHY OF RIGHT SHOULDER: ICD-10-CM

## 2023-07-25 DIAGNOSIS — I34.0 NONRHEUMATIC MITRAL VALVE REGURGITATION: ICD-10-CM

## 2023-07-25 DIAGNOSIS — G24.01 TARDIVE DYSKINESIA: ICD-10-CM

## 2023-07-25 DIAGNOSIS — K44.9 HIATAL HERNIA WITH GASTROESOPHAGEAL REFLUX: ICD-10-CM

## 2023-07-25 DIAGNOSIS — E11.8 DM (DIABETES MELLITUS), TYPE 2 WITH COMPLICATIONS (HCC): ICD-10-CM

## 2023-07-25 DIAGNOSIS — Z87.19 H/O DIVERTICULITIS OF COLON: ICD-10-CM

## 2023-07-25 DIAGNOSIS — Z86.73 HISTORY OF TIA (TRANSIENT ISCHEMIC ATTACK): ICD-10-CM

## 2023-07-25 DIAGNOSIS — K21.9 HIATAL HERNIA WITH GASTROESOPHAGEAL REFLUX: ICD-10-CM

## 2023-07-25 DIAGNOSIS — Z86.69 HISTORY OF MIGRAINE HEADACHES: ICD-10-CM

## 2023-07-25 DIAGNOSIS — M47.9 OSTEOARTHRITIS OF SPINE, UNSPECIFIED SPINAL OSTEOARTHRITIS COMPLICATION STATUS, UNSPECIFIED SPINAL REGION: ICD-10-CM

## 2023-07-25 DIAGNOSIS — M54.41 ACUTE RIGHT-SIDED LOW BACK PAIN WITH RIGHT-SIDED SCIATICA: ICD-10-CM

## 2023-07-25 DIAGNOSIS — J45.20 MILD INTERMITTENT ASTHMA WITHOUT COMPLICATION: ICD-10-CM

## 2023-07-25 DIAGNOSIS — I51.9 HEART PROBLEM: Primary | ICD-10-CM

## 2023-07-25 DIAGNOSIS — K58.9 IRRITABLE BOWEL SYNDROME, UNSPECIFIED TYPE: ICD-10-CM

## 2023-07-25 DIAGNOSIS — M79.7 FIBROMYALGIA: ICD-10-CM

## 2023-07-25 DIAGNOSIS — K76.0 FATTY LIVER: ICD-10-CM

## 2023-07-25 PROCEDURE — 93000 ELECTROCARDIOGRAM COMPLETE: CPT | Performed by: INTERNAL MEDICINE

## 2023-07-25 PROCEDURE — 99214 OFFICE O/P EST MOD 30 MIN: CPT | Performed by: INTERNAL MEDICINE

## 2023-07-25 PROCEDURE — 1123F ACP DISCUSS/DSCN MKR DOCD: CPT | Performed by: INTERNAL MEDICINE

## 2023-07-25 RX ORDER — ALBUTEROL SULFATE 90 UG/1
AEROSOL, METERED RESPIRATORY (INHALATION)
COMMUNITY
Start: 2023-06-28

## 2023-07-25 RX ORDER — BUPROPION HYDROCHLORIDE 300 MG/1
300 TABLET ORAL DAILY
COMMUNITY
Start: 2023-07-10

## 2023-07-25 RX ORDER — DICYCLOMINE HYDROCHLORIDE 10 MG/1
10 CAPSULE ORAL
COMMUNITY

## 2023-07-25 RX ORDER — OMEPRAZOLE 40 MG/1
40 CAPSULE, DELAYED RELEASE ORAL DAILY
COMMUNITY
Start: 2023-05-14

## 2023-07-25 RX ORDER — CLOPIDOGREL BISULFATE 75 MG/1
TABLET ORAL
COMMUNITY

## 2023-07-25 NOTE — PROGRESS NOTES
evidence of scars or stress-induced ischemia with no regional wall motion abnormality and with a calculated ejection fraction of 68%. 4.  Echocardiogram done on 9/8/2020 showed normal left ventricular size and wall thickness with no gross regional wall motion abnormality with an ejection fraction of 60 +/- 5% with stage 1 left ventricular diastolic dysfunction, normal right ventricular size and function. Normal size left atrium. Mild mitral regurgitation. 5.  Hyperlipidemia. 6.  Diabetes mellitus with peripheral neuropathy. 7.  Pericarditis in 1991 and 1996.   8.  Echocardiogram done on 4/8/2022 was reported as showing an ejection fraction of 60% with mild mitral regurgitation and mild tricuspid regurgitation. 9.  Pro BNP, 4/12/2022:  97.    10.  Treadmill nuclear stress test done on 5/17/2022: Braydon protocol, 5 minutes. 88% of the maximum predicted heart rate. Physiologic BP response. 6.9 METS. No chest pain. No ischemic EKG changes. Rare PVC's. Nuclear images were normal with the gated views showing normal myocardial thickening and wall motion with the calculated ejection fraction of 78%. PAST MEDICAL HISTORY:  1. As under cardiovascular history. 2.  Arthritis of the spine. 3.  Asthma, history of bronchitis and history of pneumonia. 4.  Bipolar depression and schizophrenia. 5.  History of breast lumpectomies. History of left breast cancer, status post lumpectomy and radiation therapy. 6.  History of diverticulitis. 7.  History of irritable bowel syndrome. 8.  History of hiatal hernia and GERD. Endoscopy in 2/2019 reportedly showed superficial ulcers. 9.  Fibromyalgia. 10.  History of herpes genitalia. 11.  Fatty liver. 12.  History of migraines. 13.  History of neuropathy of feet. 14.  Osteopenia. 15.  History of pancreatitis. 16.  History of abdominal surgery for removal of polyps and history of exploratory laparotomy for removal of scar tissue.   17.  History of

## 2023-10-13 ENCOUNTER — HOSPITAL ENCOUNTER (OUTPATIENT)
Dept: INFUSION THERAPY | Age: 68
Discharge: HOME | End: 2023-10-13
Payer: MEDICARE

## 2023-10-13 DIAGNOSIS — Z17.0 MALIGNANT NEOPLASM OF UPPER-OUTER QUADRANT OF RIGHT BREAST IN FEMALE, ESTROGEN RECEPTOR POSITIVE (HCC): Primary | ICD-10-CM

## 2023-10-13 DIAGNOSIS — C50.411 MALIGNANT NEOPLASM OF UPPER-OUTER QUADRANT OF RIGHT BREAST IN FEMALE, ESTROGEN RECEPTOR POSITIVE (HCC): Primary | ICD-10-CM

## 2023-10-13 LAB
ALBUMIN SERPL-MCNC: 4.2 G/DL (ref 3.5–5.2)
ALP SERPL-CCNC: 56 U/L (ref 35–104)
ALT SERPL-CCNC: 19 U/L (ref 0–32)
ANION GAP SERPL CALCULATED.3IONS-SCNC: 11 MMOL/L (ref 7–16)
AST SERPL-CCNC: 25 U/L (ref 0–31)
BASOPHILS # BLD: 0.03 K/UL (ref 0–0.2)
BASOPHILS NFR BLD: 1 % (ref 0–2)
BILIRUB SERPL-MCNC: 0.2 MG/DL (ref 0–1.2)
BUN SERPL-MCNC: 12 MG/DL (ref 6–23)
CALCIUM SERPL-MCNC: 9.1 MG/DL (ref 8.6–10.2)
CHLORIDE SERPL-SCNC: 102 MMOL/L (ref 98–107)
CO2 SERPL-SCNC: 24 MMOL/L (ref 22–29)
CREAT SERPL-MCNC: 0.7 MG/DL (ref 0.5–1)
EOSINOPHIL # BLD: 0.22 K/UL (ref 0.05–0.5)
EOSINOPHILS RELATIVE PERCENT: 4 % (ref 0–6)
ERYTHROCYTE [DISTWIDTH] IN BLOOD BY AUTOMATED COUNT: 13.2 % (ref 11.5–15)
FERRITIN SERPL-MCNC: 86 NG/ML
GFR SERPL CREATININE-BSD FRML MDRD: >60 ML/MIN/1.73M2
GLUCOSE SERPL-MCNC: 160 MG/DL (ref 74–99)
HCT VFR BLD AUTO: 38.5 % (ref 34–48)
HGB BLD-MCNC: 12.5 G/DL (ref 11.5–15.5)
IMM GRANULOCYTES # BLD AUTO: 0.03 K/UL (ref 0–0.58)
IMM GRANULOCYTES NFR BLD: 1 % (ref 0–5)
IRON SATN MFR SERPL: 18 % (ref 15–50)
IRON SERPL-MCNC: 58 UG/DL (ref 37–145)
LYMPHOCYTES NFR BLD: 2.09 K/UL (ref 1.5–4)
LYMPHOCYTES RELATIVE PERCENT: 34 % (ref 20–42)
MCH RBC QN AUTO: 32.7 PG (ref 26–35)
MCHC RBC AUTO-ENTMCNC: 32.5 G/DL (ref 32–34.5)
MCV RBC AUTO: 100.8 FL (ref 80–99.9)
MONOCYTES NFR BLD: 0.64 K/UL (ref 0.1–0.95)
MONOCYTES NFR BLD: 10 % (ref 2–12)
NEUTROPHILS NFR BLD: 51 % (ref 43–80)
NEUTS SEG NFR BLD: 3.15 K/UL (ref 1.8–7.3)
PLATELET # BLD AUTO: 235 K/UL (ref 130–450)
PMV BLD AUTO: 9.4 FL (ref 7–12)
POTASSIUM SERPL-SCNC: 5 MMOL/L (ref 3.5–5)
PROT SERPL-MCNC: 7.4 G/DL (ref 6.4–8.3)
RBC # BLD AUTO: 3.82 M/UL (ref 3.5–5.5)
SODIUM SERPL-SCNC: 137 MMOL/L (ref 132–146)
TIBC SERPL-MCNC: 317 UG/DL (ref 250–450)
WBC OTHER # BLD: 6.2 K/UL (ref 4.5–11.5)

## 2023-10-13 PROCEDURE — 80053 COMPREHEN METABOLIC PANEL: CPT

## 2023-10-13 PROCEDURE — 82728 ASSAY OF FERRITIN: CPT

## 2023-10-13 PROCEDURE — 85025 COMPLETE CBC W/AUTO DIFF WBC: CPT

## 2023-10-13 PROCEDURE — 83540 ASSAY OF IRON: CPT

## 2023-10-13 PROCEDURE — 36415 COLL VENOUS BLD VENIPUNCTURE: CPT

## 2023-10-13 PROCEDURE — 83550 IRON BINDING TEST: CPT

## 2023-10-16 ENCOUNTER — OFFICE VISIT (OUTPATIENT)
Dept: ONCOLOGY | Age: 68
End: 2023-10-16
Payer: MEDICARE

## 2023-10-16 VITALS
HEART RATE: 70 BPM | DIASTOLIC BLOOD PRESSURE: 85 MMHG | SYSTOLIC BLOOD PRESSURE: 109 MMHG | HEIGHT: 66 IN | WEIGHT: 153.9 LBS | OXYGEN SATURATION: 96 % | TEMPERATURE: 97.6 F | BODY MASS INDEX: 24.73 KG/M2

## 2023-10-16 DIAGNOSIS — Z17.0 MALIGNANT NEOPLASM OF AXILLARY TAIL OF LEFT BREAST IN FEMALE, ESTROGEN RECEPTOR POSITIVE (HCC): Primary | ICD-10-CM

## 2023-10-16 DIAGNOSIS — C50.612 MALIGNANT NEOPLASM OF AXILLARY TAIL OF LEFT BREAST IN FEMALE, ESTROGEN RECEPTOR POSITIVE (HCC): Primary | ICD-10-CM

## 2023-10-16 PROCEDURE — 99213 OFFICE O/P EST LOW 20 MIN: CPT

## 2023-10-16 NOTE — PROGRESS NOTES
[address] [place] [address] Drive  [address] [place] [address]. [facility], [address] [place] [address]        Pt Name: Stefany Smaller: 1955  Date of evaluation: 10/16/2023  Primary Care Physician: Ant Curry DO  Reason for evaluation:   No chief complaint on file. Subjective:  Here for yearly follow-up. Feels better than her hemoglobin is improved to the normal range. Was C/O dizziness and giddiness,headaches . MRI Brain was ordered by PCP. It was negative except for mild sphenoid sinusitis. C/O right shoulder pain. Received cortisone injection right shoulder by Dr. Reginald Sauer on 3/18/19. It helped her pain temporarily. S/P right shoulder arthroscopy with subacromial arch decompression,  Labral debridement, Debridement of partial thickness rotator cuff tear, and  biceps tenotomy on 8/30/2019 by Dr. Maryrose Bumpers. OBJECTIVE:  VITALS:  vitals were not taken for this visit. Physical Exam:  Performance Status: 0  Well developed, well nourished female  EYES: sclera non-icteric   ENT: oropharynx clear. NECK: No lymphadenopathy. Mild soft tissue swelling in the right neck without palpable lymphadenopathy. BREASTS :Firmness and tenderness along lump in left breast with a small 0.5-1 cm nodule felt just inferior to scar. HEART: Regular rate and rhythm. LUNGS: Clear. ABDOMEN: Soft, non-tender. No mass or organomegaly. EXTREMITIES: without edema. NEUROLOGIC: No focal deficits. Medications  Prior to Admission medications    Medication Sig Start Date End Date Taking?  Authorizing Provider   omeprazole (PRILOSEC) 40 MG delayed release capsule Take 1 capsule by mouth daily 5/14/23   Promise Martinez MD   buPROPion (WELLBUTRIN XL) 300 MG extended release tablet Take 1 tablet by mouth daily 7/10/23   Promise Martinez MD   albuterol sulfate HFA (PROVENTIL;VENTOLIN;PROAIR) 108 (90 Base) MCG/ACT inhaler INHALE TWO PUFFS BY MOUTH EVERY 4 TO 6 HOURS AS NEEDED 6/28/23   Promsie Martinez MD

## 2023-10-20 NOTE — PROGRESS NOTES
OFFICE VISIT        PRIMARY CARE PHYSICIAN:    [facility],        ALLERGIES / SENSITIVITIES:    Allergies   Allergen Reactions    Codeine Swelling     Pure codeine gets itching    Macrobid [Nitrofurantoin Monohydrate Macrocrystals] Rash    Neosporin [Neomycin-Polymyx-Gramicid] Dermatitis    Pcn [Penicillins] Hives     On stomach and thighs      Ultram [Tramadol Hcl] Itching     & hallucinations      Sulfa Antibiotics      Skin turns red          REVIEWED MEDICATIONS:      Current Outpatient Medications:     aspirin 81 MG tablet, Take 81 mg by mouth daily , Disp: , Rfl:     vitamin B-12 (CYANOCOBALAMIN) 1000 MCG tablet, Take 1,000 mcg by mouth daily, Disp: , Rfl:     buPROPion (WELLBUTRIN XL) 150 MG extended release tablet, Take 150 mg by mouth every morning, Disp: , Rfl:     Dulaglutide (TRULICITY SC), Inject 5.86 mg into the skin every 7 days , Disp: , Rfl:     loxapine (LOXITANE) 5 MG capsule, Take 10 mg by mouth nightly , Disp: , Rfl:     B Complex-Biotin-FA (B-50 COMPLEX PO), Take by mouth 2 times daily, Disp: , Rfl:     Saw [place] 450 MG CAPS, Take 450 mg by mouth daily, Disp: , Rfl:     Lactobacillus (PROBIOTIC ACIDOPHILUS PO), Take by mouth daily, Disp: , Rfl:     Turmeric 500 MG TABS, Take 500 mg by mouth daily, Disp: , Rfl:     Pseudoephedrine-Guaifenesin (MUCINEX D PO), Take by mouth as needed , Disp: , Rfl:     PSEUDOEPHEDRINE-DM PO, Take by mouth, Disp: , Rfl:     ondansetron (ZOFRAN) 4 MG tablet, Take 4 mg by mouth every 8 hours as needed for Nausea or Vomiting, Disp: , Rfl:     propranolol (INDERAL) 20 MG tablet, Take 40 mg by mouth 2 times daily 2 tablets as needed, Disp: , Rfl:     lidocaine (XYLOCAINE) 5 % ointment, Apply topically as needed. , Disp: 3 Tube, Rfl: 0    sucralfate (CARAFATE) 1 GM tablet, Take 1 g by mouth 2 times daily , Disp: , Rfl:     valACYclovir (VALTREX) 1 G tablet, Take 1,000 mg by mouth 2 times daily As needed, Disp: , Rfl:     b complex vitamins capsule, Take 1 capsule by mouth daily, Disp: , Rfl:     diphenhydrAMINE (BENADRYL) 25 MG capsule, Take 25 mg by mouth as needed 1 OR 2 TABLETS  D/T MIGRAINE, Disp: , Rfl:     montelukast (SINGULAIR) 10 MG tablet, Take 10 mg by mouth nightly., Disp: , Rfl:     simvastatin (ZOCOR) 20 MG tablet, Take 20 mg by mouth every morning (before breakfast) , Disp: , Rfl:     ranitidine (ZANTAC) 300 MG tablet, Take 300 mg by mouth nightly., Disp: , Rfl:     Acetaminophen (TYLENOL ARTHRITIS EXT RELIEF PO), Take 2 capsules by mouth 3 times daily , Disp: , Rfl:     vitamin E 400 UNIT capsule, Take 400 Units by mouth daily. , Disp: , Rfl:     ascorbic acid (VITAMIN C) 500 MG tablet, Take 500 mg by mouth 2 times daily. , Disp: , Rfl:     Lutein 40 MG CAPS, Take 1 capsule by mouth daily , Disp: , Rfl:     Lycopene 10 MG CAPS, Take 1 capsule by mouth daily. , Disp: , Rfl:     Potassium 99 MG TABS, Take 1 capsule by mouth daily. , Disp: , Rfl:     metformin (GLUMETZA) 500 MG (MOD) ER tablet, Take 1,000 mg by mouth 2 times daily (with meals). , Disp: , Rfl:     CALCIUM-VITAMIN D PO, Take 1 tablet by mouth daily 1200 mg/ 25mg, Disp: , Rfl:     losartan (COZAAR) 25 MG tablet, Take 25 mg by mouth daily. , Disp: , Rfl:     Multiple Vitamin (MULTIVITAMIN PO), Take 1 tablet by mouth once., Disp: , Rfl:     fish oil-omega-3 fatty acids 1000 MG capsule, Take 1 g by mouth 3 times daily , Disp: , Rfl:     Sertraline HCl (ZOLOFT PO), Take 200 mg by mouth nightly , Disp: , Rfl:     estradiol (ESTRACE VAGINAL) 0.1 MG/GM vaginal cream, Apply a small amount to the urethra every other day Use the tip of your finger to apply, Disp: 1 Tube, Rfl: 3    methenamine (HIPREX) 1 g tablet, Take 1 tablet by mouth every other day, Disp: 15 tablet, Rfl: 3    ibuprofen (ADVIL) 200 MG CAPS, Take 1 capsule by mouth, Disp: , Rfl:         S: REASON FOR VISIT:    Mild valvular heart disease on echocardiogram in 02/2014.   Risk factors for coronary artery weakness, numbness, tingling or tremors.                   CARDIOVASCULAR HISTORY:    1. Reported vertebrobasilar transient ischemic attack in 05/2003 with no recurrence. 2.  Cardiac catheterization 11/27/2001. Patent coronary arteries. Normal LV function. 3.  Lexiscan nuclear stress test done 02/15/2014 was within normal limits with no evidence of scars or stress-induced ischemia with no regional wall motion abnormality and with a calculated ejection fraction of 68%. 4.  Echocardiogram done 02/15/2014 was read as showing normal left ventricular size, wall thickness, wall motion and systolic function with an ejection fraction of 64% with stage I left ventricular diastolic dysfunction. Mildly sclerotic aortic valve without hemodynamically significant stenosis and without regurgitation. Mild mitral valve prolapse without mitral regurgitation. 5.  Hyperlipidemia. 6.  Diabetes mellitus. 7.  Pericarditis in 1991 and [address] HISTORY:  1. As under cardiovascular history. 2.  Arthritis of the spine. 3.  Asthma, history of bronchitis and history of pneumonia. 4.  Bipolar depression and schizophrenia. 5.  History of breast lumpectomies. History of left breast cancer, status post lumpectomy and radiation therapy. 6.  History of diverticulitis. 7.  History of irritable bowel syndrome. 8.  History of hiatal hernia and GERD. Endoscopy in 2/2019 reportedly showed superficial ulcers. 9.  Fibromyalgia. 10.  History of herpes genitalia. 11.  Fatty liver. 12.  History of migraines. 13.  History of neuropathy of feet. 14.  Osteopenia. 15.  History of pancreatitis. 16.  History of abdominal surgery for removal of polyps and history of exploratory laparotomy for removal of scar tissue. 17.  History of appendectomy. 18.  History of cholecystectomy. 19.  History of hysterectomy. 20.  History of colon surgery. 21.  History of colonoscopy. 22.  History of cystoscopy.   23.  History of bilateral cataract removal with lens implants. 24.  History of Meckel's diverticulum. 25.  History of migraine headaches. 26.  History of ovarian surgery. 27.  History of excision of birthmark from face and arm. 28.  History of nerve block to the left ilioinguinal nerve. 29.  Tardive dyskinesia. 30. Right shoulder impingement syndrome and incomplete repair of right rotator cuff.       FAMILY HISTORY:  Mother  at age 80, had bypass surgery at age 80/80. Father  of a stroke at age 80.     SOCIAL HISTORY:  Patient does not smoke. She rarely drinks alcohol. O:  COMPLETE PHYSICAL EXAM:      BP (!) 110/58 (Cuff Size: Large Adult)   Pulse 66   Ht 5' 6\" (1.676 m)   Wt 150 lb (68 kg)   BMI 24.21 kg/m²      General:          Healthy-appearing middle-aged lady in no acute distress. HEENT:           Normocephalic and atraumatic head. No JVD. No carotid bruits. Carotid upstrokes normal bilaterally. No thyromegaly. Sclerae not icteric. No xanthelasmas. Mucous membranes moist.  Chest:              Symmetrical and nontender. No deformities. Lungs:             Clear to auscultation bilaterally. Heart:              Normal S1 and S2. No S3 or S4. No murmurs or rubs. Abdomen:        Soft, nontender without organomegaly or masses. No bruits. Normal bowel sounds. Extremities:     No edema. Pulses normal.  Skin:                Normal turgor. No rashes or ulcers noted. Neurologic:      Oriented x3. No motor or sensory deficits detected.        REVIEW OF DIAGNOSTIC TESTS:    1. Blood tests from 2019 reviewed:  BUN 12, creatinine 0.7, GFR > 60, potassium 5.0, CBC unremarkable. 2.  EKG done today showed sinus rhythm and was within normal limits. ASSESSMENT / DIAGNOSIS:   1.  Mild valvular heart disease on echocardiogram in  with mild aortic sclerosis without stenosis or regurgitation and mild mitral valve prolapse without mitral regurgitation. 2.  Diabetes mellitus.   3. Hyperlipidemia. 4.  History of pericarditis. 5.  History of hiatal hernia and GERD. 6.  History of irritable bowel syndrome and history of diverticulitis. 7.  Fatty liver. 8.  Bipolar depression. 9.  Schizophrenia. 10. History of left breast cancer, S/P lumpectomy and radiation therapy. 11. Asthma with a history of bronchitis and pneumonia. 12. Arthritis of the spine. 13. Fibromyalgia. 14. Osteopenia. 15. Right shoulder impingement syndrome and partial rotator cuff tear. 16. Tardive dyskinesia. TREATMENT PLAN:  1. Follow up with Cardiology in 1 year or on a prn basis:  Consider echocardiogram for follow up valvular heart disease in 1 year. Alyssa [address] [place] [address] Cheryl.  [place] [id number] [phone] pregnant

## 2023-11-07 ENCOUNTER — APPOINTMENT (OUTPATIENT)
Dept: ULTRASOUND IMAGING | Age: 68
DRG: 069 | End: 2023-11-07
Payer: MEDICARE

## 2023-11-07 ENCOUNTER — APPOINTMENT (OUTPATIENT)
Dept: CT IMAGING | Age: 68
DRG: 069 | End: 2023-11-07
Payer: MEDICARE

## 2023-11-07 ENCOUNTER — APPOINTMENT (OUTPATIENT)
Age: 68
DRG: 069 | End: 2023-11-07
Attending: INTERNAL MEDICINE
Payer: MEDICARE

## 2023-11-07 ENCOUNTER — APPOINTMENT (OUTPATIENT)
Dept: GENERAL RADIOLOGY | Age: 68
DRG: 069 | End: 2023-11-07
Payer: MEDICARE

## 2023-11-07 ENCOUNTER — HOSPITAL ENCOUNTER (INPATIENT)
Age: 68
LOS: 1 days | Discharge: HOME | DRG: 069 | End: 2023-11-08
Attending: EMERGENCY MEDICINE | Admitting: INTERNAL MEDICINE
Payer: MEDICARE

## 2023-11-07 DIAGNOSIS — R29.90 STROKE-LIKE SYMPTOMS: ICD-10-CM

## 2023-11-07 DIAGNOSIS — G45.9 TIA (TRANSIENT ISCHEMIC ATTACK): ICD-10-CM
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
## 2023-11-07 LAB
ANION GAP SERPL CALCULATED.3IONS-SCNC: 11 MMOL/L (ref 7–16)
BASOPHILS # BLD: 0.04 K/UL (ref 0–0.2)
BASOPHILS NFR BLD: 1 % (ref 0–2)
BUN SERPL-MCNC: 14 MG/DL (ref 6–23)
CALCIUM SERPL-MCNC: 9.4 MG/DL (ref 8.6–10.2)
CHLORIDE SERPL-SCNC: 105 MMOL/L (ref 98–107)
CHOLEST SERPL-MCNC: 156 MG/DL
CO2 SERPL-SCNC: 27 MMOL/L (ref 22–29)
CREAT SERPL-MCNC: 0.8 MG/DL (ref 0.5–1)
EKG ATRIAL RATE: 56 BPM
EKG P AXIS: 47 DEGREES
EKG P-R INTERVAL: 162 MS
EKG Q-T INTERVAL: 438 MS
EKG QRS DURATION: 84 MS
EKG QTC CALCULATION (BAZETT): 422 MS
EKG R AXIS: -2 DEGREES
EKG T AXIS: 40 DEGREES
EKG VENTRICULAR RATE: 56 BPM
EOSINOPHIL # BLD: 0.29 K/UL (ref 0.05–0.5)
EOSINOPHILS RELATIVE PERCENT: 4 % (ref 0–6)
ERYTHROCYTE [DISTWIDTH] IN BLOOD BY AUTOMATED COUNT: 13.8 % (ref 11.5–15)
GFR SERPL CREATININE-BSD FRML MDRD: >60 ML/MIN/1.73M2
GLUCOSE BLD-MCNC: 104 MG/DL (ref 74–99)
GLUCOSE BLD-MCNC: 144 MG/DL (ref 74–99)
GLUCOSE BLD-MCNC: 147 MG/DL (ref 74–99)
GLUCOSE BLD-MCNC: 158 MG/DL (ref 74–99)
GLUCOSE SERPL-MCNC: 120 MG/DL (ref 74–99)
HBA1C MFR BLD: 6.9 % (ref 4–5.6)
HCT VFR BLD AUTO: 37.8 % (ref 34–48)
HDLC SERPL-MCNC: 45 MG/DL
HGB BLD-MCNC: 12.5 G/DL (ref 11.5–15.5)
IMM GRANULOCYTES # BLD AUTO: 0.03 K/UL (ref 0–0.58)
IMM GRANULOCYTES NFR BLD: 0 % (ref 0–5)
LDLC SERPL CALC-MCNC: 64 MG/DL
LYMPHOCYTES NFR BLD: 2.26 K/UL (ref 1.5–4)
LYMPHOCYTES RELATIVE PERCENT: 31 % (ref 20–42)
MCH RBC QN AUTO: 32.6 PG (ref 26–35)
MCHC RBC AUTO-ENTMCNC: 33.1 G/DL (ref 32–34.5)
MCV RBC AUTO: 98.4 FL (ref 80–99.9)
MONOCYTES NFR BLD: 0.78 K/UL (ref 0.1–0.95)
MONOCYTES NFR BLD: 11 % (ref 2–12)
NEUTROPHILS NFR BLD: 54 % (ref 43–80)
NEUTS SEG NFR BLD: 3.96 K/UL (ref 1.8–7.3)
PLATELET # BLD AUTO: 230 K/UL (ref 130–450)
PMV BLD AUTO: 9.2 FL (ref 7–12)
POTASSIUM SERPL-SCNC: 4.5 MMOL/L (ref 3.5–5)
RBC # BLD AUTO: 3.84 M/UL (ref 3.5–5.5)
SODIUM SERPL-SCNC: 143 MMOL/L (ref 132–146)
TRIGL SERPL-MCNC: 234 MG/DL
TROPONIN I SERPL HS-MCNC: 8 NG/L (ref 0–9)
VLDLC SERPL CALC-MCNC: 47 MG/DL
WBC OTHER # BLD: 7.4 K/UL (ref 4.5–11.5)

## 2023-11-07 PROCEDURE — 80061 LIPID PANEL: CPT

## 2023-11-07 PROCEDURE — 72125 CT NECK SPINE W/O DYE: CPT

## 2023-11-07 PROCEDURE — 6360000002 HC RX W HCPCS: Performed by: INTERNAL MEDICINE

## 2023-11-07 PROCEDURE — 84484 ASSAY OF TROPONIN QUANT: CPT

## 2023-11-07 PROCEDURE — 80048 BASIC METABOLIC PNL TOTAL CA: CPT

## 2023-11-07 PROCEDURE — 93306 TTE W/DOPPLER COMPLETE: CPT

## 2023-11-07 PROCEDURE — 6370000000 HC RX 637 (ALT 250 FOR IP): Performed by: INTERNAL MEDICINE

## 2023-11-07 PROCEDURE — 82962 GLUCOSE BLOOD TEST: CPT

## 2023-11-07 PROCEDURE — 83036 HEMOGLOBIN GLYCOSYLATED A1C: CPT

## 2023-11-07 PROCEDURE — 73502 X-RAY EXAM HIP UNI 2-3 VIEWS: CPT

## 2023-11-07 PROCEDURE — 93005 ELECTROCARDIOGRAM TRACING: CPT

## 2023-11-07 PROCEDURE — 99285 EMERGENCY DEPT VISIT HI MDM: CPT

## 2023-11-07 PROCEDURE — 70450 CT HEAD/BRAIN W/O DYE: CPT

## 2023-11-07 PROCEDURE — 6370000000 HC RX 637 (ALT 250 FOR IP)

## 2023-11-07 PROCEDURE — 85025 COMPLETE CBC W/AUTO DIFF WBC: CPT

## 2023-11-07 PROCEDURE — 93010 ELECTROCARDIOGRAM REPORT: CPT | Performed by: INTERNAL MEDICINE

## 2023-11-07 PROCEDURE — 1200000000 HC SEMI PRIVATE

## 2023-11-07 PROCEDURE — 93880 EXTRACRANIAL BILAT STUDY: CPT

## 2023-11-07 RX ORDER — INSULIN LISPRO 100 [IU]/ML
0-4 INJECTION, SOLUTION INTRAVENOUS; SUBCUTANEOUS NIGHTLY
Status: DISCONTINUED | OUTPATIENT
Start: 2023-11-07 | End: 2023-11-08 | Stop reason: HOSPADM

## 2023-11-07 RX ORDER — CLOPIDOGREL BISULFATE 75 MG/1
75 TABLET ORAL DAILY
Status: DISCONTINUED | OUTPATIENT
Start: 2023-11-07 | End: 2023-11-08 | Stop reason: HOSPADM

## 2023-11-07 RX ORDER — OXYCODONE HYDROCHLORIDE AND ACETAMINOPHEN 5; 325 MG/1; MG/1
1 TABLET ORAL EVERY 4 HOURS PRN
Status: DISCONTINUED | OUTPATIENT
Start: 2023-11-07 | End: 2023-11-08 | Stop reason: HOSPADM

## 2023-11-07 RX ORDER — OXYCODONE HYDROCHLORIDE 5 MG/1
2.5 TABLET ORAL EVERY 4 HOURS PRN
Status: DISCONTINUED | OUTPATIENT
Start: 2023-11-07 | End: 2023-11-08 | Stop reason: HOSPADM

## 2023-11-07 RX ORDER — OXYCODONE HYDROCHLORIDE AND ACETAMINOPHEN 5; 325 MG/1; MG/1
1 TABLET ORAL EVERY 4 HOURS PRN
Status: DISCONTINUED | OUTPATIENT
Start: 2023-11-07 | End: 2023-11-07

## 2023-11-07 RX ORDER — ACYCLOVIR 200 MG/1
400 CAPSULE ORAL 3 TIMES DAILY
Status: DISCONTINUED | OUTPATIENT
Start: 2023-11-07 | End: 2023-11-08 | Stop reason: HOSPADM

## 2023-11-07 RX ORDER — DEXTROSE MONOHYDRATE 100 MG/ML
INJECTION, SOLUTION INTRAVENOUS CONTINUOUS PRN
Status: DISCONTINUED | OUTPATIENT
Start: 2023-11-07 | End: 2023-11-08 | Stop reason: HOSPADM

## 2023-11-07 RX ORDER — ATORVASTATIN CALCIUM 20 MG/1
20 TABLET, FILM COATED ORAL NIGHTLY
Status: DISCONTINUED | OUTPATIENT
Start: 2023-11-07 | End: 2023-11-08 | Stop reason: HOSPADM

## 2023-11-07 RX ORDER — BUTALBITAL, ACETAMINOPHEN AND CAFFEINE 50; 325; 40 MG/1; MG/1; MG/1
1 TABLET ORAL ONCE
Status: COMPLETED | OUTPATIENT
Start: 2023-11-07 | End: 2023-11-07

## 2023-11-07 RX ORDER — OXYCODONE HYDROCHLORIDE 5 MG/1
2.5 TABLET ORAL EVERY 4 HOURS PRN
Status: DISCONTINUED | OUTPATIENT
Start: 2023-11-07 | End: 2023-11-07

## 2023-11-07 RX ORDER — LOXAPINE SUCCINATE 5 MG/1
10 TABLET ORAL NIGHTLY
Status: DISCONTINUED | OUTPATIENT
Start: 2023-11-07 | End: 2023-11-08 | Stop reason: HOSPADM

## 2023-11-07 RX ORDER — INSULIN LISPRO 100 [IU]/ML
0-8 INJECTION, SOLUTION INTRAVENOUS; SUBCUTANEOUS
Status: DISCONTINUED | OUTPATIENT
Start: 2023-11-07 | End: 2023-11-08 | Stop reason: HOSPADM

## 2023-11-07 RX ORDER — BUPROPION HYDROCHLORIDE 150 MG/1
300 TABLET ORAL DAILY
Status: DISCONTINUED | OUTPATIENT
Start: 2023-11-07 | End: 2023-11-08 | Stop reason: HOSPADM

## 2023-11-07 RX ORDER — OMEGA-3/DHA/EPA/FISH OIL 300-1000MG
1 CAPSULE ORAL 3 TIMES DAILY
Status: DISCONTINUED | OUTPATIENT
Start: 2023-11-07 | End: 2023-11-07 | Stop reason: RX

## 2023-11-07 RX ORDER — PANTOPRAZOLE SODIUM 40 MG/1
40 TABLET, DELAYED RELEASE ORAL
Status: DISCONTINUED | OUTPATIENT
Start: 2023-11-07 | End: 2023-11-08 | Stop reason: HOSPADM

## 2023-11-07 RX ORDER — ACETAMINOPHEN 325 MG/1
650 TABLET ORAL ONCE
Status: COMPLETED | OUTPATIENT
Start: 2023-11-07 | End: 2023-11-07

## 2023-11-07 RX ORDER — ENOXAPARIN SODIUM 100 MG/ML
40 INJECTION SUBCUTANEOUS DAILY
Status: DISCONTINUED | OUTPATIENT
Start: 2023-11-07 | End: 2023-11-08 | Stop reason: HOSPADM

## 2023-11-07 RX ORDER — LOSARTAN POTASSIUM 50 MG/1
25 TABLET ORAL DAILY
Status: DISCONTINUED | OUTPATIENT
Start: 2023-11-07 | End: 2023-11-08 | Stop reason: HOSPADM

## 2023-11-07 RX ORDER — PROCHLORPERAZINE EDISYLATE 5 MG/ML
10 INJECTION INTRAMUSCULAR; INTRAVENOUS EVERY 6 HOURS PRN
Status: DISCONTINUED | OUTPATIENT
Start: 2023-11-07 | End: 2023-11-08 | Stop reason: HOSPADM

## 2023-11-07 RX ORDER — FAMOTIDINE 20 MG/1
40 TABLET, FILM COATED ORAL DAILY
Status: DISCONTINUED | OUTPATIENT
Start: 2023-11-07 | End: 2023-11-08 | Stop reason: HOSPADM

## 2023-11-07 RX ORDER — DIPHENOXYLATE HYDROCHLORIDE AND ATROPINE SULFATE 2.5; .025 MG/1; MG/1
1 TABLET ORAL 4 TIMES DAILY PRN
Status: DISCONTINUED | OUTPATIENT
Start: 2023-11-07 | End: 2023-11-08 | Stop reason: HOSPADM

## 2023-11-07 RX ORDER — SUMATRIPTAN 50 MG/1
50 TABLET, FILM COATED ORAL ONCE
Status: DISCONTINUED | OUTPATIENT
Start: 2023-11-07 | End: 2023-11-07

## 2023-11-07 RX ORDER — MONTELUKAST SODIUM 10 MG/1
10 TABLET ORAL NIGHTLY
Status: DISCONTINUED | OUTPATIENT
Start: 2023-11-07 | End: 2023-11-08 | Stop reason: HOSPADM

## 2023-11-07 RX ORDER — INSULIN GLARGINE 100 [IU]/ML
18 INJECTION, SOLUTION SUBCUTANEOUS NIGHTLY
Status: DISCONTINUED | OUTPATIENT
Start: 2023-11-07 | End: 2023-11-08 | Stop reason: HOSPADM

## 2023-11-07 RX ORDER — ASPIRIN 81 MG/1
81 TABLET ORAL DAILY
Status: DISCONTINUED | OUTPATIENT
Start: 2023-11-07 | End: 2023-11-08 | Stop reason: HOSPADM

## 2023-11-07 RX ORDER — ONDANSETRON 4 MG/1
4 TABLET, FILM COATED ORAL EVERY 8 HOURS PRN
Status: DISCONTINUED | OUTPATIENT
Start: 2023-11-07 | End: 2023-11-08 | Stop reason: HOSPADM

## 2023-11-07 RX ORDER — OXYCODONE HYDROCHLORIDE AND ACETAMINOPHEN 5; 325 MG/1; MG/1
1 TABLET ORAL EVERY 8 HOURS PRN
Status: DISCONTINUED | OUTPATIENT
Start: 2023-11-07 | End: 2023-11-07

## 2023-11-07 RX ORDER — SUCRALFATE 1 G/1
1 TABLET ORAL DAILY
Status: DISCONTINUED | OUTPATIENT
Start: 2023-11-07 | End: 2023-11-08 | Stop reason: HOSPADM

## 2023-11-07 RX ORDER — DICYCLOMINE HYDROCHLORIDE 10 MG/1
10 CAPSULE ORAL
Status: DISCONTINUED | OUTPATIENT
Start: 2023-11-07 | End: 2023-11-08 | Stop reason: HOSPADM

## 2023-11-07 RX ORDER — PROPRANOLOL HYDROCHLORIDE 10 MG/1
20 TABLET ORAL 2 TIMES DAILY
Status: DISCONTINUED | OUTPATIENT
Start: 2023-11-07 | End: 2023-11-08 | Stop reason: HOSPADM

## 2023-11-07 RX ORDER — ACETAMINOPHEN 500 MG
500 TABLET ORAL EVERY 6 HOURS PRN
Status: DISCONTINUED | OUTPATIENT
Start: 2023-11-07 | End: 2023-11-08 | Stop reason: HOSPADM

## 2023-11-07 RX ORDER — ASCORBIC ACID 500 MG
500 TABLET ORAL 2 TIMES DAILY
Status: DISCONTINUED | OUTPATIENT
Start: 2023-11-07 | End: 2023-11-08 | Stop reason: HOSPADM

## 2023-11-07 RX ADMIN — FAMOTIDINE 40 MG: 20 TABLET, FILM COATED ORAL at 10:56

## 2023-11-07 RX ADMIN — MONTELUKAST 10 MG: 10 TABLET, FILM COATED ORAL at 20:18

## 2023-11-07 RX ADMIN — OXYCODONE HYDROCHLORIDE AND ACETAMINOPHEN 500 MG: 500 TABLET ORAL at 10:56

## 2023-11-07 RX ADMIN — ACETAMINOPHEN 650 MG: 325 TABLET ORAL at 04:32

## 2023-11-07 RX ADMIN — SUCRALFATE 1 G: 1 TABLET ORAL at 10:57

## 2023-11-07 RX ADMIN — OXYCODONE HYDROCHLORIDE AND ACETAMINOPHEN 500 MG: 500 TABLET ORAL at 20:18

## 2023-11-07 RX ADMIN — PANTOPRAZOLE SODIUM 40 MG: 40 TABLET, DELAYED RELEASE ORAL at 10:56

## 2023-11-07 RX ADMIN — CLOPIDOGREL BISULFATE 75 MG: 75 TABLET ORAL at 10:56

## 2023-11-07 RX ADMIN — ATORVASTATIN CALCIUM 20 MG: 20 TABLET, FILM COATED ORAL at 20:19

## 2023-11-07 RX ADMIN — ONDANSETRON HYDROCHLORIDE 4 MG: 4 TABLET, FILM COATED ORAL at 15:10

## 2023-11-07 RX ADMIN — LOXAPINE 10 MG: 5 CAPSULE ORAL at 20:16

## 2023-11-07 RX ADMIN — OXYCODONE AND ACETAMINOPHEN 1 TABLET: 5; 325 TABLET ORAL at 15:15

## 2023-11-07 RX ADMIN — ACYCLOVIR 400 MG: 200 CAPSULE ORAL at 14:10

## 2023-11-07 RX ADMIN — METFORMIN HYDROCHLORIDE 500 MG: 500 TABLET ORAL at 16:50

## 2023-11-07 RX ADMIN — INSULIN GLARGINE 18 UNITS: 100 INJECTION, SOLUTION SUBCUTANEOUS at 20:34

## 2023-11-07 RX ADMIN — ENOXAPARIN SODIUM 40 MG: 100 INJECTION SUBCUTANEOUS at 10:57

## 2023-11-07 RX ADMIN — LOSARTAN POTASSIUM 25 MG: 50 TABLET, FILM COATED ORAL at 10:57

## 2023-11-07 RX ADMIN — OXYCODONE AND ACETAMINOPHEN 1 TABLET: 5; 325 TABLET ORAL at 20:17

## 2023-11-07 RX ADMIN — BUTALBITAL, ACETAMINOPHEN, AND CAFFEINE 1 TABLET: 50; 325; 40 TABLET ORAL at 15:10

## 2023-11-07 RX ADMIN — BUPROPION HYDROCHLORIDE 300 MG: 150 TABLET, EXTENDED RELEASE ORAL at 10:57

## 2023-11-07 RX ADMIN — ASPIRIN 81 MG: 81 TABLET, COATED ORAL at 10:56

## 2023-11-07 RX ADMIN — METFORMIN HYDROCHLORIDE 500 MG: 500 TABLET ORAL at 10:56

## 2023-11-07 RX ADMIN — OXYCODONE AND ACETAMINOPHEN 1 TABLET: 5; 325 TABLET ORAL at 08:04

## 2023-11-07 RX ADMIN — SERTRALINE 100 MG: 50 TABLET, FILM COATED ORAL at 20:19

## 2023-11-07 RX ADMIN — ACYCLOVIR 400 MG: 200 CAPSULE ORAL at 20:18

## 2023-11-07 ASSESSMENT — PAIN DESCRIPTION - LOCATION
LOCATION: HIP
LOCATION: HEAD
LOCATION: HEAD
LOCATION: NECK;SHOULDER;HIP

## 2023-11-07 ASSESSMENT — PAIN SCALES - GENERAL
PAINLEVEL_OUTOF10: 7
PAINLEVEL_OUTOF10: 7
PAINLEVEL_OUTOF10: 10
PAINLEVEL_OUTOF10: 10
PAINLEVEL_OUTOF10: 0
PAINLEVEL_OUTOF10: 6

## 2023-11-07 ASSESSMENT — PAIN DESCRIPTION - ORIENTATION
ORIENTATION: MID
ORIENTATION: MID
ORIENTATION: POSTERIOR
ORIENTATION: RIGHT

## 2023-11-07 ASSESSMENT — PAIN - FUNCTIONAL ASSESSMENT: PAIN_FUNCTIONAL_ASSESSMENT: 0-10

## 2023-11-07 ASSESSMENT — PAIN DESCRIPTION - DESCRIPTORS
DESCRIPTORS: ACHING
DESCRIPTORS: SHARP;DISCOMFORT;SORE
DESCRIPTORS: ACHING
DESCRIPTORS: ACHING

## 2023-11-07 NOTE — ED NOTES
Pt report to RN for admission. Pt unable to proceed to admission bed until after 0700.       Doris Kaplan RN  11/07/23 0601

## 2023-11-07 NOTE — ED PROVIDER NOTES
[address]        Pt Name: Velasquez Barnett  MRN: [id number]  [facility] 1955  Date of evaluation: 11/7/2023  Provider: Maverick Ballesteros MD  PCP: Hedy Carreon DO  Note Started: 4:21 AM EST 11/7/23    CHIEF COMPLAINT       Chief Complaint   Patient presents with    Fall     Hit head and right hip. On Plavix, no LOC. HISTORY OF PRESENT ILLNESS: 1 or more Elements   History From: Patient    Limitations to history : None    Velasquez Barnett is a 79 y.o. female with a history of TIA on Plavix, HTN, HLD, DM, asthma, diabetic peripheral neuropathy of feet who presents for fall at home just prior to arrival.  Patient states she woke up around 3:30 AM and felt weakness of her right upper and lower extremities. She tried to get out of bed and fell due to this weakness. She states she hit her right hip as well as her head. Denies loss of consciousness. She is on Plavix. She states her right-sided weakness has resolved at this time and was not present when she went to bed around 11 PM.  The patient does have a history of TIAs. At this time she endorses right hip pain as well as mild right head pain and mild lightheadedness. Denies neck pain, chest pain, shortness of breath, abdominal pain, nausea, vomiting. Nursing Notes were all reviewed and agreed with or any disagreements were addressed in the HPI. REVIEW OF EXTERNAL NOTE :       Patient was last seen in the ED on 5/15/2023 for right foot pain and abnormal bone x-ray    She was last admitted to the hospital on 11/2/2022 for strokelike symptoms    REVIEW OF SYSTEMS :           Positives and Pertinent negatives as per HPI.      SURGICAL HISTORY     Past Surgical History:   Procedure Laterality Date    ABDOMINAL EXPLORATION SURGERY  2010    exploratory for scar tissue    APPENDECTOMY      BREAST LUMPECTOMY Left 10/14/2015    excision palpable left breast lesion

## 2023-11-07 NOTE — H&P
Department of Internal Medicine        CHIEF COMPLAINT: Right upper and lower extremity weakness with fall    Reason for Admission: TIA    HISTORY OF PRESENT ILLNESS:      The patient is a 79 y.o. female who presents with waking up about 3:30 AM and felt some weakness in her right upper extremity and lower extremity when she tried to get out of bed she fell. She hit her right hip as well as her head. There was no loss of consciousness. Patient states that by the time she got to the emergency room her right-sided weakness had resolved. Patient states she does have a history of TIAs. Patient currently has some mild lightheadedness. She also has a history of hypertension, diabetes, hyperlipidemia and diabetic peripheral neuropathy. Patient's  is at the bedside and case discussed in detail. Patient states her last episode of TIA was about 1 year ago when she is at [facility].  Patient states he did not find the etiology at that time either.     Past Medical History:    Past Medical History:   Diagnosis Date    Arthritis     spine    Asthma     Bipolar depression ([address])     Breast cancer ([address]) 2010    breast left  ( lumpectomy and radiation)    Colitis     Diabetes mellitus ([address])     Diabetic peripheral neuropathy (HCC)     of feet    Diverticulitis     Fatty liver     Fibromyalgia     GERD (gastroesophageal reflux disease)     Hiatal hernia     HSV-2 infection     herpes 2 genitalia    Hyperlipidemia     Hypertension     Migraines     Osteopenia     Pancreatitis     Pericarditis [id number]    had several episodes during this time span    Pneumonia 10/1991    TIA (transient ischemic attack) 2008    no deficits     Past Surgical History:    Past Surgical History:   Procedure Laterality Date    ABDOMINAL EXPLORATION SURGERY  2010    exploratory for scar tissue    APPENDECTOMY      BREAST LUMPECTOMY Left 10/14/2015    excision palpable left breast lesion     CARDIAC CATHETERIZATION      heart cath

## 2023-11-07 NOTE — PLAN OF CARE
Problem: Discharge Planning  Goal: Discharge to home or other facility with appropriate resources  Outcome: Progressing  Flowsheets (Taken 11/7/2023 3863)  Discharge to home or other facility with appropriate resources: Identify barriers to discharge with patient and caregiver     Problem: Pain  Goal: Verbalizes/displays adequate comfort level or baseline comfort level  Outcome: Progressing

## 2023-11-08 VITALS
RESPIRATION RATE: 16 BRPM | SYSTOLIC BLOOD PRESSURE: 117 MMHG | DIASTOLIC BLOOD PRESSURE: 72 MMHG | HEIGHT: 66 IN | OXYGEN SATURATION: 93 % | WEIGHT: 149.91 LBS | TEMPERATURE: 98.2 F | HEART RATE: 65 BPM | BODY MASS INDEX: 24.09 KG/M2

## 2023-11-08 LAB
ALBUMIN SERPL-MCNC: 4 G/DL (ref 3.5–5.2)
ALP SERPL-CCNC: 55 U/L (ref 35–104)
ALT SERPL-CCNC: 18 U/L (ref 0–32)
ANION GAP SERPL CALCULATED.3IONS-SCNC: 11 MMOL/L (ref 7–16)
AST SERPL-CCNC: 21 U/L (ref 0–31)
BASOPHILS # BLD: 0.03 K/UL (ref 0–0.2)
BASOPHILS NFR BLD: 1 % (ref 0–2)
BILIRUB SERPL-MCNC: 0.2 MG/DL (ref 0–1.2)
BUN SERPL-MCNC: 14 MG/DL (ref 6–23)
CALCIUM SERPL-MCNC: 9.1 MG/DL (ref 8.6–10.2)
CHLORIDE SERPL-SCNC: 104 MMOL/L (ref 98–107)
CO2 SERPL-SCNC: 25 MMOL/L (ref 22–29)
CREAT SERPL-MCNC: 0.7 MG/DL (ref 0.5–1)
ECHO AV AREA PEAK VELOCITY: 1.7 CM2
ECHO AV AREA PEAK VELOCITY: 1.8 CM2
ECHO AV AREA PEAK VELOCITY: 2 CM2
ECHO AV AREA PEAK VELOCITY: 2.1 CM2
ECHO AV AREA VTI: 1.9 CM2
ECHO AV AREA/BSA VTI: 1.1 CM2/M2
ECHO AV CUSP MM: 2.1 CM
ECHO AV MEAN GRADIENT: 6 MMHG
ECHO AV MEAN VELOCITY: 1.2 M/S
ECHO AV PEAK GRADIENT: 10 MMHG
ECHO AV PEAK GRADIENT: 13 MMHG
ECHO AV PEAK VELOCITY: 1.6 M/S
ECHO AV PEAK VELOCITY: 1.8 M/S
ECHO AV VTI: 41.1 CM
ECHO BSA: 1.78 M2
ECHO EST RA PRESSURE: 3 MMHG
ECHO LA DIAMETER INDEX: 1.92 CM/M2
ECHO LA DIAMETER: 3.4 CM
ECHO LA VOL A-L A2C: 57 ML (ref 22–52)
ECHO LA VOL A-L A2C: 59 ML (ref 22–52)
ECHO LA VOL A-L A4C: 59 ML (ref 22–52)
ECHO LA VOL A-L A4C: 67 ML (ref 22–52)
ECHO LA VOLUME AREA LENGTH: 64 ML
ECHO LA VOLUME INDEX AREA LENGTH: 36 ML/M2 (ref 16–34)
ECHO LV FRACTIONAL SHORTENING: 37 % (ref 28–44)
ECHO LV INTERNAL DIMENSION DIASTOLE INDEX: 2.43 CM/M2
ECHO LV INTERNAL DIMENSION DIASTOLIC: 4.3 CM (ref 3.9–5.3)
ECHO LV INTERNAL DIMENSION SYSTOLIC INDEX: 1.53 CM/M2
ECHO LV INTERNAL DIMENSION SYSTOLIC: 2.7 CM
ECHO LV IVSD: 1.3 CM (ref 0.6–0.9)
ECHO LV IVSS: 1.5 CM
ECHO LV MASS 2D: 207.8 G (ref 67–162)
ECHO LV MASS INDEX 2D: 117.4 G/M2 (ref 43–95)
ECHO LV POSTERIOR WALL DIASTOLIC: 1.3 CM (ref 0.6–0.9)
ECHO LV POSTERIOR WALL SYSTOLIC: 1.4 CM
ECHO LV RELATIVE WALL THICKNESS RATIO: 0.6
ECHO LVOT AREA: 3.1 CM2
ECHO LVOT AV VTI INDEX: 0.61
ECHO LVOT DIAM: 2 CM
ECHO LVOT MEAN GRADIENT: 2 MMHG
ECHO LVOT PEAK GRADIENT: 4 MMHG
ECHO LVOT PEAK GRADIENT: 4 MMHG
ECHO LVOT PEAK VELOCITY: 1 M/S
ECHO LVOT PEAK VELOCITY: 1 M/S
ECHO LVOT STROKE VOLUME INDEX: 44.4 ML/M2
ECHO LVOT SV: 78.5 ML
ECHO LVOT VTI: 25 CM
ECHO MV "A" WAVE DURATION: 179.9 MSEC
ECHO MV A VELOCITY: 0.81 M/S
ECHO MV AREA PHT: 4.4 CM2
ECHO MV AREA VTI: 2.4 CM2
ECHO MV E DECELERATION TIME (DT): 228.3 MS
ECHO MV E VELOCITY: 0.67 M/S
ECHO MV E/A RATIO: 0.83
ECHO MV LVOT VTI INDEX: 1.28
ECHO MV MAX VELOCITY: 1 M/S
ECHO MV MEAN GRADIENT: 1 MMHG
ECHO MV MEAN VELOCITY: 0.5 M/S
ECHO MV PEAK GRADIENT: 4 MMHG
ECHO MV PRESSURE HALF TIME (PHT): 49.7 MS
ECHO MV VTI: 32.1 CM
ECHO PV MAX VELOCITY: 1 M/S
ECHO PV MEAN GRADIENT: 2 MMHG
ECHO PV MEAN VELOCITY: 0.7 M/S
ECHO PV PEAK GRADIENT: 4 MMHG
ECHO PV VTI: 23.4 CM
ECHO PVEIN A DURATION: 179.9 MS
ECHO PVEIN A VELOCITY: 0.3 M/S
ECHO PVEIN PEAK D VELOCITY: 0.3 M/S
ECHO PVEIN PEAK S VELOCITY: 0.5 M/S
ECHO PVEIN S/D RATIO: 1.7
ECHO RIGHT VENTRICULAR SYSTOLIC PRESSURE (RVSP): 27 MMHG
ECHO RV INTERNAL DIMENSION: 3.1 CM
ECHO RVOT PEAK GRADIENT: 1 MMHG
ECHO RVOT PEAK VELOCITY: 0.6 M/S
ECHO TV REGURGITANT MAX VELOCITY: 2.47 M/S
ECHO TV REGURGITANT PEAK GRADIENT: 24 MMHG
EOSINOPHIL # BLD: 0.22 K/UL (ref 0.05–0.5)
EOSINOPHILS RELATIVE PERCENT: 4 % (ref 0–6)
ERYTHROCYTE [DISTWIDTH] IN BLOOD BY AUTOMATED COUNT: 13.8 % (ref 11.5–15)
GFR SERPL CREATININE-BSD FRML MDRD: >60 ML/MIN/1.73M2
GLUCOSE BLD-MCNC: 125 MG/DL (ref 74–99)
GLUCOSE BLD-MCNC: 66 MG/DL (ref 74–99)
GLUCOSE BLD-MCNC: 89 MG/DL (ref 74–99)
GLUCOSE SERPL-MCNC: 131 MG/DL (ref 74–99)
HCT VFR BLD AUTO: 36.8 % (ref 34–48)
HGB BLD-MCNC: 12.1 G/DL (ref 11.5–15.5)
IMM GRANULOCYTES # BLD AUTO: <0.03 K/UL (ref 0–0.58)
IMM GRANULOCYTES NFR BLD: 0 % (ref 0–5)
LYMPHOCYTES NFR BLD: 1.85 K/UL (ref 1.5–4)
LYMPHOCYTES RELATIVE PERCENT: 31 % (ref 20–42)
MCH RBC QN AUTO: 32.2 PG (ref 26–35)
MCHC RBC AUTO-ENTMCNC: 32.9 G/DL (ref 32–34.5)
MCV RBC AUTO: 97.9 FL (ref 80–99.9)
MONOCYTES NFR BLD: 0.7 K/UL (ref 0.1–0.95)
MONOCYTES NFR BLD: 12 % (ref 2–12)
NEUTROPHILS NFR BLD: 53 % (ref 43–80)
NEUTS SEG NFR BLD: 3.17 K/UL (ref 1.8–7.3)
PLATELET # BLD AUTO: 197 K/UL (ref 130–450)
PMV BLD AUTO: 9.2 FL (ref 7–12)
POTASSIUM SERPL-SCNC: 4.2 MMOL/L (ref 3.5–5)
PROT SERPL-MCNC: 7.2 G/DL (ref 6.4–8.3)
RBC # BLD AUTO: 3.76 M/UL (ref 3.5–5.5)
SODIUM SERPL-SCNC: 140 MMOL/L (ref 132–146)
TSH SERPL DL<=0.05 MIU/L-ACNC: 2.11 UIU/ML (ref 0.27–4.2)
WBC OTHER # BLD: 6 K/UL (ref 4.5–11.5)

## 2023-11-08 PROCEDURE — 97165 OT EVAL LOW COMPLEX 30 MIN: CPT

## 2023-11-08 PROCEDURE — 97161 PT EVAL LOW COMPLEX 20 MIN: CPT | Performed by: PHYSICAL THERAPIST

## 2023-11-08 PROCEDURE — 84443 ASSAY THYROID STIM HORMONE: CPT

## 2023-11-08 PROCEDURE — 6360000002 HC RX W HCPCS: Performed by: INTERNAL MEDICINE

## 2023-11-08 PROCEDURE — 82962 GLUCOSE BLOOD TEST: CPT

## 2023-11-08 PROCEDURE — 97530 THERAPEUTIC ACTIVITIES: CPT

## 2023-11-08 PROCEDURE — 85025 COMPLETE CBC W/AUTO DIFF WBC: CPT

## 2023-11-08 PROCEDURE — 97530 THERAPEUTIC ACTIVITIES: CPT | Performed by: PHYSICAL THERAPIST

## 2023-11-08 PROCEDURE — 36415 COLL VENOUS BLD VENIPUNCTURE: CPT

## 2023-11-08 PROCEDURE — 93306 TTE W/DOPPLER COMPLETE: CPT | Performed by: INTERNAL MEDICINE

## 2023-11-08 PROCEDURE — 6370000000 HC RX 637 (ALT 250 FOR IP): Performed by: INTERNAL MEDICINE

## 2023-11-08 PROCEDURE — 80053 COMPREHEN METABOLIC PANEL: CPT

## 2023-11-08 RX ORDER — ASPIRIN 81 MG/1
81 TABLET ORAL DAILY
Qty: 30 TABLET | Refills: 0 | COMMUNITY
Start: 2023-11-09

## 2023-11-08 RX ADMIN — SUCRALFATE 1 G: 1 TABLET ORAL at 08:39

## 2023-11-08 RX ADMIN — ASPIRIN 81 MG: 81 TABLET, COATED ORAL at 08:39

## 2023-11-08 RX ADMIN — ENOXAPARIN SODIUM 40 MG: 100 INJECTION SUBCUTANEOUS at 08:49

## 2023-11-08 RX ADMIN — LOSARTAN POTASSIUM 25 MG: 50 TABLET, FILM COATED ORAL at 08:38

## 2023-11-08 RX ADMIN — PANTOPRAZOLE SODIUM 40 MG: 40 TABLET, DELAYED RELEASE ORAL at 05:51

## 2023-11-08 RX ADMIN — OXYCODONE AND ACETAMINOPHEN 1 TABLET: 5; 325 TABLET ORAL at 03:29

## 2023-11-08 RX ADMIN — FAMOTIDINE 40 MG: 20 TABLET, FILM COATED ORAL at 08:37

## 2023-11-08 RX ADMIN — METFORMIN HYDROCHLORIDE 500 MG: 500 TABLET ORAL at 08:39

## 2023-11-08 RX ADMIN — CLOPIDOGREL BISULFATE 75 MG: 75 TABLET ORAL at 08:39

## 2023-11-08 RX ADMIN — OXYCODONE AND ACETAMINOPHEN 1 TABLET: 5; 325 TABLET ORAL at 08:37

## 2023-11-08 RX ADMIN — ACYCLOVIR 400 MG: 200 CAPSULE ORAL at 08:39

## 2023-11-08 RX ADMIN — OXYCODONE HYDROCHLORIDE AND ACETAMINOPHEN 500 MG: 500 TABLET ORAL at 08:37

## 2023-11-08 RX ADMIN — BUPROPION HYDROCHLORIDE 300 MG: 150 TABLET, EXTENDED RELEASE ORAL at 08:39

## 2023-11-08 ASSESSMENT — PAIN DESCRIPTION - ORIENTATION
ORIENTATION: RIGHT
ORIENTATION: RIGHT

## 2023-11-08 ASSESSMENT — PAIN DESCRIPTION - DESCRIPTORS
DESCRIPTORS: ACHING
DESCRIPTORS: DISCOMFORT;SORE;ACHING

## 2023-11-08 ASSESSMENT — PAIN SCALES - GENERAL
PAINLEVEL_OUTOF10: 7
PAINLEVEL_OUTOF10: 0
PAINLEVEL_OUTOF10: 6

## 2023-11-08 ASSESSMENT — PAIN DESCRIPTION - LOCATION
LOCATION: SHOULDER
LOCATION: SHOULDER;NECK

## 2023-11-08 NOTE — ACP (ADVANCE CARE PLANNING)
Advance Care Planning   Healthcare Decision Maker:    Primary Decision Maker: Boni Vogt - [phone]    Secondary Decision Maker: Yudith Huang - [phone]    Click here to complete Healthcare Decision Makers including selection of the Healthcare Decision Maker Relationship (ie \"Primary\"). Today we documented Decision Maker(s) consistent with Legal Next of Kin hierarchy.

## 2023-11-08 NOTE — DISCHARGE INSTRUCTIONS
Your information:  Name: Princess Arnold  : 1955    Your instructions:    Discharge home. Follow up with primary care provider as directed. Signs and symptoms to look out for:      What to do after you leave the hospital:    Recommended diet: {diet:08567}    Recommended activity: {discharge activity:49583}        The following personal items were collected during your admission and were returned to you:    Belongings  Dental Appliances: Uppers, Lowers, Partials  Vision - Corrective Lenses: Eyeglasses  Hearing Aid: None  Clothing: Footwear, Pants, Shirt, Socks, Undergarments  Jewelry: None  Body Piercings Removed: No  Electronic Devices: None  Weapons (Notify Protective Services/Security): None  Other Valuables: At home  Home Medications: None  Valuables Given To: Patient  Provide Name(s) of Who Valuable(s) Were Given To: josias  Responsible person(s) in the waiting room: n/a  Patient approves for provider to speak to responsible person post operatively: Yes    Information obtained by:  By signing below, I understand that if any problems occur once I leave the hospital I am to contact ***. I understand and acknowledge receipt of the instructions indicated above.

## 2023-11-08 NOTE — PROGRESS NOTES
4 Eyes Skin Assessment     NAME:  Kathy Castro  YOB: 1955  MEDICAL RECORD NUMBER:  [id number]    The patient is being assessed for  Admission    I agree that at least one RN has performed a thorough Head to Toe Skin Assessment on the patient. ALL assessment sites listed below have been assessed. Areas assessed by both nurses:    Head, Face, Ears, Shoulders, Back, Chest, Arms, Elbows, Hands, Sacrum. Buttock, Coccyx, Ischium, Legs. Feet and Heels, and Under Medical Devices         Does the Patient have a Wound?  No noted wound(s)       Felipe Prevention initiated by RN: Yes  Wound Care Orders initiated by RN: No    Pressure Injury (Stage 3,4, Unstageable, DTI, NWPT, and Complex wounds) if present, place Wound referral order by RN under : No    New Ostomies, if present place, Ostomy referral order under : No     Nurse 1 eSignature: Electronically signed by Michaela Essex, RN on 11/7/23 at 5:10 PM EST    **SHARE this note so that the co-signing nurse can place an eSignature**    Nurse 2 eSignature: Electronically signed by Titus Santana RN on 11/7/23 at 5:11 PM EST
[facility]  [address] [place]. [place]        Date:2023                                                  Patient Name: Sallie Christianson    MRN: [id number]    : 1955    Room: [address] [place], [place] [id number]      Evaluating OT: James Oliveros OTR/L; [id number]     Referring Provider and Specific Provider Orders/Date:      23  OT eval and treat  Start:  23,   End:  23,   ONE TIME,   Standing Count:  1 Occurrences,   R         Ene Romero DO      Placement Recommendation: Home with no skilled occupational therapy needed after discharge from inpatient. Diagnosis:   1. Fall, initial encounter    2. Stroke-like symptoms    3.  TIA (transient ischemic attack)         Surgery: none       Pertinent Medical History:       Past Medical History:   Diagnosis Date    Arthritis     spine    Asthma     Bipolar depression ([address])     Breast cancer ([address])     breast left  ( lumpectomy and radiation)    Colitis     Diabetes mellitus ([address])     Diabetic peripheral neuropathy (HCC)     of feet    Diverticulitis     Fatty liver     Fibromyalgia     GERD (gastroesophageal reflux disease)     Hiatal hernia     HSV-2 infection     herpes 2 genitalia    Hyperlipidemia     Hypertension     Migraines     Osteopenia     Pancreatitis     Pericarditis 5002-6096    had several episodes during this time span    Pneumonia 10/1991    TIA (transient ischemic attack)     no deficits         Past Surgical History:   Procedure Laterality Date    ABDOMINAL EXPLORATION SURGERY  2010    exploratory for scar tissue    APPENDECTOMY      BREAST LUMPECTOMY Left 10/14/2015    excision palpable left breast lesion     CARDIAC CATHETERIZATION      heart cath many yrs ago states was normal    CATARACT REMOVAL WITH IMPLANT Left 13    /with PC IOL    CATARACT REMOVAL WITH IMPLANT Right 3 5 13    CHOLECYSTECTOMY      CYSTOSCOPY
Pharmacy Note    This patient was ordered Fish Oil Caps. Per the [facility], this medication is non-formulary and not stocked by pharmacy for the reason indicated below. The medication can be reordered at discharge. FAN More Ph. 11/7/202310:25 AM
Score: 11.2  Mobility Inpatient CMS G-Code Modifier : CI    Nursing cleared patient for PT evaluation. The admitting diagnosis and active problem list as listed above have been reviewed prior to the initiation of this evaluation. OBJECTIVE:   Initial Evaluation  Date: 11/8/2023   Was pt agreeable to Eval/treatment? Yes   Pain level   0-5-6/10  Right hip and migraine 5-6/10   Bed Mobility  Using rails and head of bed elevated:       Rolling: Independent    Supine to sit: Independent    Sit to supine: Independent    Scooting: Independent     Transfers Sit to stand: Supervision      Ambulation     1 x 100 feet 1 x 150 feet using  no device with Supervision    cues for safety   Stair negotiation: ascended and descended   10 steps, 1 rail, Supervision    slow reciprocal pattern   ROM Within functional limits     Strength BUE:  refer to OT eval  RLE:  4/5  LLE:  4/5   Balance Sitting EOB:  good    Dynamic Standing:  good -     Patient is Alert & Oriented x person, place, time, and situation and follows directions    Sensation:  Patient  denies numbness/tingling   Edema:  no   Endurance: fair  +    Vitals: room air   Blood Pressure at rest  Blood Pressure during session    Heart Rate at rest 65 Heart Rate during session 78   SPO2 at rest 93%  SPO2 during session 94%     Patient education  Patient educated on role of Physical Therapy, risks of immobility, safety and plan of care,  importance of mobility while in hospital , and stair training      Patient response to education:   Pt verbalized understanding Pt demonstrated skill Pt requires further education in this area   Yes Yes No      Treatment:  Patient practiced and was instructed/facilitated in the following treatment: Patient assisted to edge of bed,   Sat edge of bed 10 minutes with No assist to increase dynamic sitting balance and activity tolerance.  Donned shoes, ambulated in hallway, seated rest, performed stairs, ambulated back to room, returned to bed

## 2023-11-08 NOTE — CARE COORDINATION
Case Management Assessment  Initial Evaluation    Date/Time of Evaluation: 2023 12:35 PM  Assessment Completed by: Iva Apgar, LSW    If patient is discharged prior to next notation, then this note serves as note for discharge by case management. Patient Name: Awilda Bernheim                   YOB: 1955  Diagnosis: TIA (transient ischemic attack) [G45.9]  Stroke-like symptoms [R29.90]  Fall, initial encounter [W19. XXXA]                   Date / Time: 2023  4:11 AM    Patient Admission Status: Inpatient   Readmission Risk (Low < 19, Mod (19-27), High > 27): Readmission Risk Score: 7    Current PCP: Miguel Ángel Stout, DO  PCP verified by CM? Yes    Chart Reviewed: Yes      History Provided by: Patient  Patient Orientation: Alert and Oriented    Patient Cognition: Alert    Hospitalization in the last 30 days (Readmission):  No    If yes, Readmission Assessment in CM Navigator will be completed. Advance Directives:      Code Status: Full Code   Patient's Primary Decision Maker is: Named in Scanned ACP Document    Primary Decision MakerNelle Kristen Spouse - [phone]    Secondary Decision Maker: Kevin Marvin - Child - [phone]    Discharge Planning:    Patient lives with: Spouse/Significant Other Type of Home: House  Primary Care Giver: Self  Patient Support Systems include: Spouse/Significant Other   Current Financial resources:    Current community resources:    Current services prior to admission: None            Current DME:              Type of Home Care services:  None    ADLS  Prior functional level: Independent in ADLs/IADLs  Current functional level: Independent in ADLs/IADLs    PT AM-PAC:   OT AM-PAC:     Family can provide assistance at DC: Yes  Would you like Case Management to discuss the discharge plan with any other family members/significant others, and if so, who?  No  Plans to Return to Present Housing: Yes  Other Identified Issues/Barriers to

## 2023-11-08 NOTE — DISCHARGE SUMMARY
Department of Internal Medicine        CHIEF COMPLAINT: Right upper and lower extremity weakness with fall    Reason for Admission: TIA    HISTORY OF PRESENT ILLNESS:      The patient is a 79 y.o. female who presents with waking up about 3:30 AM and felt some weakness in her right upper extremity and lower extremity when she tried to get out of bed she fell. She hit her right hip as well as her head. There was no loss of consciousness. Patient states that by the time she got to the emergency room her right-sided weakness had resolved. Patient states she does have a history of TIAs. Patient currently has some mild lightheadedness. She also has a history of hypertension, diabetes, hyperlipidemia and diabetic peripheral neuropathy. Patient's  is at the bedside and case discussed in detail. Patient states her last episode of TIA was about 1 year ago when she is at [facility].  Patient states he did not find the etiology at that time either. 11/8/2023  Patient seen and examined on medical floor with telemetry. Patient denied problem of dizziness, palpitation, paresthesias. There is no unusual shortness of breath. Patient is ambulating in the hallway without problems. BUN/creatinine 14/0.7 normal electrolytes. Blood sugars range 125-108. Hemoglobin A1c 6.9. WBC 6.0 with hemoglobin 12.1. Surgical floor. Temperatures 98.2 with heart rate of 65 and blood pressure 117/72. O2 sat 93% on room air at rest.  Echocardiogram performed essentially normal with the patient having a stage I diastolic dysfunction. No evidence of thrombus or shunt. X-ray of the hip were unremarkable on admission. Patient is stable for discharge.     Past Medical History:    Past Medical History:   Diagnosis Date    Arthritis     spine    Asthma     Bipolar depression ([address])     Breast cancer ([address]) 2010    breast left  ( lumpectomy and radiation)    Colitis     Diabetes mellitus ([address])     Diabetic peripheral

## 2023-11-20 ENCOUNTER — HOSPITAL ENCOUNTER (OUTPATIENT)
Dept: MAMMOGRAPHY | Age: 68
Discharge: HOME | End: 2023-11-22
Payer: MEDICARE

## 2023-11-20 ENCOUNTER — HOSPITAL ENCOUNTER (OUTPATIENT)
Dept: ULTRASOUND IMAGING | Age: 68
Discharge: HOME | End: 2023-11-20
Payer: MEDICARE

## 2023-11-20 VITALS — HEIGHT: 66 IN | BODY MASS INDEX: 23.78 KG/M2 | WEIGHT: 148 LBS

## 2023-11-20 DIAGNOSIS — Z12.31 SCREENING MAMMOGRAM FOR HIGH-RISK PATIENT: ICD-10-CM

## 2023-11-20 DIAGNOSIS — R92.8 ABNORMAL MAMMOGRAM: ICD-10-CM

## 2023-11-20 PROCEDURE — 77067 SCR MAMMO BI INCL CAD: CPT

## 2023-11-20 PROCEDURE — 76642 ULTRASOUND BREAST LIMITED: CPT

## 2023-11-20 PROCEDURE — G0279 TOMOSYNTHESIS, MAMMO: HCPCS

## 2023-11-22 ENCOUNTER — TELEPHONE (OUTPATIENT)
Dept: MAMMOGRAPHY | Age: 68
End: 2023-11-22

## 2023-11-22 DIAGNOSIS — C50.411 MALIGNANT NEOPLASM OF UPPER-OUTER QUADRANT OF RIGHT BREAST IN FEMALE, ESTROGEN RECEPTOR POSITIVE (HCC): ICD-10-CM

## 2023-11-22 DIAGNOSIS — N63.0 BREAST NODULE: Primary | ICD-10-CM

## 2023-11-22 DIAGNOSIS — Z17.0 MALIGNANT NEOPLASM OF UPPER-OUTER QUADRANT OF RIGHT BREAST IN FEMALE, ESTROGEN RECEPTOR POSITIVE (HCC): ICD-10-CM

## 2023-11-22 PROCEDURE — 76942 ECHO GUIDE FOR BIOPSY: CPT | Performed by: NURSE PRACTITIONER

## 2023-11-22 NOTE — TELEPHONE ENCOUNTER
Call to patient in reference to her mammogram performed at [facility] on 11/20/23. Instructed patient that the radiologist has recommended a left breast biopsy. Patient verbalized understanding and is agreeable to proceed at [facility] Orders received in Epic per Harry Zapata NP.  Patient forwarded to IR scheduling to arrange appointment, scheduled for 12/7/23 at 11 am. RAVI AllenN, RN -Breast Navigator

## 2023-12-07 ENCOUNTER — HOSPITAL ENCOUNTER (OUTPATIENT)
Dept: MAMMOGRAPHY | Age: 68
Discharge: HOME | End: 2023-12-09
Payer: MEDICARE

## 2023-12-07 ENCOUNTER — HOSPITAL ENCOUNTER (OUTPATIENT)
Dept: ULTRASOUND IMAGING | Age: 68
Discharge: HOME | End: 2023-12-07
Payer: MEDICARE

## 2023-12-07 DIAGNOSIS — Z17.0 MALIGNANT NEOPLASM OF UPPER-OUTER QUADRANT OF RIGHT BREAST IN FEMALE, ESTROGEN RECEPTOR POSITIVE (HCC): ICD-10-CM

## 2023-12-07 DIAGNOSIS — N63.0 BREAST NODULE: ICD-10-CM

## 2023-12-07 DIAGNOSIS — C50.411 MALIGNANT NEOPLASM OF UPPER-OUTER QUADRANT OF RIGHT BREAST IN FEMALE, ESTROGEN RECEPTOR POSITIVE (HCC): ICD-10-CM

## 2023-12-07 DIAGNOSIS — R92.8 ABNORMAL MAMMOGRAM: ICD-10-CM

## 2023-12-07 PROCEDURE — 77065 DX MAMMO INCL CAD UNI: CPT

## 2023-12-07 PROCEDURE — 2709999900 US BREAST BIOPSY W LOC DEVICE 1ST LESION LEFT

## 2023-12-07 NOTE — PROGRESS NOTES
Met with patient prior to her breast biopsy. Instructed on left  breast biopsy procedure. Denies use of blood thinners or aspirin products within the past 5 days. I remained with her during the biopsy. She tolerated biopsy well. Instructed that results will be available in approximately 3-5 business days. She confirms that she has an active [facility] account and is agreeable to discussion of breast biopsy results by phone. Instructed that her physician will also be notified of results. Provided with folder containing my contact information and post biopsy discharge instructions. Instructed to call me if she has any questions or concerns about her biopsy. Verbalizes understanding.  RAVI GrimaldoN, RN - Breast Navigator

## 2023-12-13 LAB — SURGICAL PATHOLOGY REPORT: NORMAL

## 2023-12-14 ENCOUNTER — TELEPHONE (OUTPATIENT)
Dept: MAMMOGRAPHY | Age: 68
End: 2023-12-14

## 2023-12-14 NOTE — TELEPHONE ENCOUNTER
Per patient request at time of breast biopsy, I called with her beast biopsy results. Instructed that the results of her recent left breast biopsy indicates invasive ductal carcinoma. Instructed on cancer type and hormone receptor status. Instructed on next steps including following up with her healthcare professional regarding a referral to a surgeon for surgical consultation and metastatic workup. Patient states she wants to proceed with Dr. Savana Koo for surgical consultation. Patient is established with Dr. Heidy Mota due to previous DCIS diagnosis in 2010. Notified patient I will contact Dr. Katina Dance office to notify of results and for referral to Dr. Savana Koo at [facility]. Questions answered to her apparent satisfaction. Notified Karma Hicks, Lead RN at [facility],  she will notify Dr. Heidy Mota and follow up with patient.  RAVI GrimaldoN, RN - Breast Navigator

## 2023-12-20 ENCOUNTER — HOSPITAL ENCOUNTER (OUTPATIENT)
Dept: INFUSION THERAPY | Age: 68
Discharge: HOME | End: 2023-12-20
Payer: MEDICARE

## 2023-12-20 DIAGNOSIS — G45.9 TIA (TRANSIENT ISCHEMIC ATTACK): ICD-10-CM

## 2023-12-20 PROCEDURE — 36415 COLL VENOUS BLD VENIPUNCTURE: CPT

## 2023-12-20 PROCEDURE — 81241 F5 GENE: CPT

## 2023-12-20 PROCEDURE — 81240 F2 GENE: CPT

## 2023-12-20 PROCEDURE — 81291 MTHFR GENE: CPT

## 2023-12-20 PROCEDURE — 86147 CARDIOLIPIN ANTIBODY EA IG: CPT

## 2023-12-20 PROCEDURE — 85613 RUSSELL VIPER VENOM DILUTED: CPT

## 2023-12-20 PROCEDURE — 81400 MOPATH PROCEDURE LEVEL 1: CPT

## 2023-12-21 LAB — DILUTE RUSSELL VIPER VENOM TIME: NEGATIVE

## 2023-12-22 LAB
CARDIOLIPIN IGA SER IA-ACNC: 3.3 APL (ref 0–14)
CARDIOLIPIN IGG SER IA-ACNC: 0.9 GPL (ref 0–10)
CARDIOLIPIN IGM SER IA-ACNC: 2.3 MPL (ref 0–10)

## 2023-12-24 LAB
FACTOR XIII (F13A1) V34L VARIANT: NEGATIVE
FACTOR XIII VARIANT SPECIMEN: NORMAL

## 2023-12-25 LAB
F2 C.20210G>A GENO BLD/T: NEGATIVE
MTHFR INTERPRETATION: NORMAL
MTHFR MUTATION A1286C: NORMAL
MTHFR MUTATION C665T: NEGATIVE
SPECIMEN SOURCE: NORMAL
SPECIMEN: NORMAL

## 2023-12-26 LAB
F5 P.R506Q BLD/T QL: NEGATIVE
PAI-1 INTERPRETATION: NORMAL
PLASMINOGEN ACT. INHIBITOR-1 (PAI-1) SPECIMEN: NORMAL
SPECIMEN SOURCE: NORMAL

## 2024-01-04 PROBLEM — R41.3 MEMORY CHANGE: Status: RESOLVED | Noted: 2023-05-03 | Resolved: 2024-01-04

## 2024-01-04 PROBLEM — G43.109 VERTIGINOUS MIGRAINE: Status: RESOLVED | Noted: 2020-11-06 | Resolved: 2024-01-04

## 2024-01-04 PROBLEM — R29.90 STROKE-LIKE SYMPTOMS: Status: RESOLVED | Noted: 2023-11-07 | Resolved: 2024-01-04

## 2024-01-18 ENCOUNTER — OFFICE VISIT (OUTPATIENT)
Dept: BREAST CENTER | Age: 69
End: 2024-01-18
Payer: MEDICARE

## 2024-01-18 ENCOUNTER — HOSPITAL ENCOUNTER (OUTPATIENT)
Dept: GENERAL RADIOLOGY | Age: 69
Discharge: HOME | End: 2024-01-20
Payer: MEDICARE

## 2024-01-18 VITALS
RESPIRATION RATE: 20 BRPM | DIASTOLIC BLOOD PRESSURE: 70 MMHG | HEART RATE: 52 BPM | SYSTOLIC BLOOD PRESSURE: 106 MMHG | BODY MASS INDEX: 24.27 KG/M2 | HEIGHT: 66 IN | TEMPERATURE: 98.3 F | OXYGEN SATURATION: 97 % | WEIGHT: 151 LBS

## 2024-01-18 DIAGNOSIS — C50.912 MALIGNANT NEOPLASM OF LEFT BREAST IN FEMALE, ESTROGEN RECEPTOR POSITIVE, UNSPECIFIED SITE OF BREAST (HCC): ICD-10-CM

## 2024-01-18 DIAGNOSIS — Z80.0 FAMILY HISTORY OF MALIGNANT NEOPLASM OF DIGESTIVE ORGAN: ICD-10-CM

## 2024-01-18 DIAGNOSIS — Z80.3 FAMILY HISTORY OF MALIGNANT NEOPLASM OF BREAST: ICD-10-CM

## 2024-01-18 DIAGNOSIS — Z17.0 MALIGNANT NEOPLASM OF LEFT BREAST IN FEMALE, ESTROGEN RECEPTOR POSITIVE, UNSPECIFIED SITE OF BREAST (HCC): Primary | ICD-10-CM

## 2024-01-18 DIAGNOSIS — C50.912 MALIGNANT NEOPLASM OF LEFT FEMALE BREAST, UNSPECIFIED ESTROGEN RECEPTOR STATUS, UNSPECIFIED SITE OF BREAST (HCC): Primary | ICD-10-CM

## 2024-01-18 DIAGNOSIS — Z17.0 MALIGNANT NEOPLASM OF LEFT BREAST IN FEMALE, ESTROGEN RECEPTOR POSITIVE, UNSPECIFIED SITE OF BREAST (HCC): ICD-10-CM

## 2024-01-18 DIAGNOSIS — Z85.3 PERSONAL HISTORY OF MALIGNANT NEOPLASM OF BREAST: ICD-10-CM

## 2024-01-18 DIAGNOSIS — C50.912 MALIGNANT NEOPLASM OF LEFT BREAST IN FEMALE, ESTROGEN RECEPTOR POSITIVE, UNSPECIFIED SITE OF BREAST (HCC): Primary | ICD-10-CM

## 2024-01-18 PROBLEM — C50.919 BREAST CANCER (HCC): Status: ACTIVE | Noted: 2024-01-18

## 2024-01-18 LAB
ABSOLUTE IMMATURE GRANULOCYTE: <0.03 K/UL (ref 0–0.58)
ALBUMIN SERPL-MCNC: 4.3 G/DL (ref 3.5–5.2)
ALP BLD-CCNC: 51 U/L (ref 35–104)
ALT SERPL-CCNC: 15 U/L (ref 0–32)
ANION GAP SERPL CALCULATED.3IONS-SCNC: 16 MMOL/L (ref 7–16)
AST SERPL-CCNC: 26 U/L (ref 0–31)
BASOPHILS ABSOLUTE: 0.04 K/UL (ref 0–0.2)
BASOPHILS RELATIVE PERCENT: 1 % (ref 0–2)
BILIRUB SERPL-MCNC: 0.2 MG/DL (ref 0–1.2)
BUN BLDV-MCNC: 10 MG/DL (ref 6–23)
CALCIUM SERPL-MCNC: 9.1 MG/DL (ref 8.6–10.2)
CHLORIDE BLD-SCNC: 105 MMOL/L (ref 98–107)
CO2: 20 MMOL/L (ref 22–29)
CREAT SERPL-MCNC: 0.8 MG/DL (ref 0.5–1)
EOSINOPHILS ABSOLUTE: 0.2 K/UL (ref 0.05–0.5)
EOSINOPHILS RELATIVE PERCENT: 3 % (ref 0–6)
GFR SERPL CREATININE-BSD FRML MDRD: >60 ML/MIN/1.73M2
GLUCOSE BLD-MCNC: 110 MG/DL (ref 74–99)
HCT VFR BLD CALC: 39.3 % (ref 34–48)
HEMOGLOBIN: 13 G/DL (ref 11.5–15.5)
IMMATURE GRANULOCYTES: 0 % (ref 0–5)
LYMPHOCYTES ABSOLUTE: 2.27 K/UL (ref 1.5–4)
LYMPHOCYTES RELATIVE PERCENT: 33 % (ref 20–42)
MCH RBC QN AUTO: 32.7 PG (ref 26–35)
MCHC RBC AUTO-ENTMCNC: 33.1 G/DL (ref 32–34.5)
MCV RBC AUTO: 98.7 FL (ref 80–99.9)
MONOCYTES ABSOLUTE: 0.68 K/UL (ref 0.1–0.95)
MONOCYTES RELATIVE PERCENT: 10 % (ref 2–12)
NEUTROPHILS ABSOLUTE: 3.77 K/UL (ref 1.8–7.3)
NEUTROPHILS RELATIVE PERCENT: 54 % (ref 43–80)
PDW BLD-RTO: 13.2 % (ref 11.5–15)
PLATELET # BLD: 280 K/UL (ref 130–450)
PMV BLD AUTO: 9.5 FL (ref 7–12)
POTASSIUM SERPL-SCNC: 4.6 MMOL/L (ref 3.5–5)
RBC # BLD: 3.98 M/UL (ref 3.5–5.5)
SODIUM BLD-SCNC: 141 MMOL/L (ref 132–146)
TOTAL PROTEIN: 7.2 G/DL (ref 6.4–8.3)
WBC # BLD: 7 K/UL (ref 4.5–11.5)

## 2024-01-18 PROCEDURE — 36415 COLL VENOUS BLD VENIPUNCTURE: CPT | Performed by: SURGERY

## 2024-01-18 PROCEDURE — 99203 OFFICE O/P NEW LOW 30 MIN: CPT | Performed by: SURGERY

## 2024-01-18 PROCEDURE — 99204 OFFICE O/P NEW MOD 45 MIN: CPT | Performed by: SURGERY

## 2024-01-18 PROCEDURE — 71046 X-RAY EXAM CHEST 2 VIEWS: CPT

## 2024-01-18 PROCEDURE — 1123F ACP DISCUSS/DSCN MKR DOCD: CPT | Performed by: SURGERY

## 2024-01-18 NOTE — PATIENT INSTRUCTIONS
Patient will be scheduled for breast MRI once prior authorization is obtained    Radiation oncology referral will be submitted prior to surgery being scheduled    Patient will need PCP clearance prior to surgery being scheduled    Possible surgery:  Left breast mag seed localized lumpectomy / Left axillary sentinel lymph node biopsy

## 2024-01-18 NOTE — PROGRESS NOTES
Date of Visit: 1/18/2024  New Patient Invasive Breast Cancer    01/18/24      DIAGNOSIS:  1. (01/18/24) LEFT (7:00) RECURRENT invasive ductal carcinoma  Clinical stage: [id number]  ER (100) VA (80) HER2 (1+)  2. (2010) LEFT breast DCIS  3. Family history of cancer  * Paternal grandmother/breast- 60  * Paternal grandfather/breast- 80  * Paternal aunt/breast-60    TREATMENT  1. (2010) LEFT lumpectomy  2. (2010) RT  3. Tamoxifen deferred due to medical issues    IMAGING/PROCEDURES:  1. (11/20/23) RIGHT st-mammogram ([facility]): BIRADS-2  2. (11/20/23) LEFT dt-mammogram and US ([facility]): BIRADS-4  * LEFT (7:00) 1.1cm mass  * LEFT axilla normal  3. (12/07/23) LEFT US core biopsy        HISTORY OF PRESENT ILLNESS  Darlin Cobb is in the office today for a diagnosis of invasive carcinoma in the left breast.  Of note, she had a previous diagnosis of DCIS in the left breast in 2010.    Darlin underwent screening studies in November.  There was noted to be a 1 cm mass at the 7 o'clock position of the left breast.  The axilla appeared normal.  On December 7 she underwent left breast ultrasound core biopsy.  This revealed a diagnosis of invasive carcinoma.    The patient is in the office now to discuss the above and receive any additional recommendations.    BREAST SYMPTOMS  Darlin reports no symptoms.  Specifically, she has no palpable masses.  She has no nipple discharge or retraction.  She notes no skin retraction or discoloration.    PAST BREAST HISTORY  Darlin informs me that in 2010 she underwent breast conserving therapy for left breast DCIS.  She had a discussion regarding the benefits of hormonal therapy however this was discouraged due to her history of TIAs.  The patient also reports prior bilateral lumpectomies before the diagnosis of DCIS in 2010.    BREAST CANCER RISK FACTORS  Family history: The patient reports that her paternal grandmother had breast cancer in her 60s.  She also informs me that her paternal grandfather had

## 2024-01-22 ENCOUNTER — TELEPHONE (OUTPATIENT)
Dept: BREAST CENTER | Age: 69
End: 2024-01-22

## 2024-01-23 ENCOUNTER — TELEPHONE (OUTPATIENT)
Dept: BREAST CENTER | Age: 69
End: 2024-01-23

## 2024-01-23 NOTE — TELEPHONE ENCOUNTER
Authorization has been obtained for breast MRI 66021 -- Approval # [id number]  Valid 1/23/24 - 4/21/24 -- spoke with Nurse Reviewer - Ashlie pSarrow / [employer]

## 2024-01-25 ENCOUNTER — TELEPHONE (OUTPATIENT)
Dept: RADIATION ONCOLOGY | Age: 69
End: 2024-01-25

## 2024-01-25 ENCOUNTER — HOSPITAL ENCOUNTER (OUTPATIENT)
Dept: RADIATION ONCOLOGY | Age: 69
Discharge: HOME | End: 2024-01-25
Payer: MEDICARE

## 2024-01-25 ENCOUNTER — HOSPITAL ENCOUNTER (OUTPATIENT)
Dept: MRI IMAGING | Age: 69
Discharge: HOME | End: 2024-01-27
Attending: SURGERY
Payer: MEDICARE

## 2024-01-25 ENCOUNTER — TELEPHONE (OUTPATIENT)
Dept: CASE MANAGEMENT | Age: 69
End: 2024-01-25

## 2024-01-25 VITALS
OXYGEN SATURATION: 94 % | RESPIRATION RATE: 18 BRPM | DIASTOLIC BLOOD PRESSURE: 63 MMHG | WEIGHT: 153.2 LBS | TEMPERATURE: 97.2 F | BODY MASS INDEX: 24.73 KG/M2 | SYSTOLIC BLOOD PRESSURE: 105 MMHG | HEART RATE: 52 BPM

## 2024-01-25 DIAGNOSIS — C50.912 MALIGNANT NEOPLASM OF LEFT BREAST IN FEMALE, ESTROGEN RECEPTOR POSITIVE, UNSPECIFIED SITE OF BREAST (HCC): Primary | ICD-10-CM

## 2024-01-25 DIAGNOSIS — Z17.0 MALIGNANT NEOPLASM OF LEFT BREAST IN FEMALE, ESTROGEN RECEPTOR POSITIVE, UNSPECIFIED SITE OF BREAST (HCC): Primary | ICD-10-CM

## 2024-01-25 DIAGNOSIS — C50.912 MALIGNANT NEOPLASM OF LEFT FEMALE BREAST, UNSPECIFIED ESTROGEN RECEPTOR STATUS, UNSPECIFIED SITE OF BREAST (HCC): ICD-10-CM

## 2024-01-25 PROCEDURE — 6360000004 HC RX CONTRAST MEDICATION: Performed by: RADIOLOGY

## 2024-01-25 PROCEDURE — A9585 GADOBUTROL INJECTION: HCPCS | Performed by: RADIOLOGY

## 2024-01-25 PROCEDURE — C8908 MRI W/O FOL W/CONT, BREAST,: HCPCS

## 2024-01-25 PROCEDURE — 99205 OFFICE O/P NEW HI 60 MIN: CPT | Performed by: RADIOLOGY

## 2024-01-25 RX ORDER — GADOBUTROL 604.72 MG/ML
7 INJECTION INTRAVENOUS
Status: COMPLETED | OUTPATIENT
Start: 2024-01-25 | End: 2024-01-25

## 2024-01-25 RX ADMIN — GADOBUTROL 7 ML: 604.72 INJECTION INTRAVENOUS at 13:05

## 2024-01-25 SDOH — ECONOMIC STABILITY: HOUSING INSECURITY: PLEASE ASSESS YOUR PATIENT'S LEVEL OF DISTRESS CONCERNING HOUSING (SCALE FROM 1-10): 0 (NONE)

## 2024-01-25 ASSESSMENT — PAIN SCALES - GENERAL: PAINLEVEL_OUTOF10: 4

## 2024-01-25 ASSESSMENT — PAIN DESCRIPTION - FREQUENCY: FREQUENCY: CONTINUOUS

## 2024-01-25 ASSESSMENT — PAIN DESCRIPTION - LOCATION: LOCATION: SHOULDER

## 2024-01-25 ASSESSMENT — PAIN DESCRIPTION - PAIN TYPE: TYPE: CHRONIC PAIN

## 2024-01-25 NOTE — TELEPHONE ENCOUNTER
Met with patient and her , Jose Antonio, during her initial consultation with Dr. Neumann for radiation therapy for her recurrent left breast cancer diagnosis per Dr. Alexander for postoperative evaluation.  Introduced myself and explained my role with cancer patients receiving treatment within our cancer centers.  Patient was friendly and receptive.  Reviewed her that Breast MRI is scheduled this afternoon at [facility] per Dr. Alexander's orders and instructed her to arrive at 1245 for registration.  Verbalizes understanding.  Answered questions regarding genetic testing that Dr. Alexander completed due to her history of breast cancer twice to her apparent satisfaction.  Informed her that the genetic testing hasn't resulted yet.  Denies any problems with insurance coverage for medication or transportation for the daily treatments.  Reviewed additional ancillary services available at the center.  Denies any referral needs at this time.  Met with dietician at consultation today.  Patient scheduled for follow up with Dr. Richard on 1/31/2024 at [facility].  Patient verbalizes understanding and appreciative of nurse navigator visit.  Emotional support provided and greater than 25 minutes spent with patient.  Nurse navigator will continue to follow.  STEFAN Nowak, BSW, RN, OCN  Oncology Nurse Navigator

## 2024-01-25 NOTE — TELEPHONE ENCOUNTER
Darlin GRAMAJO Litz  1/25/2024  Ht Readings from Last 1 Encounters:   01/18/24 1.676 m (5' 6\")     Wt Readings from Last 10 Encounters:   01/25/24 69.5 kg (153 lb 3.2 oz)   01/18/24 68.5 kg (151 lb)   12/20/23 68.7 kg (151 lb 6.4 oz)   11/20/23 67.1 kg (148 lb)   11/07/23 68 kg (149 lb 14.6 oz)   10/16/23 69.8 kg (153 lb 14.4 oz)   07/25/23 69.4 kg (153 lb)   05/02/23 67.1 kg (148 lb)   11/03/22 66.1 kg (145 lb 12.8 oz)   11/03/22 65.3 kg (144 lb)     BMI=24.73    Assessment: Met with Darlin and her  while in for radiation consult with Dr. Neumann for left breast recurrent invasive ductal cancer. Darlin reports when she previously had radiation that she lost her taste for meat and never recovered from aversion to meat. Discussed other protein sources which she said she does peanut butter, dairy and eggs. Contact information provided for questions or concerns. Darlin follows with Dr. Richard for medical oncology.     Weight change: weight stable without significant weight loss. Had previously been on Trulicity and lost 20# over 3 years. Once she came off of Trulicity, she has gained about 10# over past 15 months.   Appetite: Not much appetite. Usually eats Pop Tart at breakfast, small lunch and dinner.   Nutritional Side Effects: dysgeusia to meat since she had radiation in 2010  Calculated Needs: 26-28 kcal/kg CBW = 2757-8011 kcal, 1.3-1.5 gm/kg/CBW =  gm pro, 1 ml/kcal = 6752-5013 ml fluids  Malnutrition Status: At risk    Recommendations: Add high calorie/protein snacks 1-2 per day to help meet nutritional needs during treatment to maintain weight.     Daisy Page RD

## 2024-01-25 NOTE — PROGRESS NOTES
Radiation Oncology      Arthur Neumann III, MD MS DABR   [facility]      Referring Physician: Dr. RINA Alexander      Primary Care Physician:Des Edge DO   Primary Oncologist: Dr. ISH Richard      Diagnosis: cT1b cNo cMo recurrent left breast cancer   -ER+   -IN+   -HER2-  *HO left breast caner s/p BCS and whole breast RT completed (4030 - [facility]) ~ 2011      Service:  Radiation Oncology consultation performed on 1/25/24        HPI:        Darlin is a pleasant 68 year old with early stage left breast cancer MRI pending - HO ipsi / previous BCS and whole breast RT.  She strongly desires breast conservation if feasible.  The patient presents today to discuss fractionated external beam radiation therapy as a component of multidisciplinary, adjuvant management.  We reviewed the available medical records including the complete medical history of this pt today prior to consultation. Epic -CE and available scanned documents per the Epic Media tab were reviewed PRN.  A complete ROS was also performed today and is noted below.  During consultation today I personally discussed the pts workup to date; including but not limited to applicable imaging studies, Pathology reports, and interventions.  The NCCN guidelines, as pertaining to the above diagnosis were also recapped for the pt today in brief.  Today, Darlin Cobb  notes Sx that include fatigue.   KPS 70.        -----    Per [facility]:         ASSESSMENT/PLAN :  1- A 67 year-old woman with DCIS involving the left breast, diagnosed in Nov 2010,status post excision with clear margins. She completed radiation to the left breast . Pt had positive ER and IN receptors. She was not a good candidate for Tamoxifen in the preventive setting because of severe bipolar disorder,and the fact that she has had in the past  multiple TIA  sand has been on aspirin and Persantine and declined hormonal therapy in the 
Osteopenia     Pancreatitis     Pericarditis 4213-2565    had several episodes during this time span    Pneumonia 10/1991    TIA (transient ischemic attack) 2008    no deficits       Past Surgical History:   Procedure Laterality Date    ABDOMINAL EXPLORATION SURGERY  2010    exploratory for scar tissue    APPENDECTOMY      BREAST LUMPECTOMY Left 10/14/2015    excision palpable left breast lesion     CARDIAC CATHETERIZATION      heart cath many yrs ago states was normal    CATARACT REMOVAL WITH IMPLANT Left 2/26/13    /with PC IOL    CATARACT REMOVAL WITH IMPLANT Right 3 5 13    CHOLECYSTECTOMY      CYSTOSCOPY  11/04/2016    retrograde pylogram/Dr. Grande    CYSTOSCOPY W BIOPSY OF BLADDER N/A 2/25/2019    CYSTOSCOPY, RETROGRADE, PYELOGRAM  AND CYSTOGRAM performed by Jonny Grande MD at [facility] OR    ENDOSCOPY, COLON, DIAGNOSTIC      Dr. Pan    FINGER TRIGGER RELEASE Right 7/16/2019    TRIGGER FINGER RELEASE RIGHT THUMB performed by Jeffrey Green DO at [facility] OR    HERNIA REPAIR      HYSTERECTOMY (CERVIX STATUS UNKNOWN)  1985    MECKEL DIVERTICULUM EXCISION      NERVE BLOCK Left 05/09/2017    left ileoinjuinal nerve block #1    NERVE BLOCK Left 05/23/2017    left illioinguinal nerve block #2    OTHER SURGICAL HISTORY      birthmark face and arm    OVARY SURGERY Bilateral 1986    removed    RECTOCELE REPAIR      SHOULDER ARTHROSCOPY Right 8/30/2019    RIGHT SHOULDER ARTHROSCOPY., BICEP TENOLYSIS  AND DEBRIDEMENT LABRUM AND ROTATOR CUFF performed by Jeffrey Green DO at [facility] OR    SKIN BIOPSY      mouth & under left breast    US BREAST BIOPSY W LOC DEVICE 1ST LESION LEFT Left 12/7/2023    US BREAST BIOPSY W LOC DEVICE 1ST LESION LEFT 12/7/2023 [facility] ULTRASOUND       Family History   Problem Relation Age of Onset    Migraines Mother     Kidney Disease Mother     Heart Disease Mother     Stroke Father     Cancer Brother 65        mouth cancer    Cancer Paternal Aunt         liver cancer    Breast Cancer

## 2024-01-26 LAB
Lab: NORMAL
Lab: NORMAL

## 2024-01-31 ENCOUNTER — TELEPHONE (OUTPATIENT)
Dept: BREAST CENTER | Age: 69
End: 2024-01-31

## 2024-01-31 ENCOUNTER — HOSPITAL ENCOUNTER (OUTPATIENT)
Dept: INFUSION THERAPY | Age: 69
Discharge: HOME | End: 2024-01-31

## 2024-01-31 ENCOUNTER — OFFICE VISIT (OUTPATIENT)
Dept: ONCOLOGY | Age: 69
End: 2024-01-31
Payer: MEDICARE

## 2024-01-31 VITALS
HEIGHT: 66 IN | TEMPERATURE: 97.2 F | WEIGHT: 152.4 LBS | DIASTOLIC BLOOD PRESSURE: 80 MMHG | BODY MASS INDEX: 24.49 KG/M2 | OXYGEN SATURATION: 96 % | HEART RATE: 54 BPM | SYSTOLIC BLOOD PRESSURE: 116 MMHG

## 2024-01-31 DIAGNOSIS — C50.612 MALIGNANT NEOPLASM OF AXILLARY TAIL OF LEFT BREAST IN FEMALE, ESTROGEN RECEPTOR POSITIVE (HCC): Primary | ICD-10-CM

## 2024-01-31 DIAGNOSIS — Z17.0 MALIGNANT NEOPLASM OF AXILLARY TAIL OF LEFT BREAST IN FEMALE, ESTROGEN RECEPTOR POSITIVE (HCC): Primary | ICD-10-CM

## 2024-01-31 PROCEDURE — 99212 OFFICE O/P EST SF 10 MIN: CPT

## 2024-01-31 NOTE — TELEPHONE ENCOUNTER
Patient called to let us know she has an appointment with Dr. Edge office today for surgery clearance is wondering if she needs a form to take with her. I informed the patient that we do not need a specific form. We do need a note from her doctor stating that she is cleared. Patient verbalized her understanding.     I also updated Dr. Edge office as well that we just need a note stating the patient is cleared. I also notified them of the type of surgery she will be having.

## 2024-01-31 NOTE — PROGRESS NOTES
[facility]  [address] [place] [address].   [place] [address]        Pt Name: Darlin Cobb  YOB: 1955  Date of evaluation: 1/31/2024  Primary Care Physician: Des Edge DO  Reason for evaluation:   No chief complaint on file.         Subjective:  Here for yearly follow-up.  Feels better than her hemoglobin is improved to the normal range.    Was C/O dizziness and giddiness,headaches .   MRI Brain was ordered by PCP.    It was negative except for mild sphenoid sinusitis.    C/O right shoulder pain. Received cortisone injection right shoulder by Dr. Green on 3/18/19. It helped her pain temporarily.   S/P right shoulder arthroscopy with subacromial arch decompression,  Labral debridement, Debridement of partial thickness rotator cuff tear, and  biceps tenotomy on 8/30/2019 by Dr. Jeffrey Green.        OBJECTIVE:  VITALS:  vitals were not taken for this visit.   Physical Exam:  Performance Status: 0  Well developed, well nourished female  EYES: sclera non-icteric   ENT: oropharynx clear.   NECK: No lymphadenopathy. Mild soft tissue swelling in the right neck without palpable lymphadenopathy.  BREASTS :Firmness and tenderness along lump in left breast with a small 0.5-1 cm nodule felt just inferior to scar.  HEART: Regular rate and rhythm.   LUNGS: Clear.  ABDOMEN: Soft, non-tender.  No mass or organomegaly.  EXTREMITIES: without edema.  NEUROLOGIC: No focal deficits.      Medications  Prior to Admission medications    Medication Sig Start Date End Date Taking? Authorizing Provider   oxyCODONE-acetaminophen (PERCOCET)  MG per tablet Take 1 tablet by mouth every 8 hours as needed. 12/14/23   ProviderPromise MD   Accu-Chek Softclix Lancets MISC use as directed 2 to 3 times a day 11/13/23   Promise Martinez MD   dicyclomine (BENTYL) 20 MG tablet Take 1 tablet by mouth 2 times daily 12/29/23   Promise Martinez MD B-D ULTRAFINE III SHORT PEN 31G X 8 MM MISC INJECT ONCE DAILY

## 2024-02-01 ENCOUNTER — TELEPHONE (OUTPATIENT)
Dept: BREAST CENTER | Age: 69
End: 2024-02-01

## 2024-02-01 ENCOUNTER — PREP FOR PROCEDURE (OUTPATIENT)
Dept: BREAST CENTER | Age: 69
End: 2024-02-01

## 2024-02-01 PROBLEM — C50.912 MALIGNANT NEOPLASM OF LEFT BREAST (HCC): Status: ACTIVE | Noted: 2024-02-01

## 2024-02-02 DIAGNOSIS — Z17.0 MALIGNANT NEOPLASM OF LEFT BREAST IN FEMALE, ESTROGEN RECEPTOR POSITIVE, UNSPECIFIED SITE OF BREAST (HCC): Primary | ICD-10-CM

## 2024-02-02 DIAGNOSIS — C50.912 MALIGNANT NEOPLASM OF LEFT BREAST IN FEMALE, ESTROGEN RECEPTOR POSITIVE, UNSPECIFIED SITE OF BREAST (HCC): Primary | ICD-10-CM

## 2024-02-02 NOTE — PROGRESS NOTES
I spoke to Dr. FAN Edge office, requested the clearance form be completed and re faxed to [facility].

## 2024-02-06 ENCOUNTER — TELEPHONE (OUTPATIENT)
Dept: BREAST CENTER | Age: 69
End: 2024-02-06

## 2024-02-06 NOTE — TELEPHONE ENCOUNTER
SPOKE WITH PATIENT:      Patient is scheduled for surgery  2/14/24 @ 12:15pm / Arrival 10:00am / Nuclear injection 11:30am Dr Alexander to inject / Mag seed placement 2/9/24 @ 11:00am [facility] --- Left breast mag seed localized lumpectomy - blue dye injection - left axillary sentinel lymph node biopsy - General trident/neoprobe.   Patient notified of date and time of surgery. Patient was instructed to enter the [facility] entrance of the hospital. Patient was instructed NPO after midnight the night prior to surgery.  Patient was instructed NO ASA products or blood thinners 3-5days prior to surgery.   Patient was instructed to bring a sports bra with her day of surgery.  Preadmission testing will contact patient prior to surgery, to go over medications and any additional testing that may need to be completed.  Medical Clearance has been obtained.  Post op visit 3/4/24 @ 10:00 am [facility].   No prior authorization required.

## 2024-02-09 ENCOUNTER — TELEPHONE (OUTPATIENT)
Dept: ONCOLOGY | Age: 69
End: 2024-02-09

## 2024-02-09 ENCOUNTER — HOSPITAL ENCOUNTER (OUTPATIENT)
Dept: GENERAL RADIOLOGY | Age: 69
End: 2024-02-09
Payer: MEDICARE

## 2024-02-09 DIAGNOSIS — Z17.0 MALIGNANT NEOPLASM OF LEFT BREAST IN FEMALE, ESTROGEN RECEPTOR POSITIVE, UNSPECIFIED SITE OF BREAST (HCC): ICD-10-CM

## 2024-02-09 DIAGNOSIS — C50.912 MALIGNANT NEOPLASM OF LEFT BREAST IN FEMALE, ESTROGEN RECEPTOR POSITIVE, UNSPECIFIED SITE OF BREAST (HCC): ICD-10-CM

## 2024-02-09 PROCEDURE — 77065 DX MAMMO INCL CAD UNI: CPT

## 2024-02-09 PROCEDURE — 19285 PERQ DEV BREAST 1ST US IMAG: CPT

## 2024-02-09 RX ORDER — SODIUM CHLORIDE 0.9 % (FLUSH) 0.9 %
5-40 SYRINGE (ML) INJECTION EVERY 12 HOURS SCHEDULED
Status: CANCELLED | OUTPATIENT
Start: 2024-02-09

## 2024-02-09 RX ORDER — SODIUM CHLORIDE 9 MG/ML
INJECTION, SOLUTION INTRAVENOUS PRN
Status: CANCELLED | OUTPATIENT
Start: 2024-02-09

## 2024-02-09 RX ORDER — SODIUM CHLORIDE, SODIUM LACTATE, POTASSIUM CHLORIDE, CALCIUM CHLORIDE 600; 310; 30; 20 MG/100ML; MG/100ML; MG/100ML; MG/100ML
INJECTION, SOLUTION INTRAVENOUS CONTINUOUS
Status: CANCELLED | OUTPATIENT
Start: 2024-02-09

## 2024-02-09 RX ORDER — SODIUM CHLORIDE 0.9 % (FLUSH) 0.9 %
5-40 SYRINGE (ML) INJECTION PRN
Status: CANCELLED | OUTPATIENT
Start: 2024-02-09

## 2024-02-09 NOTE — H&P (VIEW-ONLY)
DIAGNOSIS:  1. (01/18/24) LEFT (7:00) RECURRENT invasive ductal carcinoma  Clinical stage: [id number]  ER (100) [place] (80) HER2 (1+)  2. (2010) LEFT breast DCIS  3. Family history of cancer  * Paternal grandmother/breast- 60  * Paternal grandfather/breast- 80  * Paternal aunt/breast-60     TREATMENT  1. (2010) LEFT lumpectomy  2. (2010) RT  3. Tamoxifen deferred due to medical issues     IMAGING/PROCEDURES:  1. (11/20/23) RIGHT st-mammogram ([facility]): BIRADS-2  2. (11/20/23) LEFT dt-mammogram and US ([facility]): BIRADS-4  * LEFT (7:00) 1.1cm mass  * LEFT axilla normal  3. (12/07/23) LEFT US core biopsy         HISTORY OF PRESENT ILLNESS  Darlin Cobb presents for surgical management of her recurrent left breast cancer.    Darlin was treated for left breast DCIS in 2010.  On recent screening studies there is a 1 cm mass at the 7 o'clock position of the left breast.  Image guided biopsy revealed a diagnosis of invasive carcinoma.    BREAST SYMPTOMS  Darlin has no symptoms to report.    PAST BREAST HISTORY  Darlin underwent treatment for left breast DCIS in 2010.  She had lumpectomy and radiation.  Due to her history of TIAs she did not have hormonal therapy.  She had prior bilateral lumpectomies before the diagnosis of DCIS.    PAST MEDICAL/SURGICAL HISTORY  Past Medical History:   Diagnosis Date    Arthritis     spine    Asthma     Bipolar depression (HCC)     Breast cancer (HCC) 2010    breast left  ( lumpectomy and radiation)    Colitis     Diabetes mellitus (HCC)     Diabetic peripheral neuropathy (HCC)     of feet    Diverticulitis     Fatty liver     Fibromyalgia     GERD (gastroesophageal reflux disease)     Hiatal hernia     HSV-2 infection     herpes 2 genitalia    Hyperlipidemia     Hypertension     Migraines     Osteopenia     Pancreatitis     Pericarditis [id number]    had several episodes during this time span    Pneumonia 10/1991    TIA (transient ischemic attack) 2008    no deficits     Past Surgical History:   Procedure  adverse sequela with prior anesthetics.  She has no metallic plate screws or pins in her body.  She has no procedurally related to excessive bleeding.  She has no history of DVT or PE but does have a history of TIAs.    PHYSICAL EXAMINATION  Blood pressure 106/70.  Pulse 52  Temperature 98.3  Respiratory rate 20.  O2 saturation 97% on room air  BMI 24.37  The patient is pleasant and in no distress  Auscultation of the heart demonstrates a regular rate and rhythm without murmurs  Breath sounds clear  No edema in extremities  No gross neurologic deficits.    COMPREHENSIVE BREAST EXAMINATION  Breast examination demonstrates a remote incision in the upper central left breast.  There is some sequela of radiation.  The patient demonstrates no irregularities in either breast or axilla.    BREAST IMAGING STUDIES  Review the imaging studies demonstrates a small spiculated mass in the lower inner aspect of the left breast.    PATHOLOGY  Review the pathology report confirms the presence of invasive carcinoma.    ASSESSMENT AND PLAN  Darlin Cobb presents for surgical management of her recurrent left breast cancer.    Any further recommendations will be based on the upcoming surgical pathology report.      This note was created with voice recognition software.  Please excuse any grammatical errors that were not corrected and please contact me if there are any questions.    [email or web address]  [phone]

## 2024-02-09 NOTE — TELEPHONE ENCOUNTER
SW attempted to contact pt re: positive distress screen.  AURELIO LVM for pt requesting return call.      James Jackson MSW, TAMIW-S  Oncology Social Worker

## 2024-02-09 NOTE — PROGRESS NOTES
[facility].   Only one vehicle - per patient, is permitted in parking lot.   Upon entering the parking lot, a voucher ticket will print.    EDUCATION INSTRUCTIONS:           [] Pending sale to [facility] Pamphlet placed in chart.   [] Pre-admission Testing educational folder given  [] Incentive Spirometry,coughing & deep breathing exercises reviewed.   [] Fluoroscopy-Xray used in surgery reviewed with patient. Educational pamphlet placed in chart.  [] Pain: Post-op pain is normal and to be expected. You will be asked to rate your pain from 0-10.  [] Joint camp offered & Joint replacement booklets given.  [] Follow instruction sheet for Ensure/ Protein drinks - provided to you during PAT apt.    MEDICATION INSTRUCTIONS:    [x] Bring a complete list of your medications, please write the last time you took the medicine, give this list to the nurse in Pre-Op.  [x] Take only the following medications the morning of surgery with 1-2 ounces of water: bupropion, propranolol (prn), Prilosec  [x] Stop all herbal supplements and vitamins 5 days before surgery. Stop NSAIDS 7 days before surgery.  [x] DO NOT take any diabetic medicine the morning of surgery.  Follow instructions for insulin the day before surgery. Take 1/2 PM before surgery   [x] If you are diabetic and your blood sugar is low or you feel symptomatic, you may drink 1-2 ounces of apple juice or take a glucose tablet.            -The morning of your procedure, you may call the pre-op area if you have concerns about your blood sugar [phone].  [x] Use your inhalers the morning of surgery.  Bring your emergency inhaler with you day of surgery.  [x] Follow physician instructions regarding any blood thinners you may be taking.    WHAT TO EXPECT:    [x] The day of surgery you will be greeted and checked in by the [place] . In addition, you will be registered in the [employer] by a Patient Access Representative. Please bring your  photo ID and insurance card. A nurse will greet you in accordance to the time you are needed in the pre-op area to prepare you for surgery. Please do not be discouraged if you are not greeted in the order you arrive as there are many variables that are involved in patient preparation. Your patience is greatly appreciated as you wait for your nurse.   [x] Delays may occur with surgery and staff will make a sincere effort to keep you informed of delays. If any delays occur with your procedure, we apologize ahead of time for your inconvenience as we recognize the value of your time.

## 2024-02-09 NOTE — H&P
DIAGNOSIS:  1. (01/18/24) LEFT (7:00) RECURRENT invasive ductal carcinoma  Clinical stage: [id number]  ER (100) [place] (80) HER2 (1+)  2. (2010) LEFT breast DCIS  3. Family history of cancer  * Paternal grandmother/breast- 60  * Paternal grandfather/breast- 80  * Paternal aunt/breast-60     TREATMENT  1. (2010) LEFT lumpectomy  2. (2010) RT  3. Tamoxifen deferred due to medical issues     IMAGING/PROCEDURES:  1. (11/20/23) RIGHT st-mammogram ([facility]): BIRADS-2  2. (11/20/23) LEFT dt-mammogram and US ([facility]): BIRADS-4  * LEFT (7:00) 1.1cm mass  * LEFT axilla normal  3. (12/07/23) LEFT US core biopsy         HISTORY OF PRESENT ILLNESS  Darlin Cobb presents for surgical management of her recurrent left breast cancer.    Darlin was treated for left breast DCIS in 2010.  On recent screening studies there is a 1 cm mass at the 7 o'clock position of the left breast.  Image guided biopsy revealed a diagnosis of invasive carcinoma.    BREAST SYMPTOMS  Darlin has no symptoms to report.    PAST BREAST HISTORY  Darlin underwent treatment for left breast DCIS in 2010.  She had lumpectomy and radiation.  Due to her history of TIAs she did not have hormonal therapy.  She had prior bilateral lumpectomies before the diagnosis of DCIS.    PAST MEDICAL/SURGICAL HISTORY  Past Medical History:   Diagnosis Date    Arthritis     spine    Asthma     Bipolar depression (HCC)     Breast cancer (HCC) 2010    breast left  ( lumpectomy and radiation)    Colitis     Diabetes mellitus (HCC)     Diabetic peripheral neuropathy (HCC)     of feet    Diverticulitis     Fatty liver     Fibromyalgia     GERD (gastroesophageal reflux disease)     Hiatal hernia     HSV-2 infection     herpes 2 genitalia    Hyperlipidemia     Hypertension     Migraines     Osteopenia     Pancreatitis     Pericarditis [id number]    had several episodes during this time span    Pneumonia 10/1991    TIA (transient ischemic attack) 2008    no deficits     Past Surgical History:   Procedure

## 2024-02-13 ENCOUNTER — TELEPHONE (OUTPATIENT)
Dept: ONCOLOGY | Age: 69
End: 2024-02-13

## 2024-02-13 NOTE — TELEPHONE ENCOUNTER
SW received return call from pt.  SW reviewed positive distress screen and pt discussed having some concerns financially at this time.  Pt was informed that she could complete a FA application for 1/25/24 at this time. Pt was advised that she can ask for all payments to be billed while she applies for financial aid after stating that she would be in the range for a discount.  Pt stated that she would follow up with SW after she started to feel better after her surgery tomorrow.  Pt denied any other needs at this time and was encouraged to reach out to this provider should any needs arise.          James Jackson MSW, TAMIW-S  Oncology Social Worker

## 2024-02-14 ENCOUNTER — ANESTHESIA (OUTPATIENT)
Dept: OPERATING ROOM | Age: 69
End: 2024-02-14
Payer: MEDICARE

## 2024-02-14 ENCOUNTER — APPOINTMENT (OUTPATIENT)
Dept: GENERAL RADIOLOGY | Age: 69
End: 2024-02-14
Payer: MEDICARE

## 2024-02-14 ENCOUNTER — ANESTHESIA EVENT (OUTPATIENT)
Dept: OPERATING ROOM | Age: 69
End: 2024-02-14
Payer: MEDICARE

## 2024-02-14 ENCOUNTER — HOSPITAL ENCOUNTER (OUTPATIENT)
Dept: GENERAL RADIOLOGY | Age: 69
Setting detail: OUTPATIENT SURGERY
Discharge: HOME | End: 2024-02-16
Attending: SURGERY
Payer: MEDICARE

## 2024-02-14 ENCOUNTER — HOSPITAL ENCOUNTER (OUTPATIENT)
Age: 69
Setting detail: OUTPATIENT SURGERY
Discharge: HOME | End: 2024-02-14
Attending: SURGERY | Admitting: SURGERY
Payer: MEDICARE

## 2024-02-14 ENCOUNTER — HOSPITAL ENCOUNTER (OUTPATIENT)
Dept: NUCLEAR MEDICINE | Age: 69
Discharge: HOME | End: 2024-02-16
Payer: MEDICARE

## 2024-02-14 VITALS
HEART RATE: 76 BPM | BODY MASS INDEX: 24.11 KG/M2 | RESPIRATION RATE: 27 BRPM | HEIGHT: 66 IN | DIASTOLIC BLOOD PRESSURE: 84 MMHG | SYSTOLIC BLOOD PRESSURE: 114 MMHG | WEIGHT: 150 LBS | OXYGEN SATURATION: 94 % | TEMPERATURE: 98 F

## 2024-02-14 DIAGNOSIS — C50.912 MALIGNANT NEOPLASM OF LEFT BREAST IN FEMALE, ESTROGEN RECEPTOR POSITIVE, UNSPECIFIED SITE OF BREAST (HCC): ICD-10-CM

## 2024-02-14 DIAGNOSIS — R29.818 TRANSIENT NEUROLOGICAL SYMPTOMS: ICD-10-CM

## 2024-02-14 DIAGNOSIS — Z17.0 MALIGNANT NEOPLASM OF LEFT BREAST IN FEMALE, ESTROGEN RECEPTOR POSITIVE, UNSPECIFIED SITE OF BREAST (HCC): ICD-10-CM

## 2024-02-14 DIAGNOSIS — Z01.812 PRE-OPERATIVE LABORATORY EXAMINATION: Primary | ICD-10-CM

## 2024-02-14 DIAGNOSIS — C50.912 MALIGNANT NEOPLASM OF LEFT BREAST (HCC): ICD-10-CM

## 2024-02-14 LAB
ANION GAP SERPL CALCULATED.3IONS-SCNC: 9 MMOL/L (ref 7–16)
BUN SERPL-MCNC: 14 MG/DL (ref 6–23)
CALCIUM SERPL-MCNC: 9.2 MG/DL (ref 8.6–10.2)
CHLORIDE SERPL-SCNC: 102 MMOL/L (ref 98–107)
CHP ED QC CHECK: 86
CO2 SERPL-SCNC: 25 MMOL/L (ref 22–29)
CREAT SERPL-MCNC: 0.9 MG/DL (ref 0.5–1)
ERYTHROCYTE [DISTWIDTH] IN BLOOD BY AUTOMATED COUNT: 12.9 % (ref 11.5–15)
GFR SERPL CREATININE-BSD FRML MDRD: >60 ML/MIN/1.73M2
GLUCOSE BLD-MCNC: 86 MG/DL (ref 74–99)
GLUCOSE BLD-MCNC: NORMAL MG/DL
GLUCOSE SERPL-MCNC: 90 MG/DL (ref 74–99)
HCT VFR BLD AUTO: 37.2 % (ref 34–48)
HGB BLD-MCNC: 12.4 G/DL (ref 11.5–15.5)
MCH RBC QN AUTO: 32.4 PG (ref 26–35)
MCHC RBC AUTO-ENTMCNC: 33.3 G/DL (ref 32–34.5)
MCV RBC AUTO: 97.1 FL (ref 80–99.9)
PLATELET # BLD AUTO: 216 K/UL (ref 130–450)
PMV BLD AUTO: 9.3 FL (ref 7–12)
POTASSIUM SERPL-SCNC: 4.2 MMOL/L (ref 3.5–5)
RBC # BLD AUTO: 3.83 M/UL (ref 3.5–5.5)
SODIUM SERPL-SCNC: 136 MMOL/L (ref 132–146)
WBC OTHER # BLD: 6.5 K/UL (ref 4.5–11.5)

## 2024-02-14 PROCEDURE — 2500000003 HC RX 250 WO HCPCS

## 2024-02-14 PROCEDURE — 6360000002 HC RX W HCPCS: Performed by: SURGERY

## 2024-02-14 PROCEDURE — 88342 IMHCHEM/IMCYTCHM 1ST ANTB: CPT

## 2024-02-14 PROCEDURE — 3700000001 HC ADD 15 MINUTES (ANESTHESIA): Performed by: SURGERY

## 2024-02-14 PROCEDURE — 80048 BASIC METABOLIC PNL TOTAL CA: CPT

## 2024-02-14 PROCEDURE — 76098 X-RAY EXAM SURGICAL SPECIMEN: CPT

## 2024-02-14 PROCEDURE — 7100000011 HC PHASE II RECOVERY - ADDTL 15 MIN: Performed by: SURGERY

## 2024-02-14 PROCEDURE — 2580000003 HC RX 258

## 2024-02-14 PROCEDURE — 88305 TISSUE EXAM BY PATHOLOGIST: CPT

## 2024-02-14 PROCEDURE — 3430000000 HC RX DIAGNOSTIC RADIOPHARMACEUTICAL: Performed by: RADIOLOGY

## 2024-02-14 PROCEDURE — 7100000000 HC PACU RECOVERY - FIRST 15 MIN: Performed by: SURGERY

## 2024-02-14 PROCEDURE — 3700000000 HC ANESTHESIA ATTENDED CARE: Performed by: SURGERY

## 2024-02-14 PROCEDURE — 2500000003 HC RX 250 WO HCPCS: Performed by: ANESTHESIOLOGY

## 2024-02-14 PROCEDURE — 82962 GLUCOSE BLOOD TEST: CPT

## 2024-02-14 PROCEDURE — 7100000010 HC PHASE II RECOVERY - FIRST 15 MIN: Performed by: SURGERY

## 2024-02-14 PROCEDURE — 85027 COMPLETE CBC AUTOMATED: CPT

## 2024-02-14 PROCEDURE — 88307 TISSUE EXAM BY PATHOLOGIST: CPT

## 2024-02-14 PROCEDURE — A9520 TC99 TILMANOCEPT DIAG 0.5MCI: HCPCS | Performed by: RADIOLOGY

## 2024-02-14 PROCEDURE — 3600000006 HC SURGERY LEVEL 6 BASE: Performed by: SURGERY

## 2024-02-14 PROCEDURE — 2580000003 HC RX 258: Performed by: SURGERY

## 2024-02-14 PROCEDURE — 3600000016 HC SURGERY LEVEL 6 ADDTL 15MIN: Performed by: SURGERY

## 2024-02-14 PROCEDURE — 7100000001 HC PACU RECOVERY - ADDTL 15 MIN: Performed by: SURGERY

## 2024-02-14 PROCEDURE — 38792 RA TRACER ID OF SENTINL NODE: CPT

## 2024-02-14 PROCEDURE — 6360000002 HC RX W HCPCS

## 2024-02-14 PROCEDURE — 2500000003 HC RX 250 WO HCPCS: Performed by: SURGERY

## 2024-02-14 PROCEDURE — 2709999900 HC NON-CHARGEABLE SUPPLY: Performed by: SURGERY

## 2024-02-14 RX ORDER — METHYLENE BLUE 10 MG/ML
INJECTION INTRAVENOUS PRN
Status: DISCONTINUED | OUTPATIENT
Start: 2024-02-14 | End: 2024-02-14 | Stop reason: ALTCHOICE

## 2024-02-14 RX ORDER — LIDOCAINE HYDROCHLORIDE 20 MG/ML
INJECTION, SOLUTION INTRAVENOUS PRN
Status: DISCONTINUED | OUTPATIENT
Start: 2024-02-14 | End: 2024-02-14 | Stop reason: SDUPTHER

## 2024-02-14 RX ORDER — SODIUM CHLORIDE, SODIUM LACTATE, POTASSIUM CHLORIDE, CALCIUM CHLORIDE 600; 310; 30; 20 MG/100ML; MG/100ML; MG/100ML; MG/100ML
INJECTION, SOLUTION INTRAVENOUS CONTINUOUS PRN
Status: DISCONTINUED | OUTPATIENT
Start: 2024-02-14 | End: 2024-02-14 | Stop reason: SDUPTHER

## 2024-02-14 RX ORDER — MIDAZOLAM HYDROCHLORIDE 1 MG/ML
INJECTION INTRAMUSCULAR; INTRAVENOUS PRN
Status: DISCONTINUED | OUTPATIENT
Start: 2024-02-14 | End: 2024-02-14 | Stop reason: SDUPTHER

## 2024-02-14 RX ORDER — HYDROMORPHONE HYDROCHLORIDE 1 MG/ML
0.5 INJECTION, SOLUTION INTRAMUSCULAR; INTRAVENOUS; SUBCUTANEOUS EVERY 5 MIN PRN
Status: DISCONTINUED | OUTPATIENT
Start: 2024-02-14 | End: 2024-02-14 | Stop reason: HOSPADM

## 2024-02-14 RX ORDER — SODIUM CHLORIDE, SODIUM LACTATE, POTASSIUM CHLORIDE, CALCIUM CHLORIDE 600; 310; 30; 20 MG/100ML; MG/100ML; MG/100ML; MG/100ML
INJECTION, SOLUTION INTRAVENOUS CONTINUOUS
Status: DISCONTINUED | OUTPATIENT
Start: 2024-02-14 | End: 2024-02-14 | Stop reason: HOSPADM

## 2024-02-14 RX ORDER — ONDANSETRON 2 MG/ML
INJECTION INTRAMUSCULAR; INTRAVENOUS PRN
Status: DISCONTINUED | OUTPATIENT
Start: 2024-02-14 | End: 2024-02-14 | Stop reason: SDUPTHER

## 2024-02-14 RX ORDER — SODIUM CHLORIDE 0.9 % (FLUSH) 0.9 %
5-40 SYRINGE (ML) INJECTION PRN
Status: DISCONTINUED | OUTPATIENT
Start: 2024-02-14 | End: 2024-02-14 | Stop reason: HOSPADM

## 2024-02-14 RX ORDER — SODIUM CHLORIDE 0.9 % (FLUSH) 0.9 %
5-40 SYRINGE (ML) INJECTION EVERY 12 HOURS SCHEDULED
Status: DISCONTINUED | OUTPATIENT
Start: 2024-02-14 | End: 2024-02-14 | Stop reason: HOSPADM

## 2024-02-14 RX ORDER — FENTANYL CITRATE 50 UG/ML
INJECTION, SOLUTION INTRAMUSCULAR; INTRAVENOUS PRN
Status: DISCONTINUED | OUTPATIENT
Start: 2024-02-14 | End: 2024-02-14 | Stop reason: SDUPTHER

## 2024-02-14 RX ORDER — PROPOFOL 10 MG/ML
INJECTION, EMULSION INTRAVENOUS PRN
Status: DISCONTINUED | OUTPATIENT
Start: 2024-02-14 | End: 2024-02-14 | Stop reason: SDUPTHER

## 2024-02-14 RX ORDER — SODIUM CHLORIDE 9 MG/ML
INJECTION, SOLUTION INTRAVENOUS PRN
Status: DISCONTINUED | OUTPATIENT
Start: 2024-02-14 | End: 2024-02-14 | Stop reason: HOSPADM

## 2024-02-14 RX ORDER — DEXAMETHASONE SODIUM PHOSPHATE 10 MG/ML
INJECTION INTRAMUSCULAR; INTRAVENOUS PRN
Status: DISCONTINUED | OUTPATIENT
Start: 2024-02-14 | End: 2024-02-14 | Stop reason: SDUPTHER

## 2024-02-14 RX ORDER — EPHEDRINE SULFATE/0.9% NACL/PF 50 MG/5 ML
SYRINGE (ML) INTRAVENOUS PRN
Status: DISCONTINUED | OUTPATIENT
Start: 2024-02-14 | End: 2024-02-14 | Stop reason: SDUPTHER

## 2024-02-14 RX ORDER — MEPERIDINE HYDROCHLORIDE 25 MG/ML
12.5 INJECTION INTRAMUSCULAR; INTRAVENOUS; SUBCUTANEOUS EVERY 5 MIN PRN
Status: DISCONTINUED | OUTPATIENT
Start: 2024-02-14 | End: 2024-02-14 | Stop reason: HOSPADM

## 2024-02-14 RX ORDER — SODIUM CHLORIDE 9 MG/ML
INJECTION, SOLUTION INTRAVENOUS CONTINUOUS PRN
Status: DISCONTINUED | OUTPATIENT
Start: 2024-02-14 | End: 2024-02-14 | Stop reason: SDUPTHER

## 2024-02-14 RX ORDER — ROCURONIUM BROMIDE 10 MG/ML
INJECTION, SOLUTION INTRAVENOUS PRN
Status: DISCONTINUED | OUTPATIENT
Start: 2024-02-14 | End: 2024-02-14 | Stop reason: SDUPTHER

## 2024-02-14 RX ORDER — GLYCOPYRROLATE 0.2 MG/ML
INJECTION INTRAMUSCULAR; INTRAVENOUS PRN
Status: DISCONTINUED | OUTPATIENT
Start: 2024-02-14 | End: 2024-02-14 | Stop reason: SDUPTHER

## 2024-02-14 RX ADMIN — TILMANOCEPT 570 MICRO CURIE: KIT at 12:10

## 2024-02-14 RX ADMIN — FENTANYL CITRATE 50 MCG: 0.05 INJECTION, SOLUTION INTRAMUSCULAR; INTRAVENOUS at 13:29

## 2024-02-14 RX ADMIN — SODIUM CHLORIDE, POTASSIUM CHLORIDE, SODIUM LACTATE AND CALCIUM CHLORIDE: 600; 310; 30; 20 INJECTION, SOLUTION INTRAVENOUS at 13:12

## 2024-02-14 RX ADMIN — SODIUM CHLORIDE: 9 INJECTION, SOLUTION INTRAVENOUS at 12:29

## 2024-02-14 RX ADMIN — LIDOCAINE HYDROCHLORIDE 80 MG: 20 INJECTION, SOLUTION INTRAVENOUS at 12:29

## 2024-02-14 RX ADMIN — SODIUM CHLORIDE: 9 INJECTION, SOLUTION INTRAVENOUS at 09:56

## 2024-02-14 RX ADMIN — ONDANSETRON HYDROCHLORIDE 4 MG: 2 SOLUTION INTRAMUSCULAR; INTRAVENOUS at 13:53

## 2024-02-14 RX ADMIN — Medication 10 MG: at 13:13

## 2024-02-14 RX ADMIN — PROPOFOL 200 MG: 10 INJECTION, EMULSION INTRAVENOUS at 12:29

## 2024-02-14 RX ADMIN — SUGAMMADEX 200 MG: 100 INJECTION, SOLUTION INTRAVENOUS at 14:00

## 2024-02-14 RX ADMIN — FENTANYL CITRATE 50 MCG: 0.05 INJECTION, SOLUTION INTRAMUSCULAR; INTRAVENOUS at 12:51

## 2024-02-14 RX ADMIN — HYDROMORPHONE HYDROCHLORIDE 0.5 MG: 1 INJECTION, SOLUTION INTRAMUSCULAR; INTRAVENOUS; SUBCUTANEOUS at 15:23

## 2024-02-14 RX ADMIN — ROCURONIUM BROMIDE 10 MG: 10 INJECTION, SOLUTION INTRAVENOUS at 13:25

## 2024-02-14 RX ADMIN — MIDAZOLAM 2 MG: 1 INJECTION INTRAMUSCULAR; INTRAVENOUS at 12:29

## 2024-02-14 RX ADMIN — DEXAMETHASONE SODIUM PHOSPHATE 10 MG: 10 INJECTION INTRAMUSCULAR; INTRAVENOUS at 12:41

## 2024-02-14 RX ADMIN — ROCURONIUM BROMIDE 40 MG: 10 INJECTION, SOLUTION INTRAVENOUS at 12:29

## 2024-02-14 RX ADMIN — FENTANYL CITRATE 100 MCG: 0.05 INJECTION, SOLUTION INTRAMUSCULAR; INTRAVENOUS at 12:29

## 2024-02-14 RX ADMIN — CEFAZOLIN 2000 MG: 2 INJECTION, POWDER, FOR SOLUTION INTRAMUSCULAR; INTRAVENOUS at 12:39

## 2024-02-14 RX ADMIN — Medication 10 MG: at 13:14

## 2024-02-14 RX ADMIN — GLYCOPYRROLATE 0.2 MG: 0.2 INJECTION INTRAMUSCULAR; INTRAVENOUS at 13:08

## 2024-02-14 ASSESSMENT — PAIN DESCRIPTION - LOCATION
LOCATION: BREAST
LOCATION: BREAST

## 2024-02-14 ASSESSMENT — PAIN SCALES - GENERAL
PAINLEVEL_OUTOF10: 4
PAINLEVEL_OUTOF10: 6

## 2024-02-14 ASSESSMENT — PAIN DESCRIPTION - DESCRIPTORS
DESCRIPTORS: DISCOMFORT;ACHING
DESCRIPTORS: ACHING
DESCRIPTORS: ACHING;DISCOMFORT

## 2024-02-14 ASSESSMENT — PAIN - FUNCTIONAL ASSESSMENT
PAIN_FUNCTIONAL_ASSESSMENT: 0-10
PAIN_FUNCTIONAL_ASSESSMENT: ADULT NONVERBAL PAIN SCALE (NPVS)

## 2024-02-14 ASSESSMENT — PAIN DESCRIPTION - PAIN TYPE
TYPE: SURGICAL PAIN
TYPE: SURGICAL PAIN

## 2024-02-14 ASSESSMENT — PAIN DESCRIPTION - ONSET
ONSET: ON-GOING
ONSET: ON-GOING

## 2024-02-14 ASSESSMENT — PAIN DESCRIPTION - ORIENTATION
ORIENTATION: LEFT
ORIENTATION: LEFT

## 2024-02-14 ASSESSMENT — PAIN DESCRIPTION - FREQUENCY
FREQUENCY: INTERMITTENT
FREQUENCY: INTERMITTENT

## 2024-02-14 NOTE — ANESTHESIA PRE PROCEDURE
Smoking status: Never    Smokeless tobacco: Never   Substance Use Topics    Alcohol use: No                                Counseling given: Not Answered      Vital Signs (Current):   Vitals:    02/09/24 0833 02/09/24 0834 02/14/24 0921   BP:   129/62   Pulse:   51   Resp:   16   Temp:   36.1 °C (97 °F)   TempSrc:   Temporal   SpO2:   96%   Weight:  68 kg (150 lb) 68 kg (150 lb)   Height: 1.676 m (5' 6\") 1.676 m (5' 6\") 1.676 m (5' 6\")                                              BP Readings from Last 3 Encounters:   02/14/24 129/62   01/31/24 116/80   01/25/24 105/63       NPO Status: Time of last liquid consumption: 0500                        Time of last solid consumption: 2100                        Date of last liquid consumption: 02/14/24                        Date of last solid food consumption: 02/13/24    BMI:   Wt Readings from Last 3 Encounters:   02/14/24 68 kg (150 lb)   01/31/24 69.1 kg (152 lb 6.4 oz)   01/25/24 69.5 kg (153 lb 3.2 oz)     Body mass index is 24.21 kg/m².    CBC:   Lab Results   Component Value Date/Time    WBC 7.0 01/18/2024 10:50 AM    RBC 3.98 01/18/2024 10:50 AM    HGB 13.0 01/18/2024 10:50 AM    HCT 39.3 01/18/2024 10:50 AM    MCV 98.7 01/18/2024 10:50 AM    RDW 13.2 01/18/2024 10:50 AM     01/18/2024 10:50 AM       CMP:   Lab Results   Component Value Date/Time     01/18/2024 10:50 AM    K 4.6 01/18/2024 10:50 AM    K 4.1 11/03/2022 04:33 AM     01/18/2024 10:50 AM    CO2 20 01/18/2024 10:50 AM    BUN 10 01/18/2024 10:50 AM    CREATININE 0.8 01/18/2024 10:50 AM    GFRAA >60 10/07/2022 10:59 AM    LABGLOM >60 01/18/2024 10:50 AM    GLUCOSE 110 01/18/2024 10:50 AM    GLUCOSE 170 02/08/2012 02:22 PM    PROT 7.2 01/18/2024 10:50 AM    CALCIUM 9.1 01/18/2024 10:50 AM    BILITOT 0.2 01/18/2024 10:50 AM    ALKPHOS 51 01/18/2024 10:50 AM    AST 26 01/18/2024 10:50 AM    ALT 15 01/18/2024 10:50 AM       POC Tests: No results for input(s): \"POCGLU\", \"POCNA\", \"POCK\",

## 2024-02-14 NOTE — OP NOTE
Operative Note      Patient: Darlin Cobb  YOB: 1955  MRN: [id number]    Date of Procedure: 2/14/2024    Pre-Op Diagnosis Codes:     * Malignant neoplasm of left breast (HCC) [C50.912]    Post-Op Diagnosis: Same       Procedure(s):  1. Intraoperative injection of LEFT breast for sentinel node mapping  2. LEFT breast Mag Seed localized lumpectomy  3. LEFT axillary sentinel node biopsy    Surgeon(s):  Joseph Alexander MD    Assistant:   Resident: Blanca Mcclure DO    Anesthesia: General    Estimated Blood Loss (mL): 20cc    Complications: None    Specimens:   ID Type Source Tests Collected by Time Destination   A : Left breast lumpectomy Tissue Tissue SURGICAL PATHOLOGY Joseph Alexander MD 2/14/2024 1314    B : Left breast new inferior, stitch marks new margin Tissue Tissue SURGICAL PATHOLOGY Joseph Alexander MD 2/14/2024 1324    C : Left breast, medial margin, stitch marks new margin Tissue Tissue SURGICAL PATHOLOGY Joseph Alexander MD 2/14/2024 1326    D : Non sentinal lymph node Tissue Tissue SURGICAL PATHOLOGY Joseph Alexander MD 2/14/2024 1344        Detailed Description of Procedure:   Procedure note:  1.  Intraoperative injection of blue dye for sentinel lymph node mapping  2.  Left breast Magseed localized lumpectomy  3.  Left axillary sentinel lymph node biopsy    In the days leading up to the procedure the patient underwent successful Magseed localization for left breast tumor.  In the preoperative holding area the periareolar tissue was cleansed with alcohol and radionucleotide was injected.    The patient was then brought to the operating room placed supine on the operating table and general anesthesia was delivered.  The periareolar tissue was again cleansed with alcohol and dilute methylene blue was injected.  The breast was massaged for 3 minutes.  A sterile field was then established with Hibiclens paint and the appropriate towels and drapes around the entire left torso.    We began

## 2024-02-14 NOTE — ANESTHESIA POSTPROCEDURE EVALUATION
Department of Anesthesiology  Postprocedure Note    Patient: Darlin Cobb  MRN: [id number]  YOB: 1955  Date of evaluation: 2/15/2024    Procedure Summary       Date: 02/14/24 Room / Location: [address]    Anesthesia Start: 1229 Anesthesia Stop:     Procedure: 1. Intraoperative injection of LEFT breast for sentinel node mapping  2. LEFT breast Mag Seed localized lumpectomy  3. LEFT axillary sentinel node biopsy (Left: Breast) Diagnosis:       Malignant neoplasm of left breast (HCC)      (Malignant neoplasm of left breast (HCC) [C50.912])    Surgeons: Joseph Alexander MD Responsible Provider: Tk Tang MD    Anesthesia Type: general ASA Status: 3            Anesthesia Type: General    Kizzy Phase I: Kizzy Score: 10    Kizzy Phase II: Kizzy Score: 10    Anesthesia Post Evaluation    Patient location during evaluation: PACU  Patient participation: complete - patient participated  Level of consciousness: awake  Pain score: 3  Airway patency: patent  Nausea & Vomiting: no nausea and no vomiting  Cardiovascular status: blood pressure returned to baseline  Respiratory status: acceptable  Hydration status: euvolemic    No notable events documented.

## 2024-02-14 NOTE — DISCHARGE INSTRUCTIONS
Breast Surgery Post-Op Instructions: LUMPECTOMY +/- SENTINEL NODE BIOPSY      Diet: No restrictions  OKAY TO START TAKING YOUR PLAVIX AGAIN 2/15/24 (TOMORROW)    Activity:  The recommendation is for light activity only for 2 weeks.  Please wear a supportive bra for the majority of the time including bed time.  You will be able to perform all the minor activities of daily living on your own.  It is recommended that you avoid strenuous activity which would include housework, yard work and exercise.  If you experience any excessive coughing or vomiting related to the procedure or anesthesia please call us in that this could increase the chance of complications    Return to work:  If your job is strenuous the recommendation is you take at least 2 weeks off of work.  You can contact the office in the next several days for instructions regarding returning to work.    Driving:  The recommendation is that you avoid driving for 2 days.  You should not drive if you are still taking pain medication.    Bathing/showering:  It is okay if the incision(s) get wet or exposed to soap in 24 hrs.  Until then the recommendation is that you keep it dry.  When you do take your 1st bath or shower do not scrub the incision(s).  The recommendation is that you simply pat it dry.    Incision maintenance:  There is no maintenance required for your incision(s).    What to look for:  There is a small chance you will develop either a seroma, an infection or hematoma.  Evidence that you might be experiencing a problem will be swelling and/or redness and/or bruising and/or worsening pain.    A seroma is simply a collection of fluid at the surgical site.  There will be swelling but there usually is no discoloration.    An infection will result in swelling, redness and increased pain.    A hematoma is a collection of blood at the surgical site.  This will result in swelling, a purple discoloration, firmness and increased pain.    If you had a lymph

## 2024-02-14 NOTE — INTERVAL H&P NOTE
Update History & Physical    The patient's History and Physical of February 9, 2024 was reviewed with the patient and I examined the patient. There was no change. The surgical site was confirmed by the patient and me.     Plan: The risks, benefits, expected outcome, and alternative to the recommended procedure have been discussed with the patient. Patient understands and wants to proceed with the procedure.     Electronically signed by Blanca Mcclure DO on 2/14/2024 at 10:28 AM

## 2024-02-19 ENCOUNTER — TELEPHONE (OUTPATIENT)
Dept: BREAST CENTER | Age: 69
End: 2024-02-19

## 2024-02-19 NOTE — TELEPHONE ENCOUNTER
Patient called our office -- she had surgery 2/14/24 and wanted to let doctor know that she is feeling really good and only had to take pain medication on Wednesday and Thursday.

## 2024-02-20 LAB — SURGICAL PATHOLOGY REPORT: NORMAL

## 2024-02-28 ENCOUNTER — TELEPHONE (OUTPATIENT)
Dept: ONCOLOGY | Age: 69
End: 2024-02-28

## 2024-02-28 NOTE — TELEPHONE ENCOUNTER
AURELIO contacted pt at the request of cancer center staff re: FA application.  Pt reported that she will be back in office on 3/11/24 and Sw explained that this could all be figured out when she came into office and discussed materials she would need to bring to apply.  Pt reported that she will do that and plans to meet with SW on 3/11/24.      James Jackson MSW, BEN-S  Oncology Social Worker

## 2024-03-04 ENCOUNTER — OFFICE VISIT (OUTPATIENT)
Dept: BREAST CENTER | Age: 69
End: 2024-03-04

## 2024-03-04 ENCOUNTER — TELEPHONE (OUTPATIENT)
Dept: BREAST CENTER | Age: 69
End: 2024-03-04

## 2024-03-04 VITALS
OXYGEN SATURATION: 97 % | TEMPERATURE: 97.7 F | SYSTOLIC BLOOD PRESSURE: 110 MMHG | HEIGHT: 66 IN | RESPIRATION RATE: 20 BRPM | DIASTOLIC BLOOD PRESSURE: 60 MMHG | BODY MASS INDEX: 23.63 KG/M2 | HEART RATE: 51 BPM | WEIGHT: 147 LBS

## 2024-03-04 DIAGNOSIS — Z12.31 VISIT FOR SCREENING MAMMOGRAM: Primary | ICD-10-CM

## 2024-03-04 DIAGNOSIS — C50.912 MALIGNANT NEOPLASM OF LEFT BREAST IN FEMALE, ESTROGEN RECEPTOR POSITIVE, UNSPECIFIED SITE OF BREAST (HCC): Primary | ICD-10-CM

## 2024-03-04 DIAGNOSIS — Z17.0 MALIGNANT NEOPLASM OF LEFT BREAST IN FEMALE, ESTROGEN RECEPTOR POSITIVE, UNSPECIFIED SITE OF BREAST (HCC): Primary | ICD-10-CM

## 2024-03-04 PROCEDURE — 99024 POSTOP FOLLOW-UP VISIT: CPT | Performed by: SURGERY

## 2024-03-04 NOTE — PROGRESS NOTES
Date of visit: 3/4/2024    01/18/24  03/04/24: Post op      DIAGNOSIS:  1. (01/18/24) LEFT (7:00) RECURRENT invasive ductal carcinoma  Clinical stage: [id number]  ER (100) [place] (80) HER2 (1+)  Pathologic stage: rT1b(9mm)NxMx (no node in SLNB tissue)  2. (2010) LEFT breast DCIS  3. Family history of cancer  * Paternal grandmother/breast- 60  * Paternal grandfather/breast- 80  * Paternal aunt/breast-60  4. (01/26/24) Genetic testing  * VUS DICER1    TREATMENT  1. (2010) LEFT lumpectomy  2. (2010) RT  3. Tamoxifen deferred due to medical issues  4. (02/14/24) LEFT lumpectomy and SLNB    IMAGING/PROCEDURES:  1. (11/20/23) RIGHT st-mammogram ([facility]): BIRADS-2  2. (11/20/23) LEFT dt-mammogram and US ([facility]): BIRADS-4  * LEFT (7:00) 1.1cm mass  * LEFT axilla normal  3. (12/07/23) LEFT US core biopsy  4. (01/25/24) MRI: BIRADS-6  * LEFT (6:00) 1.4cm enhancing mass  5. (01/25/24) MRI: BIRADS-6  * LEFT breast 1.4cm enhancing mass    Breast History  Darlin Cobb was in the office today for a postoperative visit.    Darlin recently underwent an uneventful left breast lumpectomy and attempted sentinel lymph node biopsy for a second cancer in the left breast.    Breast Symptoms  Darlin has no significant discomfort or disability.    Breast Examination  The incisions are healing without evidence of infection hematoma or seroma.    Pathology  I discussed the surgical pathology report with Darlin.  The resected tumor in the breast measured 9 mm with negative margins.  It is notable that the piece of tissue send as a sentinel node was fibrofatty tissue.    Assessment/Plan  Darlin Cobb has completed the surgical management of left breast cancer.  I informed the patient that technically her left axilla has not been staged.  The tissue that we presumed was a sentinel node was in fact simply fibrofatty tissue.  I shared with her that I do not believe she needs any more aggressive surgical management simply for the staging.  I did inform her that

## 2024-03-04 NOTE — TELEPHONE ENCOUNTER
Oncotype Dx recurrence score order placed and faxed to [facility]. Conformation received and scanned under Media tab into patient's chart.    Electronically signed by Mary Bardales RN on 3/4/24 at 11:12 AM EST

## 2024-03-07 ENCOUNTER — HOSPITAL ENCOUNTER (OUTPATIENT)
Dept: MRI IMAGING | Age: 69
Discharge: HOME | End: 2024-03-09
Payer: MEDICARE

## 2024-03-07 DIAGNOSIS — R29.818 TRANSIENT NEUROLOGICAL SYMPTOMS: ICD-10-CM

## 2024-03-07 DIAGNOSIS — Z85.3 HX OF BREAST CANCER: ICD-10-CM

## 2024-03-07 PROCEDURE — 70553 MRI BRAIN STEM W/O & W/DYE: CPT

## 2024-03-07 PROCEDURE — 6360000004 HC RX CONTRAST MEDICATION: Performed by: RADIOLOGY

## 2024-03-07 PROCEDURE — A9577 INJ MULTIHANCE: HCPCS | Performed by: RADIOLOGY

## 2024-03-07 RX ADMIN — GADOBENATE DIMEGLUMINE 14 ML: 529 INJECTION, SOLUTION INTRAVENOUS at 09:32

## 2024-03-07 NOTE — RESULT ENCOUNTER NOTE
Please let her know there was no abnormalities on her MRI to explain her symptoms. She incidentally has some inflammation in her R mastoid--which is one of the sinuses behind her middle ear--which is not related to her neuro issues. She can follow up with PCP for this

## 2024-03-11 ENCOUNTER — OFFICE VISIT (OUTPATIENT)
Dept: ONCOLOGY | Age: 69
End: 2024-03-11
Payer: MEDICARE

## 2024-03-11 ENCOUNTER — HOSPITAL ENCOUNTER (OUTPATIENT)
Dept: INFUSION THERAPY | Age: 69
Discharge: HOME | End: 2024-03-11

## 2024-03-11 VITALS
HEIGHT: 66 IN | TEMPERATURE: 97.6 F | OXYGEN SATURATION: 92 % | SYSTOLIC BLOOD PRESSURE: 113 MMHG | WEIGHT: 153.3 LBS | BODY MASS INDEX: 24.64 KG/M2 | HEART RATE: 56 BPM | DIASTOLIC BLOOD PRESSURE: 69 MMHG

## 2024-03-11 DIAGNOSIS — Z17.0 MALIGNANT NEOPLASM OF UPPER-OUTER QUADRANT OF RIGHT BREAST IN FEMALE, ESTROGEN RECEPTOR POSITIVE (HCC): Primary | ICD-10-CM

## 2024-03-11 DIAGNOSIS — C50.612 MALIGNANT NEOPLASM OF AXILLARY TAIL OF LEFT BREAST IN FEMALE, ESTROGEN RECEPTOR POSITIVE (HCC): Primary | ICD-10-CM

## 2024-03-11 DIAGNOSIS — C50.411 MALIGNANT NEOPLASM OF UPPER-OUTER QUADRANT OF RIGHT BREAST IN FEMALE, ESTROGEN RECEPTOR POSITIVE (HCC): Primary | ICD-10-CM

## 2024-03-11 DIAGNOSIS — Z17.0 MALIGNANT NEOPLASM OF AXILLARY TAIL OF LEFT BREAST IN FEMALE, ESTROGEN RECEPTOR POSITIVE (HCC): Primary | ICD-10-CM

## 2024-03-11 PROCEDURE — 99213 OFFICE O/P EST LOW 20 MIN: CPT

## 2024-03-11 NOTE — PROGRESS NOTES
applicable  pM Category: Not applicable - pM cannot be determined from the  submitted specimen(s)         Additional Findings: Prior biopsy site reactive changes.    Special Studies:       Breast Biomarker Testing Performed on Previous Biopsy  ([id number]):         Estrogen Receptor (ER): Positive       Progesterone Receptor (PgR): Positive       HER2 (by immunohistochemistry): Negative      After completion of adjuvant radiation therapy she will be recommended adjuvant AI along with antiplatelet therapy.  Oncotype DX 21 score to be reviewed.      Elijah Richard M.D., F.A.C.P.  Electronically signed 3/11/2024 at 2:35 PM

## 2024-03-12 ENCOUNTER — TELEPHONE (OUTPATIENT)
Dept: ONCOLOGY | Age: 69
End: 2024-03-12

## 2024-03-12 NOTE — TELEPHONE ENCOUNTER
SW followed up with pt re: financial aid documents.  Pt reported that she was able to go over to the hospital and get assistance with this.  Pt was encouraged to reach out to this provider should any other needs arise.      James CASE, BEN-S  Oncology Social Worker

## 2024-03-26 ENCOUNTER — TELEPHONE (OUTPATIENT)
Dept: BREAST CENTER | Age: 69
End: 2024-03-26

## 2024-03-26 NOTE — TELEPHONE ENCOUNTER
Call made to [facility] to check on the status of patient's Oncotype Dx results. Test was submitted on 3/4/24. Spoke with Ryan who was able to tell me that the prior authorization is still in process. They are waiting for patient's insurance to respond. She said they check with the insurance daily. They don't have a turn around time but does not think it should take much longer. Results will be available once they obtain authorization. Will update Dr. Alexander, radiation and medical oncology.

## 2024-03-26 NOTE — TELEPHONE ENCOUNTER
Patient contacted our office 3/26/24 @ 3:37pm -- patient stated that she received a denial letter from her insurance company for [employer].  Patient would like a call return call.

## 2024-03-27 ENCOUNTER — TELEPHONE (OUTPATIENT)
Dept: BREAST CENTER | Age: 69
End: 2024-03-27

## 2024-03-27 NOTE — TELEPHONE ENCOUNTER
Notified by patient that she received a letter from her insurance. The letter stated, \"Notice of denial of medical coverage\" due to lymph nodes not examined. Discussed with Dr. Alexander who asked me to reach out to the [employer] representative to check on authorization and coverage.     Spoke to [employer] representative, Chaitanya, in regards to patient's question today. He will touch base with [employer] service and get back with us soon on additional information.      Return call received from Melva ([employer]). She said they should hear tomorrow or Friday at the latest on the official word from the insurance. We can wait until then to hear the update or we have the option of having the results released but our provider would need to agree to assist in any appeals if needed. It is also possible that the patient could be responsible for the full cost of the test. [employer] would work with the patient to see if she is eligible for any financial assistance programs as well. I will discuss the above update with Dr. Alexander.

## 2024-03-27 NOTE — TELEPHONE ENCOUNTER
Update received from Dane with [employer]. Results are available. (Scanned to chart and being forwarded to health care team) Patient's prior authorization has been denied but not the claim. [employer] will submit the claim next which could take 1-3 weeks. If the claim is approved, the patient will not owe anything. If the claim is not approved, then [employer] will continue the appeal process which could take 1-3 months. Dane said that this process is all taken care of by [employer] moving forward and they will continue to appeal as much as needed. They will also offer the patient opportunities of financial assistance if applicable.     Left message with patient to update her on the above information as well.

## 2024-03-27 NOTE — TELEPHONE ENCOUNTER
[employer] Oncotype Dx Breast Recurrence Score Report: 5     Scanned to patient's chart and routed results to Dr. Alexander(breast surgeon),  (oncologist) and  (radiation oncologist). RN also sent to nurse navigator Allyson Calixto.       Electronically signed by Shagufta Grossman RN on 3/27/24 at 3:30 PM EDT    
chest pain

## 2024-03-28 LAB — SURGICAL PATHOLOGY REPORT: NORMAL

## 2024-04-05 ENCOUNTER — HOSPITAL ENCOUNTER (OUTPATIENT)
Dept: RADIATION ONCOLOGY | Age: 69
Discharge: HOME | End: 2024-04-05
Payer: MEDICARE

## 2024-04-05 PROCEDURE — 77332 RADIATION TREATMENT AID(S): CPT | Performed by: RADIOLOGY

## 2024-04-11 ENCOUNTER — HOSPITAL ENCOUNTER (OUTPATIENT)
Dept: RADIATION ONCOLOGY | Age: 69
Discharge: HOME | End: 2024-04-11
Payer: MEDICARE

## 2024-04-11 PROCEDURE — 77338 DESIGN MLC DEVICE FOR IMRT: CPT | Performed by: RADIOLOGY

## 2024-04-11 PROCEDURE — 77300 RADIATION THERAPY DOSE PLAN: CPT | Performed by: RADIOLOGY

## 2024-04-11 PROCEDURE — 77301 RADIOTHERAPY DOSE PLAN IMRT: CPT | Performed by: RADIOLOGY

## 2024-04-15 ENCOUNTER — HOSPITAL ENCOUNTER (OUTPATIENT)
Dept: INFUSION THERAPY | Age: 69
Discharge: HOME | End: 2024-04-15
Payer: MEDICARE

## 2024-04-15 ENCOUNTER — OFFICE VISIT (OUTPATIENT)
Dept: ONCOLOGY | Age: 69
End: 2024-04-15
Payer: MEDICARE

## 2024-04-15 VITALS
TEMPERATURE: 96.6 F | DIASTOLIC BLOOD PRESSURE: 63 MMHG | BODY MASS INDEX: 24.44 KG/M2 | SYSTOLIC BLOOD PRESSURE: 116 MMHG | HEIGHT: 66 IN | OXYGEN SATURATION: 100 % | HEART RATE: 51 BPM | WEIGHT: 152.1 LBS

## 2024-04-15 DIAGNOSIS — C50.612 MALIGNANT NEOPLASM OF AXILLARY TAIL OF LEFT BREAST IN FEMALE, ESTROGEN RECEPTOR POSITIVE (HCC): ICD-10-CM

## 2024-04-15 DIAGNOSIS — Z17.0 MALIGNANT NEOPLASM OF AXILLARY TAIL OF LEFT BREAST IN FEMALE, ESTROGEN RECEPTOR POSITIVE (HCC): Primary | ICD-10-CM

## 2024-04-15 DIAGNOSIS — C50.612 MALIGNANT NEOPLASM OF AXILLARY TAIL OF LEFT BREAST IN FEMALE, ESTROGEN RECEPTOR POSITIVE (HCC): Primary | ICD-10-CM

## 2024-04-15 DIAGNOSIS — Z17.0 MALIGNANT NEOPLASM OF AXILLARY TAIL OF LEFT BREAST IN FEMALE, ESTROGEN RECEPTOR POSITIVE (HCC): ICD-10-CM

## 2024-04-15 LAB
ALBUMIN SERPL-MCNC: 3.9 G/DL (ref 3.5–5.2)
ALP SERPL-CCNC: 73 U/L (ref 35–104)
ALT SERPL-CCNC: 24 U/L (ref 0–32)
ANION GAP SERPL CALCULATED.3IONS-SCNC: 13 MMOL/L (ref 7–16)
AST SERPL-CCNC: 21 U/L (ref 0–31)
BASOPHILS # BLD: 0.02 K/UL (ref 0–0.2)
BASOPHILS NFR BLD: 0 % (ref 0–2)
BILIRUB SERPL-MCNC: 0.2 MG/DL (ref 0–1.2)
BUN SERPL-MCNC: 9 MG/DL (ref 6–23)
CALCIUM SERPL-MCNC: 9.3 MG/DL (ref 8.6–10.2)
CHLORIDE SERPL-SCNC: 105 MMOL/L (ref 98–107)
CO2 SERPL-SCNC: 21 MMOL/L (ref 22–29)
CREAT SERPL-MCNC: 0.8 MG/DL (ref 0.5–1)
EOSINOPHIL # BLD: 0.14 K/UL (ref 0.05–0.5)
EOSINOPHILS RELATIVE PERCENT: 3 % (ref 0–6)
ERYTHROCYTE [DISTWIDTH] IN BLOOD BY AUTOMATED COUNT: 13.8 % (ref 11.5–15)
GFR SERPL CREATININE-BSD FRML MDRD: 87 ML/MIN/1.73M2
GLUCOSE SERPL-MCNC: 229 MG/DL (ref 74–99)
HCT VFR BLD AUTO: 34.6 % (ref 34–48)
HGB BLD-MCNC: 11.5 G/DL (ref 11.5–15.5)
IMM GRANULOCYTES # BLD AUTO: <0.03 K/UL (ref 0–0.58)
IMM GRANULOCYTES NFR BLD: 0 % (ref 0–5)
LYMPHOCYTES NFR BLD: 1.76 K/UL (ref 1.5–4)
LYMPHOCYTES RELATIVE PERCENT: 32 % (ref 20–42)
MCH RBC QN AUTO: 32.3 PG (ref 26–35)
MCHC RBC AUTO-ENTMCNC: 33.2 G/DL (ref 32–34.5)
MCV RBC AUTO: 97.2 FL (ref 80–99.9)
MONOCYTES NFR BLD: 0.49 K/UL (ref 0.1–0.95)
MONOCYTES NFR BLD: 9 % (ref 2–12)
NEUTROPHILS NFR BLD: 55 % (ref 43–80)
NEUTS SEG NFR BLD: 3 K/UL (ref 1.8–7.3)
PLATELET # BLD AUTO: 234 K/UL (ref 130–450)
PMV BLD AUTO: 9.2 FL (ref 7–12)
POTASSIUM SERPL-SCNC: 4.2 MMOL/L (ref 3.5–5)
PROT SERPL-MCNC: 7 G/DL (ref 6.4–8.3)
RBC # BLD AUTO: 3.56 M/UL (ref 3.5–5.5)
SODIUM SERPL-SCNC: 139 MMOL/L (ref 132–146)
WBC OTHER # BLD: 5.4 K/UL (ref 4.5–11.5)

## 2024-04-15 PROCEDURE — 36415 COLL VENOUS BLD VENIPUNCTURE: CPT

## 2024-04-15 PROCEDURE — 99212 OFFICE O/P EST SF 10 MIN: CPT

## 2024-04-15 PROCEDURE — 80053 COMPREHEN METABOLIC PANEL: CPT

## 2024-04-15 PROCEDURE — 85025 COMPLETE CBC W/AUTO DIFF WBC: CPT

## 2024-04-15 RX ORDER — HYDROXYZINE PAMOATE 25 MG/1
25 CAPSULE ORAL 4 TIMES DAILY PRN
COMMUNITY

## 2024-04-15 NOTE — PROGRESS NOTES
[facility]  [address] [place] [address].   [place] [address]        Pt Name: Darlin Cobb  YOB: 1955  Date of evaluation: 4/15/2024  Primary Care Physician: Des Edge DO  Reason for evaluation:   Chief Complaint   Patient presents with    Breast Cancer    Follow-up          Subjective:  Here for yearly follow-up.  Feels better than her hemoglobin is improved to the normal range.    Was C/O dizziness and giddiness,headaches .   MRI Brain was ordered by PCP.    It was negative except for mild sphenoid sinusitis.    C/O right shoulder pain. Received cortisone injection right shoulder by Dr. Green on 3/18/19. It helped her pain temporarily.   S/P right shoulder arthroscopy with subacromial arch decompression,  Labral debridement, Debridement of partial thickness rotator cuff tear, and  biceps tenotomy on 8/30/2019 by Dr. Jeffrey Green.        OBJECTIVE:  VITALS:  height is 1.676 m (5' 6\") and weight is 69 kg (152 lb 1.6 oz). Her temperature is 96.6 °F (35.9 °C) (abnormal). Her blood pressure is 116/63 and her pulse is 51. Her oxygen saturation is 100%.   Physical Exam:  Performance Status: 0  Well developed, well nourished female  EYES: sclera non-icteric   ENT: oropharynx clear.   NECK: No lymphadenopathy. Mild soft tissue swelling in the right neck without palpable lymphadenopathy.  BREASTS : Well-healed left lumpectomy and left axillary dissection scarS  HEART: Regular rate and rhythm.   LUNGS: Clear.  ABDOMEN: Soft, non-tender.  No mass or organomegaly.  EXTREMITIES: without edema.  NEUROLOGIC: No focal deficits.      Medications  Prior to Admission medications    Medication Sig Start Date End Date Taking? Authorizing Provider   oxyCODONE-acetaminophen (PERCOCET)  MG per tablet Take 1 tablet by mouth every 8 hours as needed. 12/14/23  Yes ProviderPromise MD   Accu-Chek Softclix Lancets MISC use as directed 2 to 3 times a day 11/13/23  Yes ProviderPromise MD   dicyclomine

## 2024-04-18 ENCOUNTER — HOSPITAL ENCOUNTER (OUTPATIENT)
Dept: RADIATION ONCOLOGY | Age: 69
Discharge: HOME | End: 2024-04-18
Payer: MEDICARE

## 2024-04-18 DIAGNOSIS — C80.1 CANCER (HCC): Primary | ICD-10-CM

## 2024-04-18 PROCEDURE — NBSRV NON-BILLABLE SERVICE: Performed by: RADIOLOGY

## 2024-04-22 ENCOUNTER — HOSPITAL ENCOUNTER (OUTPATIENT)
Dept: RADIATION ONCOLOGY | Age: 69
Discharge: HOME | End: 2024-04-22
Payer: MEDICARE

## 2024-04-22 PROCEDURE — 77014 CHG CT GUIDANCE RADIATION THERAPY FLDS PLACEMENT: CPT | Performed by: RADIOLOGY

## 2024-04-22 PROCEDURE — 77385 HC NTSTY MODUL RAD TX DLVR SMPL: CPT | Performed by: RADIOLOGY

## 2024-04-22 NOTE — PROGRESS NOTES
Radiation Oncology          -chart reviewed  -imaging reviewed  -cont RT        Arthur Neumann III, MD MS [facility]  [facility]  Radiation Oncology  Cell: [phone]    [facility]:  [phone]   FAX: [phone]  [facility]:  [phone]   FAX:    [phone]  [facility]:  [phone]   FAX:  [phone]

## 2024-04-22 NOTE — PATIENT INSTRUCTIONS
Continue daily fractionated radiation therapy as scheduled. Please see weekly OTV note and intial consultation letter in EPIC for clinical details.        Arthur Neumann III, MD MS DABR    [place]:  [phone]   FAX: [phone]  [facility]:  [phone]   FAX:    [phone]  [facility]:  [phone]   FAX:  [phone]  Email: [email or web address]

## 2024-04-23 ENCOUNTER — HOSPITAL ENCOUNTER (OUTPATIENT)
Dept: RADIATION ONCOLOGY | Age: 69
Discharge: HOME | End: 2024-04-23
Payer: MEDICARE

## 2024-04-23 PROCEDURE — 77014 CHG CT GUIDANCE RADIATION THERAPY FLDS PLACEMENT: CPT | Performed by: RADIOLOGY

## 2024-04-23 PROCEDURE — 77385 HC NTSTY MODUL RAD TX DLVR SMPL: CPT | Performed by: RADIOLOGY

## 2024-04-24 ENCOUNTER — HOSPITAL ENCOUNTER (OUTPATIENT)
Dept: RADIATION ONCOLOGY | Age: 69
Discharge: HOME | End: 2024-04-24
Payer: MEDICARE

## 2024-04-24 VITALS
DIASTOLIC BLOOD PRESSURE: 68 MMHG | OXYGEN SATURATION: 97 % | TEMPERATURE: 97.6 F | WEIGHT: 152.3 LBS | BODY MASS INDEX: 24.58 KG/M2 | SYSTOLIC BLOOD PRESSURE: 114 MMHG | HEART RATE: 74 BPM | RESPIRATION RATE: 18 BRPM

## 2024-04-24 DIAGNOSIS — C50.919 MALIGNANT NEOPLASM OF FEMALE BREAST, UNSPECIFIED ESTROGEN RECEPTOR STATUS, UNSPECIFIED LATERALITY, UNSPECIFIED SITE OF BREAST (HCC): Primary | ICD-10-CM

## 2024-04-24 PROCEDURE — 77014 CHG CT GUIDANCE RADIATION THERAPY FLDS PLACEMENT: CPT | Performed by: RADIOLOGY

## 2024-04-24 PROCEDURE — 77385 HC NTSTY MODUL RAD TX DLVR SMPL: CPT | Performed by: RADIOLOGY

## 2024-04-24 PROCEDURE — NBSRV NON-BILLABLE SERVICE: Performed by: RADIOLOGY

## 2024-04-24 NOTE — PROGRESS NOTES
Darlin GRAMAJO Reza  4/24/2024  Wt Readings from Last 3 Encounters:   04/24/24 69.1 kg (152 lb 4.8 oz)   04/15/24 69 kg (152 lb 1.6 oz)   03/11/24 69.5 kg (153 lb 4.8 oz)     Body mass index is 24.58 kg/m².        Treatment Area:APBI LEFT     Patient was seen today for weekly visit.     Comfort Alteration  KPS:90%  Fatigue: None    Nutritional Alteration  Anorexia: No   Nausea: No   Vomiting: No     Skin Alteration   Sensation:no    Radiation Dermatitis:  no    Mucous Membrane Alteration  Drainage: No  Lymphedema: No    Emotional  Coping: effective    Sexuality Alteration  na    Injury, potential bleeding or infection: no        Lab Results   Component Value Date    WBC 5.4 04/15/2024    HGB 11.5 04/15/2024    HCT 34.6 04/15/2024     04/15/2024         /68   Pulse 74   Temp 97.6 °F (36.4 °C) (Temporal)   Resp 18   Wt 69.1 kg (152 lb 4.8 oz)   SpO2 97%   BMI 24.58 kg/m²   BP within normal range? yes       Assessment/Plan: Pt completed 3/15fx and 801/4005cGy.    Delilah Yang RN

## 2024-04-25 ENCOUNTER — HOSPITAL ENCOUNTER (OUTPATIENT)
Dept: RADIATION ONCOLOGY | Age: 69
Discharge: HOME | End: 2024-04-25
Payer: MEDICARE

## 2024-04-25 PROCEDURE — 77014 CHG CT GUIDANCE RADIATION THERAPY FLDS PLACEMENT: CPT | Performed by: RADIOLOGY

## 2024-04-25 PROCEDURE — 77385 HC NTSTY MODUL RAD TX DLVR SMPL: CPT | Performed by: RADIOLOGY

## 2024-04-25 NOTE — PATIENT INSTRUCTIONS
Continue daily fractionated radiation therapy as scheduled. Please see weekly OTV note and intial consultation letter in EPIC for clinical details.        Arthur Neumann III, MD MS DABR    [facility]:  [phone]   FAX: [phone]  [facility]:  [phone]   FAX:    [phone]  [facility]:  [phone]   FAX:  [phone]  Email: [email or web address]

## 2024-04-25 NOTE — PROGRESS NOTES
DEPARTMENT OF RADIATION ONCOLOGY ON TREATMENT VISIT         4/25/2024      NAME:  Darlin Cobb    YOB: 1955    Diagnosis: breast cancer    SUBJECTIVE:   Darlin Cobb has now received fractionated external beam radiation therapy - ongoing.    Past medical, surgical, social and family histories reviewed and updated as indicated.    Pain: controlled    ALLERGIES:  Codeine, Macrobid [nitrofurantoin monohydrate macrocrystals], Pcn [penicillins], Neosporin [neomycin-polymyx-gramicid], Sulfa antibiotics, and Ultram [tramadol hcl]         Current Outpatient Medications   Medication Sig Dispense Refill    hydrOXYzine pamoate (VISTARIL) 25 MG capsule Take 1 capsule by mouth 4 times daily as needed for Anxiety      oxyCODONE-acetaminophen (PERCOCET)  MG per tablet Take 1 tablet by mouth every 8 hours as needed.      Accu-Chek Softclix Lancets MISC use as directed 2 to 3 times a day      dicyclomine (BENTYL) 20 MG tablet Take 1 tablet by mouth 2 times daily      B-D ULTRAFINE III SHORT PEN 31G X 8 MM MISC INJECT ONCE DAILY      metFORMIN (GLUCOPHAGE-XR) 500 MG extended release tablet       omeprazole (PRILOSEC) 40 MG delayed release capsule Take 1 capsule by mouth daily      buPROPion (WELLBUTRIN XL) 300 MG extended release tablet Take 1 tablet by mouth daily      albuterol sulfate HFA (PROVENTIL;VENTOLIN;PROAIR) 108 (90 Base) MCG/ACT inhaler INHALE TWO PUFFS BY MOUTH EVERY 4 TO 6 HOURS AS NEEDED      Diphenoxylate-Atropine (LOMOTIL PO) Take 1 Dose by mouth 2 times daily      TRESIBA FLEXTOUCH 100 UNIT/ML SOPN Inject 20 Units into the skin nightly 20-21 UNITS NIGHTLY      Ubrogepant (UBRELVY) 50 MG TABS Take by mouth      clopidogrel (PLAVIX) 75 MG tablet Take 1 tablet by mouth daily 90 tablet 3    methocarbamol (ROBAXIN) 750 MG tablet       meclizine (ANTIVERT) 25 MG tablet Take 1 tablet by mouth every 8 hours as needed      famotidine (PEPCID) 40 MG tablet TAKE ONE TABLET BY MOUTH EVERY DAY  1

## 2024-04-26 ENCOUNTER — HOSPITAL ENCOUNTER (OUTPATIENT)
Dept: RADIATION ONCOLOGY | Age: 69
Discharge: HOME | End: 2024-04-26
Payer: MEDICARE

## 2024-04-26 PROCEDURE — 77385 HC NTSTY MODUL RAD TX DLVR SMPL: CPT | Performed by: RADIOLOGY

## 2024-04-26 PROCEDURE — 77336 RADIATION PHYSICS CONSULT: CPT | Performed by: RADIOLOGY

## 2024-04-29 ENCOUNTER — APPOINTMENT (OUTPATIENT)
Dept: RADIATION ONCOLOGY | Age: 69
End: 2024-04-29
Payer: MEDICARE

## 2024-04-29 PROCEDURE — 77385 HC NTSTY MODUL RAD TX DLVR SMPL: CPT | Performed by: RADIOLOGY

## 2024-04-29 PROCEDURE — 77014 CHG CT GUIDANCE RADIATION THERAPY FLDS PLACEMENT: CPT | Performed by: RADIOLOGY

## 2024-04-30 ENCOUNTER — APPOINTMENT (OUTPATIENT)
Dept: RADIATION ONCOLOGY | Age: 69
End: 2024-04-30
Payer: MEDICARE

## 2024-04-30 PROCEDURE — 77014 CHG CT GUIDANCE RADIATION THERAPY FLDS PLACEMENT: CPT | Performed by: RADIOLOGY

## 2024-04-30 PROCEDURE — 77385 HC NTSTY MODUL RAD TX DLVR SMPL: CPT | Performed by: RADIOLOGY

## 2024-05-01 ENCOUNTER — HOSPITAL ENCOUNTER (OUTPATIENT)
Dept: RADIATION ONCOLOGY | Age: 69
Discharge: HOME | End: 2024-05-01
Payer: MEDICARE

## 2024-05-01 DIAGNOSIS — C50.919 MALIGNANT NEOPLASM OF FEMALE BREAST, UNSPECIFIED ESTROGEN RECEPTOR STATUS, UNSPECIFIED LATERALITY, UNSPECIFIED SITE OF BREAST (HCC): Primary | ICD-10-CM

## 2024-05-01 PROCEDURE — NBSRV NON-BILLABLE SERVICE: Performed by: RADIOLOGY

## 2024-05-01 PROCEDURE — 77014 CHG CT GUIDANCE RADIATION THERAPY FLDS PLACEMENT: CPT | Performed by: RADIOLOGY

## 2024-05-01 PROCEDURE — 77385 HC NTSTY MODUL RAD TX DLVR SMPL: CPT | Performed by: RADIOLOGY

## 2024-05-02 ENCOUNTER — HOSPITAL ENCOUNTER (OUTPATIENT)
Dept: RADIATION ONCOLOGY | Age: 69
Discharge: HOME | End: 2024-05-02
Payer: MEDICARE

## 2024-05-02 PROCEDURE — 77014 CHG CT GUIDANCE RADIATION THERAPY FLDS PLACEMENT: CPT | Performed by: RADIOLOGY

## 2024-05-02 PROCEDURE — 77385 HC NTSTY MODUL RAD TX DLVR SMPL: CPT | Performed by: RADIOLOGY

## 2024-05-03 ENCOUNTER — HOSPITAL ENCOUNTER (OUTPATIENT)
Dept: RADIATION ONCOLOGY | Age: 69
Discharge: HOME | End: 2024-05-03
Payer: MEDICARE

## 2024-05-03 PROCEDURE — 77336 RADIATION PHYSICS CONSULT: CPT | Performed by: RADIOLOGY

## 2024-05-03 PROCEDURE — 77385 HC NTSTY MODUL RAD TX DLVR SMPL: CPT | Performed by: RADIOLOGY

## 2024-05-06 ENCOUNTER — HOSPITAL ENCOUNTER (OUTPATIENT)
Dept: RADIATION ONCOLOGY | Age: 69
Discharge: HOME | End: 2024-05-06
Payer: MEDICARE

## 2024-05-06 PROCEDURE — 77385 HC NTSTY MODUL RAD TX DLVR SMPL: CPT | Performed by: RADIOLOGY

## 2024-05-06 PROCEDURE — 77014 CHG CT GUIDANCE RADIATION THERAPY FLDS PLACEMENT: CPT | Performed by: RADIOLOGY

## 2024-05-07 ENCOUNTER — HOSPITAL ENCOUNTER (OUTPATIENT)
Dept: RADIATION ONCOLOGY | Age: 69
Discharge: HOME | End: 2024-05-07
Payer: MEDICARE

## 2024-05-07 PROCEDURE — 77385 HC NTSTY MODUL RAD TX DLVR SMPL: CPT | Performed by: RADIOLOGY

## 2024-05-07 PROCEDURE — 77014 CHG CT GUIDANCE RADIATION THERAPY FLDS PLACEMENT: CPT | Performed by: RADIOLOGY

## 2024-05-08 ENCOUNTER — HOSPITAL ENCOUNTER (OUTPATIENT)
Dept: RADIATION ONCOLOGY | Age: 69
Discharge: HOME | End: 2024-05-08
Payer: MEDICARE

## 2024-05-08 VITALS
WEIGHT: 152.3 LBS | HEART RATE: 54 BPM | OXYGEN SATURATION: 97 % | DIASTOLIC BLOOD PRESSURE: 64 MMHG | RESPIRATION RATE: 18 BRPM | BODY MASS INDEX: 24.58 KG/M2 | SYSTOLIC BLOOD PRESSURE: 139 MMHG | TEMPERATURE: 97.3 F

## 2024-05-08 DIAGNOSIS — C50.919 MALIGNANT NEOPLASM OF FEMALE BREAST, UNSPECIFIED ESTROGEN RECEPTOR STATUS, UNSPECIFIED LATERALITY, UNSPECIFIED SITE OF BREAST (HCC): Primary | ICD-10-CM

## 2024-05-08 PROCEDURE — 77385 HC NTSTY MODUL RAD TX DLVR SMPL: CPT | Performed by: RADIOLOGY

## 2024-05-08 PROCEDURE — 77014 CHG CT GUIDANCE RADIATION THERAPY FLDS PLACEMENT: CPT | Performed by: RADIOLOGY

## 2024-05-08 PROCEDURE — NBSRV NON-BILLABLE SERVICE: Performed by: RADIOLOGY

## 2024-05-08 NOTE — PATIENT INSTRUCTIONS
Continue daily fractionated radiation therapy as scheduled. Please see weekly OTV note and intial consultation letter in EPIC for clinical details.        Arthur Neumann III, MD MS DABR    [facility]:  [phone]   FAX: [phone]  [facility]:  [phone]   FAX:    [phone]  [facility]:  [phone]   FAX:  [phone]  Email: [email or web address]

## 2024-05-08 NOTE — PROGRESS NOTES
DEPARTMENT OF RADIATION ONCOLOGY ON TREATMENT VISIT         5/8/2024      NAME:  Darlin Cobb    YOB: 1955    Diagnosis: breast cancer    SUBJECTIVE:   Darlin Cobb has now received fractionated external beam radiation therapy - ongoing.    Past medical, surgical, social and family histories reviewed and updated as indicated.    Pain: controlled    ALLERGIES:  Codeine, Macrobid [nitrofurantoin monohydrate macrocrystals], Pcn [penicillins], Neosporin [neomycin-polymyx-gramicid], Sulfa antibiotics, and Ultram [tramadol hcl]         Current Outpatient Medications   Medication Sig Dispense Refill    hydrOXYzine pamoate (VISTARIL) 25 MG capsule Take 1 capsule by mouth 4 times daily as needed for Anxiety      oxyCODONE-acetaminophen (PERCOCET)  MG per tablet Take 1 tablet by mouth every 8 hours as needed.      Accu-Chek Softclix Lancets MISC use as directed 2 to 3 times a day      dicyclomine (BENTYL) 20 MG tablet Take 1 tablet by mouth 2 times daily      B-D ULTRAFINE III SHORT PEN 31G X 8 MM MISC INJECT ONCE DAILY      metFORMIN (GLUCOPHAGE-XR) 500 MG extended release tablet       omeprazole (PRILOSEC) 40 MG delayed release capsule Take 1 capsule by mouth daily      buPROPion (WELLBUTRIN XL) 300 MG extended release tablet Take 1 tablet by mouth daily      albuterol sulfate HFA (PROVENTIL;VENTOLIN;PROAIR) 108 (90 Base) MCG/ACT inhaler INHALE TWO PUFFS BY MOUTH EVERY 4 TO 6 HOURS AS NEEDED      Diphenoxylate-Atropine (LOMOTIL PO) Take 1 Dose by mouth 2 times daily      TRESIBA FLEXTOUCH 100 UNIT/ML SOPN Inject 20 Units into the skin nightly 20-21 UNITS NIGHTLY      Ubrogepant (UBRELVY) 50 MG TABS Take by mouth      clopidogrel (PLAVIX) 75 MG tablet Take 1 tablet by mouth daily 90 tablet 3    methocarbamol (ROBAXIN) 750 MG tablet       meclizine (ANTIVERT) 25 MG tablet Take 1 tablet by mouth every 8 hours as needed      famotidine (PEPCID) 40 MG tablet TAKE ONE TABLET BY MOUTH EVERY DAY  1

## 2024-05-08 NOTE — PATIENT INSTRUCTIONS
Continue daily fractionated radiation therapy as scheduled. Please see weekly OTV note and intial consultation letter in EPIC for clinical details.        Artuhr Neumann III, MD MS DABR    [place]:  [phone]   FAX: [phone]  [facility]:  [phone]   FAX:    [phone]  [facility]:  [phone]   FAX:  [phone]  Email: [email or web address]

## 2024-05-08 NOTE — PROGRESS NOTES
Oncologist        JUDITH ([facility]): [phone] /// FAX: [phone]  BRITT ([place]): [phone] /// FAX: [phone]  SJ ([place]): [phone] /// FAX: [phone]

## 2024-05-08 NOTE — PROGRESS NOTES
Darlin GRAMAJO Reza  5/8/2024  Wt Readings from Last 3 Encounters:   05/08/24 69.1 kg (152 lb 4.8 oz)   04/24/24 69.1 kg (152 lb 4.8 oz)   04/15/24 69 kg (152 lb 1.6 oz)     Body mass index is 24.58 kg/m².        Treatment Area:CTV APBI left    Patient was seen today for weekly visit.     Comfort Alteration  KPS:90%  Fatigue: Severe    Nutritional Alteration  Anorexia: No   Nausea: No   Vomiting: No     Skin Alteration   Sensation:good    Radiation Dermatitis:  na    Mucous Membrane Alteration  Drainage: No  Lymphedema: No    Emotional  Coping: effective    Sexuality Alteration  na    Injury, potential bleeding or infection: na        Lab Results   Component Value Date    WBC 5.4 04/15/2024    HGB 11.5 04/15/2024    HCT 34.6 04/15/2024     04/15/2024         /64   Pulse 54   Temp 97.3 °F (36.3 °C) (Skin)   Resp 18   Wt 69.1 kg (152 lb 4.8 oz)   SpO2 97%   BMI 24.58 kg/m²   BP within normal range? yes           Assessment/Plan:13/90gb432/4005cgy and tolerating well.    Liudmila Cain RN

## 2024-05-09 ENCOUNTER — HOSPITAL ENCOUNTER (OUTPATIENT)
Dept: RADIATION ONCOLOGY | Age: 69
Discharge: HOME | End: 2024-05-09
Payer: MEDICARE

## 2024-05-09 PROCEDURE — 77385 HC NTSTY MODUL RAD TX DLVR SMPL: CPT | Performed by: RADIOLOGY

## 2024-05-09 PROCEDURE — 77014 CHG CT GUIDANCE RADIATION THERAPY FLDS PLACEMENT: CPT | Performed by: RADIOLOGY

## 2024-05-10 ENCOUNTER — HOSPITAL ENCOUNTER (OUTPATIENT)
Dept: RADIATION ONCOLOGY | Age: 69
Discharge: HOME | End: 2024-05-10
Payer: MEDICARE

## 2024-05-10 PROCEDURE — 77385 HC NTSTY MODUL RAD TX DLVR SMPL: CPT | Performed by: RADIOLOGY

## 2024-05-10 PROCEDURE — 77336 RADIATION PHYSICS CONSULT: CPT | Performed by: RADIOLOGY

## 2024-05-10 NOTE — PROGRESS NOTES
Discharge instructions given and verbalized understanding of care. She is going to schedule her own follow up appointment at [facility]. All questions were answered and she expressed understanding if care.  
Universal Safety Interventions

## 2024-05-22 ENCOUNTER — HOSPITAL ENCOUNTER (OUTPATIENT)
Dept: INFUSION THERAPY | Age: 69
Discharge: HOME | End: 2024-05-22
Payer: MEDICARE

## 2024-05-22 ENCOUNTER — OFFICE VISIT (OUTPATIENT)
Dept: ONCOLOGY | Age: 69
End: 2024-05-22
Payer: MEDICARE

## 2024-05-22 VITALS
BODY MASS INDEX: 24.63 KG/M2 | RESPIRATION RATE: 16 BRPM | WEIGHT: 152.6 LBS | OXYGEN SATURATION: 93 % | TEMPERATURE: 97.4 F | DIASTOLIC BLOOD PRESSURE: 70 MMHG | SYSTOLIC BLOOD PRESSURE: 108 MMHG | HEART RATE: 56 BPM

## 2024-05-22 DIAGNOSIS — Z17.0 MALIGNANT NEOPLASM OF AXILLARY TAIL OF LEFT BREAST IN FEMALE, ESTROGEN RECEPTOR POSITIVE (HCC): Primary | ICD-10-CM

## 2024-05-22 DIAGNOSIS — C50.612 MALIGNANT NEOPLASM OF AXILLARY TAIL OF LEFT BREAST IN FEMALE, ESTROGEN RECEPTOR POSITIVE (HCC): Primary | ICD-10-CM

## 2024-05-22 DIAGNOSIS — C50.612 MALIGNANT NEOPLASM OF AXILLARY TAIL OF LEFT BREAST IN FEMALE, ESTROGEN RECEPTOR POSITIVE (HCC): ICD-10-CM

## 2024-05-22 DIAGNOSIS — Z17.0 MALIGNANT NEOPLASM OF AXILLARY TAIL OF LEFT BREAST IN FEMALE, ESTROGEN RECEPTOR POSITIVE (HCC): ICD-10-CM

## 2024-05-22 LAB
ALBUMIN SERPL-MCNC: 4 G/DL (ref 3.5–5.2)
ALP SERPL-CCNC: 66 U/L (ref 35–104)
ALT SERPL-CCNC: 18 U/L (ref 0–32)
ANION GAP SERPL CALCULATED.3IONS-SCNC: 11 MMOL/L (ref 7–16)
AST SERPL-CCNC: 28 U/L (ref 0–31)
BASOPHILS # BLD: 0.03 K/UL (ref 0–0.2)
BASOPHILS NFR BLD: 1 % (ref 0–2)
BILIRUB SERPL-MCNC: 0.2 MG/DL (ref 0–1.2)
BUN SERPL-MCNC: 14 MG/DL (ref 6–23)
CALCIUM SERPL-MCNC: 9.3 MG/DL (ref 8.6–10.2)
CHLORIDE SERPL-SCNC: 101 MMOL/L (ref 98–107)
CO2 SERPL-SCNC: 25 MMOL/L (ref 22–29)
CREAT SERPL-MCNC: 0.8 MG/DL (ref 0.5–1)
EOSINOPHIL # BLD: 0.2 K/UL (ref 0.05–0.5)
EOSINOPHILS RELATIVE PERCENT: 4 % (ref 0–6)
ERYTHROCYTE [DISTWIDTH] IN BLOOD BY AUTOMATED COUNT: 13.3 % (ref 11.5–15)
GFR, ESTIMATED: 81 ML/MIN/1.73M2
GLUCOSE SERPL-MCNC: 194 MG/DL (ref 74–99)
HCT VFR BLD AUTO: 38.3 % (ref 34–48)
HGB BLD-MCNC: 12.8 G/DL (ref 11.5–15.5)
IMM GRANULOCYTES # BLD AUTO: <0.03 K/UL (ref 0–0.58)
IMM GRANULOCYTES NFR BLD: 0 % (ref 0–5)
LYMPHOCYTES NFR BLD: 1.52 K/UL (ref 1.5–4)
LYMPHOCYTES RELATIVE PERCENT: 27 % (ref 20–42)
MCH RBC QN AUTO: 33 PG (ref 26–35)
MCHC RBC AUTO-ENTMCNC: 33.4 G/DL (ref 32–34.5)
MCV RBC AUTO: 98.7 FL (ref 80–99.9)
MONOCYTES NFR BLD: 0.72 K/UL (ref 0.1–0.95)
MONOCYTES NFR BLD: 13 % (ref 2–12)
NEUTROPHILS NFR BLD: 56 % (ref 43–80)
NEUTS SEG NFR BLD: 3.17 K/UL (ref 1.8–7.3)
PLATELET # BLD AUTO: 223 K/UL (ref 130–450)
PMV BLD AUTO: 9.6 FL (ref 7–12)
POTASSIUM SERPL-SCNC: 4.5 MMOL/L (ref 3.5–5)
PROT SERPL-MCNC: 7.6 G/DL (ref 6.4–8.3)
RBC # BLD AUTO: 3.88 M/UL (ref 3.5–5.5)
SODIUM SERPL-SCNC: 137 MMOL/L (ref 132–146)
WBC OTHER # BLD: 5.7 K/UL (ref 4.5–11.5)

## 2024-05-22 PROCEDURE — 36415 COLL VENOUS BLD VENIPUNCTURE: CPT

## 2024-05-22 PROCEDURE — 85025 COMPLETE CBC W/AUTO DIFF WBC: CPT

## 2024-05-22 PROCEDURE — 80053 COMPREHEN METABOLIC PANEL: CPT

## 2024-05-22 PROCEDURE — 99212 OFFICE O/P EST SF 10 MIN: CPT

## 2024-05-22 RX ORDER — ANASTROZOLE 1 MG/1
1 TABLET ORAL DAILY
Qty: 90 TABLET | Refills: 0 | Status: SHIPPED | OUTPATIENT
Start: 2024-05-22 | End: 2024-08-20

## 2024-05-22 NOTE — PROGRESS NOTES
[facility]  [address] [place] [address].   [place] [address]        Pt Name: Darlin Cobb  YOB: 1955  Date of evaluation: 5/22/2024  Primary Care Physician: Des Edge DO  Reason for evaluation:   No chief complaint on file.         Subjective:  Here for yearly follow-up.  Feels better than her hemoglobin is improved to the normal range.    Was C/O dizziness and giddiness,headaches .   MRI Brain was ordered by PCP.    It was negative except for mild sphenoid sinusitis.    C/O right shoulder pain. Received cortisone injection right shoulder by Dr. Green on 3/18/19. It helped her pain temporarily.   S/P right shoulder arthroscopy with subacromial arch decompression,  Labral debridement, Debridement of partial thickness rotator cuff tear, and  biceps tenotomy on 8/30/2019 by Dr. Jeffrey Green.        OBJECTIVE:  VITALS:  weight is 69.2 kg (152 lb 9.6 oz). Her temperature is 97.4 °F (36.3 °C). Her blood pressure is 108/70 and her pulse is 56. Her respiration is 16 and oxygen saturation is 93%.   Physical Exam:  Performance Status: 0  Well developed, well nourished female  EYES: sclera non-icteric   ENT: oropharynx clear.   NECK: No lymphadenopathy. Mild soft tissue swelling in the right neck without palpable lymphadenopathy.  BREASTS : Well-healed left lumpectomy and left axillary dissection scarS  HEART: Regular rate and rhythm.   LUNGS: Clear.  ABDOMEN: Soft, non-tender.  No mass or organomegaly.  EXTREMITIES: without edema.  NEUROLOGIC: No focal deficits.      Medications  Prior to Admission medications    Medication Sig Start Date End Date Taking? Authorizing Provider   hydrOXYzine pamoate (VISTARIL) 25 MG capsule Take 1 capsule by mouth 4 times daily as needed for Anxiety   Yes Provider, MD Promise   oxyCODONE-acetaminophen (PERCOCET)  MG per tablet Take 1 tablet by mouth every 8 hours as needed. 12/14/23  Yes ProviderPromise MD   Accu-Chek Softclix Lancets MISC use as

## 2024-06-25 ENCOUNTER — HOSPITAL ENCOUNTER (OUTPATIENT)
Dept: RADIATION ONCOLOGY | Age: 69
Discharge: HOME | End: 2024-06-25

## 2024-06-25 VITALS
TEMPERATURE: 97.3 F | WEIGHT: 152 LBS | OXYGEN SATURATION: 96 % | HEART RATE: 69 BPM | RESPIRATION RATE: 18 BRPM | SYSTOLIC BLOOD PRESSURE: 104 MMHG | DIASTOLIC BLOOD PRESSURE: 63 MMHG | BODY MASS INDEX: 24.53 KG/M2

## 2024-06-25 DIAGNOSIS — Z17.0 MALIGNANT NEOPLASM OF LOWER-INNER QUADRANT OF LEFT BREAST IN FEMALE, ESTROGEN RECEPTOR POSITIVE (HCC): Primary | ICD-10-CM

## 2024-06-25 DIAGNOSIS — C50.312 MALIGNANT NEOPLASM OF LOWER-INNER QUADRANT OF LEFT BREAST IN FEMALE, ESTROGEN RECEPTOR POSITIVE (HCC): Primary | ICD-10-CM

## 2024-06-25 PROCEDURE — 99024 POSTOP FOLLOW-UP VISIT: CPT | Performed by: NURSE PRACTITIONER

## 2024-06-25 RX ORDER — DAPAGLIFLOZIN 10 MG/1
10 TABLET, FILM COATED ORAL DAILY
COMMUNITY
Start: 2024-05-22

## 2024-06-25 NOTE — PROGRESS NOTES
RADIATION ONCOLOGY- [facility]  6 week follow up       06/25/2024       NAME:  Darlin Cobb    YOB: 1955    Diagnosis: Left breast cancer, recurrent.     Subjective:  On 05/10/2024, Darlin Cobb completed APBI to the left breast, total dose 4005 cGy in 15.     The patient is seen in 6 week post radiation therapy completion symptom check visit. The patient denies skin complaints of pain complaints to treatment site. The patient denies swelling to the left breast or left arm. The patient stated on Arimidex per Medical Oncology, she denies any intolerable side effects of the medication. No fevers or chills. No nausea or vomiting. The patient reports chronic migraine headaches and chronic cervical neck pain under management of Neurology.    Patient is following with:    Medical Oncology- Dr. Richard. Most recent visit 05/22/2024. Next visit 08/21/2024.     Breast Surgery-  Dr. Alexander. Next visit 12/12/2024.     Neurology- MONIKA Whalen-CNP. Next visit 07/17/2024.     Primary Care- Dr. Edge.     Pain: No pain complaints.     Past medical, surgical, social and family histories reviewed and updated as indicated.    ALLERGIES:  Codeine, Macrobid [nitrofurantoin monohydrate macrocrystals], Pcn [penicillins], Neosporin [neomycin-polymyx-gramicid], Sulfa antibiotics, and Ultram [tramadol hcl]       Current Outpatient Medications   Medication Sig Dispense Refill    FARXIGA 10 MG tablet Take 1 tablet by mouth daily      anastrozole (ARIMIDEX) 1 MG tablet Take 1 tablet by mouth daily 90 tablet 0    hydrOXYzine pamoate (VISTARIL) 25 MG capsule Take 1 capsule by mouth 4 times daily as needed for Anxiety      oxyCODONE-acetaminophen (PERCOCET)  MG per tablet Take 1 tablet by mouth every 8 hours as needed.      Accu-Chek Softclix Lancets MISC use as directed 2 to 3 times a day      dicyclomine (BENTYL) 20 MG tablet Take 1 tablet by mouth 2 times daily      B-D ULTRAFINE III SHORT PEN 31G X 8 MM MISC 
Educate patient/family/caregiver on falls prevention  6.  Falls risk precaution (Yellow sticker Level II) placed on patient chart   7 or   Higher High Risk 1.  Place patient in easily observable treatment room  2.  Patient attended at all times by family member or staff  3.  Provide assistance as indicated for ambulation activities  4.  Reorient confused/cognitively impaired patient  5.  Call-light/bell within patient's reach  6.  Chair/bed in low position, stretcher/bed with siderails up except when performing patient care activities  7.  Educate patient/family/caregiver on falls prevention  8.  Falls risk precaution (Yellow sticker Level III) placed on patient chart           MALNUTRITION RISK SCREENING ASSESSMENT    6/25/2024   Patient:  Darlin Cobb  Sex:  female    Instructions:  Assess the patient and enter the appropriate indicators that are present for nutrition risk identification. Total the numbers entered and assign a risk score. Follow the appropriate action for total score listed below.     Assessment   Date  6/25/2024     Have you lost weight without trying?      0- No     Have you been eating poorly because of a decreased appetite?         0- No   3. Do you have a diagnosis of head and neck cancer OR will you be receiving neoadjuvant treatment for breast cancer?      0- No                                                                                    TOTAL 0          Score of 0-1: No action  Score 2 or greater:  For Non-Diabetic Patient: Recommend adding Ensure Complete 2 x daily and provide patient with Ensure wellness bag with coupons  For Diabetic Patient: Recommend adding Glucerna Shake 2 x daily and provide patient with Glucerna Wellness bag with coupons  Route to the dietitian via [facility]            Ryann Haney RN

## 2024-07-18 PROBLEM — G45.9 TIA (TRANSIENT ISCHEMIC ATTACK): Status: RESOLVED | Noted: 2022-11-02 | Resolved: 2024-07-18

## 2024-07-18 PROBLEM — R29.818 TRANSIENT NEUROLOGICAL SYMPTOMS: Status: ACTIVE | Noted: 2023-11-07

## 2024-08-09 RX ORDER — ANASTROZOLE 1 MG/1
1 TABLET ORAL DAILY
Qty: 90 TABLET | Refills: 0 | Status: SHIPPED | OUTPATIENT
Start: 2024-08-09 | End: 2024-11-07

## 2024-08-14 ENCOUNTER — TELEPHONE (OUTPATIENT)
Dept: CASE MANAGEMENT | Age: 69
End: 2024-08-14

## 2024-08-14 NOTE — TELEPHONE ENCOUNTER
Prepared patient's Survivorship Care Plan and Treatment Summary for her follow up appointment with Dr. Richard at [facility] on 8/21/2024.  Copy sent to patient's PCP.  Folder prepared with additional written literature.  STEFAN Nowak, BSW, RN, OCN  Oncology Nurse Navigator

## 2024-08-22 ENCOUNTER — OFFICE VISIT (OUTPATIENT)
Dept: CARDIOLOGY CLINIC | Age: 69
End: 2024-08-22

## 2024-08-22 VITALS
WEIGHT: 155 LBS | HEIGHT: 66 IN | BODY MASS INDEX: 24.91 KG/M2 | SYSTOLIC BLOOD PRESSURE: 114 MMHG | DIASTOLIC BLOOD PRESSURE: 50 MMHG | HEART RATE: 53 BPM | RESPIRATION RATE: 16 BRPM

## 2024-08-22 DIAGNOSIS — M19.011 ARTHROPATHY OF RIGHT SHOULDER: ICD-10-CM

## 2024-08-22 DIAGNOSIS — M79.7 FIBROMYALGIA: ICD-10-CM

## 2024-08-22 DIAGNOSIS — Z87.19 H/O DIVERTICULITIS OF COLON: ICD-10-CM

## 2024-08-22 DIAGNOSIS — Z86.79 H/O PERICARDITIS: ICD-10-CM

## 2024-08-22 DIAGNOSIS — Z85.3 HISTORY OF LEFT BREAST CANCER: ICD-10-CM

## 2024-08-22 DIAGNOSIS — E11.8 DM (DIABETES MELLITUS), TYPE 2 WITH COMPLICATIONS (HCC): ICD-10-CM

## 2024-08-22 DIAGNOSIS — Z90.49 HX OF CHOLECYSTECTOMY: ICD-10-CM

## 2024-08-22 DIAGNOSIS — K58.9 IRRITABLE BOWEL SYNDROME, UNSPECIFIED TYPE: ICD-10-CM

## 2024-08-22 DIAGNOSIS — E78.2 MIXED HYPERLIPIDEMIA: ICD-10-CM

## 2024-08-22 DIAGNOSIS — K21.9 HIATAL HERNIA WITH GASTROESOPHAGEAL REFLUX: ICD-10-CM

## 2024-08-22 DIAGNOSIS — F20.9 SCHIZOPHRENIA, UNSPECIFIED TYPE (HCC): ICD-10-CM

## 2024-08-22 DIAGNOSIS — Z86.73 HISTORY OF TIA (TRANSIENT ISCHEMIC ATTACK): ICD-10-CM

## 2024-08-22 DIAGNOSIS — Z86.69 HISTORY OF MIGRAINE HEADACHES: ICD-10-CM

## 2024-08-22 DIAGNOSIS — M47.9 OSTEOARTHRITIS OF SPINE, UNSPECIFIED SPINAL OSTEOARTHRITIS COMPLICATION STATUS, UNSPECIFIED SPINAL REGION: ICD-10-CM

## 2024-08-22 DIAGNOSIS — J45.20 MILD INTERMITTENT ASTHMA WITHOUT COMPLICATION: ICD-10-CM

## 2024-08-22 DIAGNOSIS — I34.0 NONRHEUMATIC MITRAL VALVE REGURGITATION: Primary | ICD-10-CM

## 2024-08-22 DIAGNOSIS — G24.01 TARDIVE DYSKINESIA: ICD-10-CM

## 2024-08-22 DIAGNOSIS — Z86.2 HISTORY OF ANEMIA: ICD-10-CM

## 2024-08-22 DIAGNOSIS — F31.9 BIPOLAR DEPRESSION (HCC): ICD-10-CM

## 2024-08-22 DIAGNOSIS — M85.80 OSTEOPENIA, UNSPECIFIED LOCATION: ICD-10-CM

## 2024-08-22 DIAGNOSIS — K76.0 FATTY LIVER: ICD-10-CM

## 2024-08-22 DIAGNOSIS — K44.9 HIATAL HERNIA WITH GASTROESOPHAGEAL REFLUX: ICD-10-CM

## 2024-08-22 NOTE — PROGRESS NOTES
OFFICE VISIT        PRIMARY CARE PHYSICIAN:    Des Edge DO         ALLERGIES / SENSITIVITIES:    Allergies   Allergen Reactions    Codeine Swelling     Pure codeine gets itching    Macrobid [Nitrofurantoin Monohydrate Macrocrystals] Rash    Pcn [Penicillins] Hives     On stomach and thighs      Neosporin [Neomycin-Polymyx-Gramicid] Dermatitis    Sulfa Antibiotics Dermatitis     Skin turns red      Ultram [Tramadol Hcl] Itching     & hallucinations            REVIEWED MEDICATIONS:      Current Outpatient Medications:     anastrozole (ARIMIDEX) 1 MG tablet, Take 1 tablet by mouth daily, Disp: 90 tablet, Rfl: 0    FARXIGA 10 MG tablet, Take 1 tablet by mouth daily, Disp: , Rfl:     hydrOXYzine pamoate (VISTARIL) 25 MG capsule, Take 1 capsule by mouth 4 times daily as needed for Anxiety, Disp: , Rfl:     oxyCODONE-acetaminophen (PERCOCET)  MG per tablet, Take 1 tablet by mouth every 8 hours as needed., Disp: , Rfl:     Accu-Chek Softclix Lancets MISC, use as directed 2 to 3 times a day, Disp: , Rfl:     dicyclomine (BENTYL) 20 MG tablet, Take 1 tablet by mouth 2 times daily, Disp: , Rfl:     B-D ULTRAFINE III SHORT PEN 31G X 8 MM MISC, INJECT ONCE DAILY, Disp: , Rfl:     omeprazole (PRILOSEC) 40 MG delayed release capsule, Take 1 capsule by mouth daily, Disp: , Rfl:     buPROPion (WELLBUTRIN XL) 300 MG extended release tablet, Take 1 tablet by mouth daily, Disp: , Rfl:     albuterol sulfate HFA (PROVENTIL;VENTOLIN;PROAIR) 108 (90 Base) MCG/ACT inhaler, INHALE TWO PUFFS BY MOUTH EVERY 4 TO 6 HOURS AS NEEDED, Disp: , Rfl:     TRESIBA FLEXTOUCH 100 UNIT/ML SOPN, Inject 29 Units into the skin nightly, Disp: , Rfl:     Ubrogepant (UBRELVY) 50 MG TABS, Take by mouth, Disp: , Rfl:     clopidogrel (PLAVIX) 75 MG tablet, Take 1 tablet by mouth daily, Disp: 90 tablet, Rfl: 3    methocarbamol (ROBAXIN) 750 MG tablet, Take 1 tablet by mouth as needed, Disp: , Rfl:     meclizine (ANTIVERT) 25 MG tablet, Take 1 tablet

## 2024-09-04 ENCOUNTER — APPOINTMENT (OUTPATIENT)
Dept: GENERAL RADIOLOGY | Age: 69
End: 2024-09-04
Payer: MEDICARE

## 2024-09-04 ENCOUNTER — HOSPITAL ENCOUNTER (EMERGENCY)
Age: 69
Discharge: HOME | End: 2024-09-04
Attending: EMERGENCY MEDICINE
Payer: MEDICARE

## 2024-09-04 VITALS
RESPIRATION RATE: 18 BRPM | HEIGHT: 67 IN | SYSTOLIC BLOOD PRESSURE: 109 MMHG | OXYGEN SATURATION: 97 % | WEIGHT: 150 LBS | TEMPERATURE: 97.4 F | HEART RATE: 60 BPM | BODY MASS INDEX: 23.54 KG/M2 | DIASTOLIC BLOOD PRESSURE: 64 MMHG

## 2024-09-04 DIAGNOSIS — S20.211A CONTUSION OF RIGHT CHEST WALL, INITIAL ENCOUNTER: Primary | ICD-10-CM

## 2024-09-04 DIAGNOSIS — S80.01XA CONTUSION OF RIGHT KNEE, INITIAL ENCOUNTER: ICD-10-CM

## 2024-09-04 LAB
BACTERIA URNS QL MICRO: ABNORMAL
BILIRUB UR QL STRIP: NEGATIVE
CLARITY UR: CLEAR
COLOR UR: YELLOW
GLUCOSE UR STRIP-MCNC: >=1000 MG/DL
HGB UR QL STRIP.AUTO: NEGATIVE
KETONES UR STRIP-MCNC: NEGATIVE MG/DL
LEUKOCYTE ESTERASE UR QL STRIP: NEGATIVE
NITRITE UR QL STRIP: NEGATIVE
PH UR STRIP: 5.5 [PH] (ref 5–9)
PROT UR STRIP-MCNC: NEGATIVE MG/DL
RBC #/AREA URNS HPF: ABNORMAL /HPF
SP GR UR STRIP: 1.01 (ref 1–1.03)
UROBILINOGEN UR STRIP-ACNC: 0.2 EU/DL (ref 0–1)
WBC #/AREA URNS HPF: ABNORMAL /HPF

## 2024-09-04 PROCEDURE — 99284 EMERGENCY DEPT VISIT MOD MDM: CPT

## 2024-09-04 PROCEDURE — 81001 URINALYSIS AUTO W/SCOPE: CPT

## 2024-09-04 PROCEDURE — 71101 X-RAY EXAM UNILAT RIBS/CHEST: CPT

## 2024-09-04 PROCEDURE — 73560 X-RAY EXAM OF KNEE 1 OR 2: CPT

## 2024-09-04 ASSESSMENT — ENCOUNTER SYMPTOMS
EYE DISCHARGE: 0
EYE REDNESS: 0
SINUS PRESSURE: 0
SHORTNESS OF BREATH: 0
ABDOMINAL DISTENTION: 0
SORE THROAT: 0
DIARRHEA: 0
WHEEZING: 0
COUGH: 0
NAUSEA: 0
BACK PAIN: 0
VOMITING: 0
EYE PAIN: 0

## 2024-09-04 ASSESSMENT — PAIN - FUNCTIONAL ASSESSMENT
PAIN_FUNCTIONAL_ASSESSMENT: 0-10
PAIN_FUNCTIONAL_ASSESSMENT: PREVENTS OR INTERFERES SOME ACTIVE ACTIVITIES AND ADLS

## 2024-09-04 ASSESSMENT — PAIN DESCRIPTION - FREQUENCY: FREQUENCY: CONTINUOUS

## 2024-09-04 ASSESSMENT — PAIN DESCRIPTION - PAIN TYPE: TYPE: ACUTE PAIN

## 2024-09-04 ASSESSMENT — PAIN DESCRIPTION - DESCRIPTORS
DESCRIPTORS: SHARP;DISCOMFORT
DESCRIPTORS_4: DISCOMFORT

## 2024-09-04 ASSESSMENT — PAIN DESCRIPTION - LOCATION
LOCATION_4: KNEE
LOCATION: RIB CAGE

## 2024-09-04 ASSESSMENT — PAIN DESCRIPTION - INTENSITY: RATING_4: 4

## 2024-09-04 ASSESSMENT — PAIN DESCRIPTION - ORIENTATION
ORIENTATION: RIGHT
ORIENTATION_4: RIGHT

## 2024-09-04 ASSESSMENT — PAIN SCALES - GENERAL: PAINLEVEL_OUTOF10: 7

## 2024-09-04 NOTE — ED PROVIDER NOTES
Assignment:  02/02    The nursing notes within the ED encounter and vital signs as below have been reviewed.   /64   Pulse 60   Temp 97.4 °F (36.3 °C) (Temporal)   Resp 18   Ht 1.7 m (5' 6.93\")   Wt 68 kg (150 lb)   SpO2 97%   BMI 23.54 kg/m²   Oxygen Saturation Interpretation: Normal      ------------------------------------------ PROGRESS NOTES ------------------------------------------  I have spoken with the patient and discussed today’s results, in addition to providing specific details for the plan of care and counseling regarding the diagnosis and prognosis.  Their questions are answered at this time and they are agreeable with the plan. I discussed at length with them reasons for immediate return here for re evaluation. They will followup with primary care by calling their office tomorrow.    Medications - No data to display      --------------------------------- ADDITIONAL PROVIDER NOTES ---------------------------------  At this time the patient is without objective evidence of an acute process requiring hospitalization or inpatient management.  They have remained hemodynamically stable throughout their entire ED visit and are stable for discharge with outpatient follow-up.     The plan has been discussed in detail and they are aware of the specific conditions for emergent return, as well as the importance of follow-up.      New Prescriptions    No medications on file     Patient's Medications   New Prescriptions    No medications on file   Previous Medications    ACCU-CHEK SOFTCLIX LANCETS MISC    use as directed 2 to 3 times a day    ALBUTEROL SULFATE HFA (PROVENTIL;VENTOLIN;PROAIR) 108 (90 BASE) MCG/ACT INHALER    INHALE TWO PUFFS BY MOUTH EVERY 4 TO 6 HOURS AS NEEDED    ANASTROZOLE (ARIMIDEX) 1 MG TABLET    Take 1 tablet by mouth daily    ASCORBIC ACID (VITAMIN C) 500 MG TABLET    Take 1 tablet by mouth 2 times daily    B COMPLEX-BIOTIN-FA (B-50 COMPLEX PO)    Take by mouth 2 times daily

## 2024-10-07 ENCOUNTER — TELEPHONE (OUTPATIENT)
Dept: CASE MANAGEMENT | Age: 69
End: 2024-10-07

## 2024-10-07 ENCOUNTER — HOSPITAL ENCOUNTER (OUTPATIENT)
Dept: INFUSION THERAPY | Age: 69
Discharge: HOME | End: 2024-10-07
Payer: MEDICARE

## 2024-10-07 ENCOUNTER — OFFICE VISIT (OUTPATIENT)
Dept: ONCOLOGY | Age: 69
End: 2024-10-07
Payer: MEDICARE

## 2024-10-07 VITALS
SYSTOLIC BLOOD PRESSURE: 119 MMHG | DIASTOLIC BLOOD PRESSURE: 66 MMHG | HEIGHT: 66 IN | WEIGHT: 156 LBS | HEART RATE: 54 BPM | OXYGEN SATURATION: 93 % | TEMPERATURE: 97.9 F | BODY MASS INDEX: 25.07 KG/M2

## 2024-10-07 DIAGNOSIS — Z17.0 MALIGNANT NEOPLASM OF AXILLARY TAIL OF LEFT BREAST IN FEMALE, ESTROGEN RECEPTOR POSITIVE (HCC): ICD-10-CM

## 2024-10-07 DIAGNOSIS — C50.612 MALIGNANT NEOPLASM OF AXILLARY TAIL OF LEFT BREAST IN FEMALE, ESTROGEN RECEPTOR POSITIVE (HCC): ICD-10-CM

## 2024-10-07 DIAGNOSIS — C50.612 MALIGNANT NEOPLASM OF AXILLARY TAIL OF LEFT BREAST IN FEMALE, ESTROGEN RECEPTOR POSITIVE (HCC): Primary | ICD-10-CM

## 2024-10-07 DIAGNOSIS — Z17.0 MALIGNANT NEOPLASM OF AXILLARY TAIL OF LEFT BREAST IN FEMALE, ESTROGEN RECEPTOR POSITIVE (HCC): Primary | ICD-10-CM

## 2024-10-07 LAB
ALBUMIN SERPL-MCNC: 4.1 G/DL (ref 3.5–5.2)
ALP SERPL-CCNC: 81 U/L (ref 35–104)
ALT SERPL-CCNC: 24 U/L (ref 0–32)
ANION GAP SERPL CALCULATED.3IONS-SCNC: 9 MMOL/L (ref 7–16)
AST SERPL-CCNC: 42 U/L (ref 0–31)
BASOPHILS # BLD: 0.03 K/UL (ref 0–0.2)
BASOPHILS NFR BLD: 1 % (ref 0–2)
BILIRUB SERPL-MCNC: 0.3 MG/DL (ref 0–1.2)
BUN SERPL-MCNC: 11 MG/DL (ref 6–23)
CALCIUM SERPL-MCNC: 9.4 MG/DL (ref 8.6–10.2)
CHLORIDE SERPL-SCNC: 103 MMOL/L (ref 98–107)
CO2 SERPL-SCNC: 26 MMOL/L (ref 22–29)
CREAT SERPL-MCNC: 0.9 MG/DL (ref 0.5–1)
EOSINOPHIL # BLD: 0.38 K/UL (ref 0.05–0.5)
EOSINOPHILS RELATIVE PERCENT: 7 % (ref 0–6)
ERYTHROCYTE [DISTWIDTH] IN BLOOD BY AUTOMATED COUNT: 13.3 % (ref 11.5–15)
GFR, ESTIMATED: 70 ML/MIN/1.73M2
GLUCOSE SERPL-MCNC: 89 MG/DL (ref 74–99)
HCT VFR BLD AUTO: 41.3 % (ref 34–48)
HGB BLD-MCNC: 14.1 G/DL (ref 11.5–15.5)
IMM GRANULOCYTES # BLD AUTO: <0.03 K/UL (ref 0–0.58)
IMM GRANULOCYTES NFR BLD: 0 % (ref 0–5)
LYMPHOCYTES NFR BLD: 1.87 K/UL (ref 1.5–4)
LYMPHOCYTES RELATIVE PERCENT: 33 % (ref 20–42)
MCH RBC QN AUTO: 34.2 PG (ref 26–35)
MCHC RBC AUTO-ENTMCNC: 34.1 G/DL (ref 32–34.5)
MCV RBC AUTO: 100.2 FL (ref 80–99.9)
MONOCYTES NFR BLD: 0.74 K/UL (ref 0.1–0.95)
MONOCYTES NFR BLD: 13 % (ref 2–12)
NEUTROPHILS NFR BLD: 47 % (ref 43–80)
NEUTS SEG NFR BLD: 2.68 K/UL (ref 1.8–7.3)
PLATELET # BLD AUTO: 233 K/UL (ref 130–450)
PMV BLD AUTO: 9.2 FL (ref 7–12)
POTASSIUM SERPL-SCNC: 4.4 MMOL/L (ref 3.5–5)
PROT SERPL-MCNC: 7.4 G/DL (ref 6.4–8.3)
RBC # BLD AUTO: 4.12 M/UL (ref 3.5–5.5)
SODIUM SERPL-SCNC: 138 MMOL/L (ref 132–146)
WBC OTHER # BLD: 5.7 K/UL (ref 4.5–11.5)

## 2024-10-07 PROCEDURE — 36415 COLL VENOUS BLD VENIPUNCTURE: CPT

## 2024-10-07 PROCEDURE — 99212 OFFICE O/P EST SF 10 MIN: CPT

## 2024-10-07 PROCEDURE — 85025 COMPLETE CBC W/AUTO DIFF WBC: CPT

## 2024-10-07 PROCEDURE — 80053 COMPREHEN METABOLIC PANEL: CPT

## 2024-10-07 RX ORDER — ANASTROZOLE 1 MG/1
1 TABLET ORAL DAILY
Qty: 90 TABLET | Refills: 0 | Status: SHIPPED | OUTPATIENT
Start: 2024-10-07 | End: 2025-01-05

## 2024-10-07 NOTE — PROGRESS NOTES
obtained.    Synoptic Checklist:       Case Summary: (Invasive Carcinoma of The Breast: Resection)  Standard(s): AJCC-UICC 8  Procedure: Excision  Specimen Laterality: Left  Tumor:       Tumor Site: Not applicable  Histologic Type: Invasive carcinoma of no special type (ductal)  Histologic Grade ([place] Histologic Score):       Glandular (Acinar) / Tubular Differentiation: 2  Nuclear Pleomorphism: 1  Mitotic Rate: 1  Overall ndGndrndanddndend:nd nd2nd Tumor Size:       Greatest dimension of largest invasive focus greater than 1 mm:  9mm  Additional Dimension in Millimeters (mm): 7 x 6mm       Tumor Focality: Single focus of invasive carcinoma       Number of Foci: 1       Ductal Carcinoma In Situ (DCIS): Not identified  Lobular Carcinoma In Situ (LCIS): Not identified.  Tumor Extent: Not applicable (skin, nipple, and skeletal muscle are  absent)       Lymphatic and / or Vascular Invasion: Not identified  Dermal Lymphatic and / or Vascular Invasion:No skin present  Microcalcifications: Not identified  Treatment Effect in the Breast: No known presurgical therapy  Treatment Effect in the Lymph Nodes: Not applicable       Margins:         Margin Status for Invasive Carcinoma: All margins negative for  invasive carcinoma       Distance from Invasive Carcinoma to Closest Margin: 4 mm  Closest Margin(s) to Invasive Carcinoma: Superior       Margin Status for DCIS: Not applicable (no DCIS in specimen)         Regional Lymph Nodes: Not applicable (no regional lymph nodes  submitted or found)    Distant Metastasis: Not applicable    pTNM Classification (AJCC 8th Edition):       Modified Classification: Not applicable  pT Category: pT1b: Tumor greater than 5 mm but less than or equal to  10 mm in greatest dimension  T Suffix: Not applicable  pN Category: pN not assigned (no nodes submitted or found)  N Suffix: Not applicable  pM Category: Not applicable - pM cannot be determined from the  submitted specimen(s)         Additional

## 2024-10-07 NOTE — TELEPHONE ENCOUNTER
Met with patient and family during her follow up appointment with Dr. Richard today.  Patient completed her active treatment for her Stage IA (T1b, Nx, M0) ER/MN+, Her2- left breast cancer.  States that she is doing well following radiation therapy treatment completion.  Reports that her appetite is good and she is sleeping well.  Upon inquiring, states that she is doing well with the Arimidex medication daily.  States not many hot flash side effects now.  Reports that she had them daily in the evening when she started the medication but now it's maybe once a month.  States that she experiences some mood swings at times.  Reviewed daily Calcium and Vitamin D supplement recommendations and NCCN DEXA Scan guidelines while on hormone therapy for breast cancer.  Provided support and encouragement.  Instructed patient in detail on her Cancer Treatment Summary and Survivorship Care Plan.  Provided with a written copy.  Aware that a copy will be sent to her PCP as well.  Provided written copies of Nutrition Following Cancer Treatment: Oncology Nutrition Guide, [facility] support group pamphlet, and Thriving and Surviving Post-Cancer Treatment: Nutritional and Emotional Support Services packet.  Patient aware of mammogram scheduled for 12/12/2024 during her next follow up appointment with Dr. Martha Reyes CNP at [facility].  Instructed patient to call with any questions or concerns.  Verbally agreed.  Patient appreciative of visit and information.  STEFAN Nowak, BSW, RN, OCN  Oncology Nurse Navigator

## 2024-11-27 NOTE — PROGRESS NOTES
comprised of an infarcted  papillary neoplasm.  At the periphery of the infarcted lesion, small  portions of invasive carcinoma are seen.  Within the individual tissue  cores, the maximum dimension of invasive carcinoma is 2 mm.  The  invasive component is provisional [place] histologic grade 1  (tubule score 3, nuclear score 1, mitotic score 1).  An E-cadherin  immunostain shows strong membranous labeling of neoplastic cells,  consistent with ductal derivation.  A p63 stain confirms the absence  of myoepithelial cells within the invasive component.  Intradepartmental consultation is obtained.     02/14/2024 she underwent left breast Magseed localized lumpectomy and left axillary sentinel lymph node biopsy per Dr. Alexander.    Diagnosis --  A.          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  Margins of resection are negative for malignancy.  Biopsy site is present.    Immunohistochemistry:  Performed on blocks A1, A2, A3, and A4:             P63: Negative for DCIS.     B.          Left Breast New Inferior, Stitch Castillo New Margin:       Benign breast and fibroadipose tissue, negative for malignancy.     C.          Left Breast Medial Margin Stitch Marks New Margin:       Fibroadipose tissue, negative for malignancy.       D.          Non-[place] Lymph Node:         Fibroadipose tissue, negative for malignancy.  Lymphoid tissue is not identified.    Comment:  Intradepartmental consultation obtained.    Synoptic Checklist:       Case Summary: (Invasive Carcinoma of The Breast: Resection)  Standard(s): AJCC-UICC 8  Procedure: Excision  Specimen Laterality: Left  Tumor:       Tumor Site: Not applicable  Histologic Type: Invasive carcinoma of no special type (ductal)  Histologic Grade ([place] Histologic Score):       Glandular (Acinar) / Tubular Differentiation: 2  Nuclear Pleomorphism: 1  Mitotic Rate: 1  Overall ndGndrndanddndend:nd nd2nd Tumor Size:

## 2024-12-12 ENCOUNTER — HOSPITAL ENCOUNTER (OUTPATIENT)
Dept: GENERAL RADIOLOGY | Age: 69
Discharge: HOME | End: 2024-12-14
Payer: MEDICARE

## 2024-12-12 ENCOUNTER — OFFICE VISIT (OUTPATIENT)
Dept: BREAST CENTER | Age: 69
End: 2024-12-12
Payer: MEDICARE

## 2024-12-12 VITALS
TEMPERATURE: 97.6 F | BODY MASS INDEX: 25.39 KG/M2 | HEART RATE: 51 BPM | RESPIRATION RATE: 20 BRPM | HEIGHT: 66 IN | OXYGEN SATURATION: 98 % | SYSTOLIC BLOOD PRESSURE: 114 MMHG | WEIGHT: 158 LBS | DIASTOLIC BLOOD PRESSURE: 64 MMHG

## 2024-12-12 VITALS — WEIGHT: 154 LBS | BODY MASS INDEX: 24.75 KG/M2 | HEIGHT: 66 IN

## 2024-12-12 DIAGNOSIS — I89.0 LYMPHEDEMA OF BREAST: ICD-10-CM

## 2024-12-12 DIAGNOSIS — Z12.31 VISIT FOR SCREENING MAMMOGRAM: ICD-10-CM

## 2024-12-12 DIAGNOSIS — Z17.0 MALIGNANT NEOPLASM OF LEFT BREAST IN FEMALE, ESTROGEN RECEPTOR POSITIVE, UNSPECIFIED SITE OF BREAST (HCC): ICD-10-CM

## 2024-12-12 DIAGNOSIS — C50.912 MALIGNANT NEOPLASM OF LEFT BREAST IN FEMALE, ESTROGEN RECEPTOR POSITIVE, UNSPECIFIED SITE OF BREAST (HCC): ICD-10-CM

## 2024-12-12 DIAGNOSIS — Z85.3 HISTORY OF BREAST CANCER: Primary | ICD-10-CM

## 2024-12-12 PROCEDURE — 77063 BREAST TOMOSYNTHESIS BI: CPT

## 2024-12-12 PROCEDURE — 99213 OFFICE O/P EST LOW 20 MIN: CPT | Performed by: NURSE PRACTITIONER

## 2024-12-12 ASSESSMENT — ENCOUNTER SYMPTOMS
BACK PAIN: 0
ABDOMINAL DISTENTION: 0
WHEEZING: 0
ABDOMINAL PAIN: 0
RESPIRATORY NEGATIVE: 1
ANAL BLEEDING: 0
DIARRHEA: 0
VOMITING: 0
SHORTNESS OF BREATH: 0
CONSTIPATION: 0
COUGH: 0
CHEST TIGHTNESS: 0
NAUSEA: 0
CHOKING: 0

## 2024-12-12 NOTE — PATIENT INSTRUCTIONS
Occupational Therapy will call to schedule consult. 6 month follow up. Any questions or concerns, please contact the office at [phone].

## 2024-12-16 ENCOUNTER — TELEPHONE (OUTPATIENT)
Dept: OCCUPATIONAL THERAPY | Age: 69
End: 2024-12-16

## 2024-12-16 NOTE — TELEPHONE ENCOUNTER
[facility] OCCUPATIONAL THERAPY-[place]MPHED[place] [address]  Phone: [phone]; Fax: [phone]      Date: 2024  Patient Name: Darlin Cobb        : 1955     Called patient to schedule lymphedema evaluation. Left a voice messaging asking patient to call back to schedule.       Yulisa Ferrer OT Date: 2024

## 2024-12-19 ENCOUNTER — EVALUATION (OUTPATIENT)
Dept: OCCUPATIONAL THERAPY | Age: 69
End: 2024-12-19
Payer: MEDICARE

## 2024-12-19 DIAGNOSIS — I89.0 LYMPHEDEMA OF BREAST: Primary | ICD-10-CM

## 2024-12-19 PROCEDURE — 97165 OT EVAL LOW COMPLEX 30 MIN: CPT | Performed by: OCCUPATIONAL THERAPIST

## 2024-12-19 NOTE — PROGRESS NOTES
[x]Lipodermatosclerosis     Vision: glasses        Hearing: WFL     ADL  Feeding:  indep  Grooming:  indep  Bathing:  indep  UE Dressing:  indep  LE Dressing:  indep  Toileting:  indep  Transfer:  indep    UE ASSESSMENT:   Hand Dominance is right    ROM: WFL overall    Strength generally:4/5MMT    Sensation: WFL    Lymphedema Measurements:  Lymphedema Upper Extremity Measurements    Measurements are made in 4cm increments and are circumferential.    CM Follow up #4  Left -  Follow up #3  Left -  Follow up #2  Left -  Follow up #1  Left -  Eval Left  12/19/2024    Follow up #4  Right -  Follow up #3  Right -  Follow up #2  Right -  Follow up #1 Right -  Eval Right    12/19/2024    palm     19     18   wrist     16.5     16   4cm     17     17   8cm     19     20   12cm     21.5     23.5   16cm     24     25   20cm     25     25   24cm     25     25.25   28cm     25.75     25   32cm     28     27.25   36cm     29.25     28   40cm     31     30   44cm             Chest     108        Abdominals     103          Fingers are measured in cm and between mp and pip.  Digit Follow up #4  Left -  Follow up #3  Left -  Follow up #2  Left -  Follow up #1  Left -  Evaluation  Left -   12/19/2024  Follow up #4  Right -  Follow up #3  Right -  Follow up #2  Right -  Follow up #1  Right -  Evaluation Right -   12/19/2024    First Digit     6     6.25   Second Digit     6.5     6.25   Third Digit     6.25     6.5   Fourth Digit     6     6   Fifth Digit     5.25     5.75     ROM #1: 12/19/2024   R flex:164   ABD: 155           L flex: 151        ABD:128 ROM #2:   R flex:   ABD:            L flex:         ABD:   ROM #3:   R flex:   ABD:            L flex:         ABD: ROM #4:   R flex:   ABD:            L flex:         ABD:         Functional Outcome Measurements:  -Lymphedema Life Impact Scale Score:  20  Percent Impairment:  28%    -Functional Assessment of Chronic Illness Therapy Fatigue Scale Version 4:13/52; severe

## 2024-12-23 ENCOUNTER — TREATMENT (OUTPATIENT)
Dept: OCCUPATIONAL THERAPY | Age: 69
End: 2024-12-23
Payer: MEDICARE

## 2024-12-23 DIAGNOSIS — I89.0 LYMPHEDEMA OF BREAST: Primary | ICD-10-CM

## 2024-12-23 PROCEDURE — 97140 MANUAL THERAPY 1/> REGIONS: CPT | Performed by: OCCUPATIONAL THERAPIST

## 2024-12-23 PROCEDURE — 97535 SELF CARE MNGMENT TRAINING: CPT | Performed by: OCCUPATIONAL THERAPIST

## 2024-12-23 NOTE — PROGRESS NOTES
OCCUPATIONAL THERAPY LYMPHEDEMA TREATMENT NOTE    [facility] OCCUPATIONAL THERAPY-LYMPHEDEMA [place] [address]  Phone: [phone]; Fax: [phone]       Date:  2024  Initial Evaluation Date: 2024                               Evaluating Therapist: Yulisa Ferrer, HAWA/L, CLT     Patient Name:  Darlin Cobb               :  1955     Restrictions/Precautions:  L UE, fall risk  Diagnosis:  Malignant neoplasm of left breast in female, estrogen receptor positive, unspecified site of breast (HCC) (C50.912,Z17.0)  Lymphedema of breast (I89.0)                                                             Date of Surgery/Injury: 2024 she underwent left breast Magseed localized lumpectomy and left axillary sentinel lymph node biopsy per Dr. Alexander ;2023 she underwent left breast stereotactic core needle biopsy ; post lumpectomy L breast     Insurance/Certification information:    Payor: [facility] MEDICARE [[id number]]  Plan: [place] [employer] [place] MEDICARE [[id number]]  ID: [id number]  Plan of care signed (Y/N): Yes 24  Visit# / total visits: evaluation + 1     Referring Practitioner/NPI#:    Ashia Reyes APRN - CNP NPI: [id number]      Specific Practitioner Orders: Evaluation and treatment; PREHAB      Assessment of current deficits   []Pain  []Skin Integrity   [x]Lymphedema   []Functional transfers/mobility   []ADLs   []Strength    []Cognition  []IADLs   []Safety Awareness   []  Motor Endurance    []Fine Motor Coordination   []Balance   []Vision/perception  []Sensation []Gross Motor Coordination  []ROM      OT PLAN OF CARE   OT POC based on physician orders, patient diagnosis and results of clinical assessment     Frequency/Duration: 1-2x/week for 12 treatment sessions from 2024 through 3/13/25   Projected units: 48  Approved days/units: 6 approved through 24     Specific OT Treatment to

## 2024-12-26 ENCOUNTER — TREATMENT (OUTPATIENT)
Dept: OCCUPATIONAL THERAPY | Age: 69
End: 2024-12-26
Payer: MEDICARE

## 2024-12-26 DIAGNOSIS — C50.912 MALIGNANT NEOPLASM OF LEFT BREAST IN FEMALE, ESTROGEN RECEPTOR POSITIVE, UNSPECIFIED SITE OF BREAST (HCC): Primary | ICD-10-CM

## 2024-12-26 DIAGNOSIS — Z17.0 MALIGNANT NEOPLASM OF LEFT BREAST IN FEMALE, ESTROGEN RECEPTOR POSITIVE, UNSPECIFIED SITE OF BREAST (HCC): Primary | ICD-10-CM

## 2024-12-26 LAB
ALBUMIN SERPL-MCNC: 4 G/DL (ref 3.5–5.2)
ALP SERPL-CCNC: 78 U/L (ref 35–104)
ALT SERPL-CCNC: 19 U/L (ref 0–32)
ANION GAP SERPL CALCULATED.3IONS-SCNC: 13 MMOL/L (ref 7–16)
AST SERPL-CCNC: 33 U/L (ref 0–31)
BASOPHILS # BLD: 0.04 K/UL (ref 0–0.2)
BASOPHILS NFR BLD: 1 % (ref 0–2)
BILIRUB SERPL-MCNC: 0.3 MG/DL (ref 0–1.2)
BUN SERPL-MCNC: 16 MG/DL (ref 6–23)
CALCIUM SERPL-MCNC: 9.4 MG/DL (ref 8.6–10.2)
CHLORIDE SERPL-SCNC: 102 MMOL/L (ref 98–107)
CHOLEST SERPL-MCNC: 150 MG/DL
CO2 SERPL-SCNC: 27 MMOL/L (ref 22–29)
CREAT SERPL-MCNC: 0.9 MG/DL (ref 0.5–1)
EOSINOPHIL # BLD: 0.24 K/UL (ref 0.05–0.5)
EOSINOPHILS RELATIVE PERCENT: 4 % (ref 0–6)
ERYTHROCYTE [DISTWIDTH] IN BLOOD BY AUTOMATED COUNT: 13 % (ref 11.5–15)
GFR, ESTIMATED: 71 ML/MIN/1.73M2
GLUCOSE SERPL-MCNC: 111 MG/DL (ref 74–99)
HCT VFR BLD AUTO: 41.5 % (ref 34–48)
HDLC SERPL-MCNC: 38 MG/DL
HGB BLD-MCNC: 13.6 G/DL (ref 11.5–15.5)
IMM GRANULOCYTES # BLD AUTO: <0.03 K/UL (ref 0–0.58)
IMM GRANULOCYTES NFR BLD: 0 % (ref 0–5)
LDLC SERPL CALC-MCNC: 78 MG/DL
LYMPHOCYTES NFR BLD: 1.87 K/UL (ref 1.5–4)
LYMPHOCYTES RELATIVE PERCENT: 33 % (ref 20–42)
MCH RBC QN AUTO: 33.7 PG (ref 26–35)
MCHC RBC AUTO-ENTMCNC: 32.8 G/DL (ref 32–34.5)
MCV RBC AUTO: 102.7 FL (ref 80–99.9)
MONOCYTES NFR BLD: 0.71 K/UL (ref 0.1–0.95)
MONOCYTES NFR BLD: 12 % (ref 2–12)
NEUTROPHILS NFR BLD: 50 % (ref 43–80)
NEUTS SEG NFR BLD: 2.85 K/UL (ref 1.8–7.3)
PLATELET # BLD AUTO: 214 K/UL (ref 130–450)
PMV BLD AUTO: 9.7 FL (ref 7–12)
POTASSIUM SERPL-SCNC: 4.5 MMOL/L (ref 3.5–5)
PROT SERPL-MCNC: 7.2 G/DL (ref 6.4–8.3)
RBC # BLD AUTO: 4.04 M/UL (ref 3.5–5.5)
SODIUM SERPL-SCNC: 142 MMOL/L (ref 132–146)
TRIGL SERPL-MCNC: 171 MG/DL
TSH SERPL DL<=0.05 MIU/L-ACNC: 4.81 UIU/ML (ref 0.27–4.2)
VLDLC SERPL CALC-MCNC: 34 MG/DL
WBC OTHER # BLD: 5.7 K/UL (ref 4.5–11.5)

## 2024-12-26 PROCEDURE — 97535 SELF CARE MNGMENT TRAINING: CPT | Performed by: OCCUPATIONAL THERAPIST

## 2024-12-26 PROCEDURE — 97140 MANUAL THERAPY 1/> REGIONS: CPT | Performed by: OCCUPATIONAL THERAPIST

## 2024-12-26 NOTE — PROGRESS NOTES
[] With Stocking     [] With Sleeve     [] Kinesiotaped    [] Unna Boot    [x] With Alternative Compression: TG to L UE    Skin Integrity:  [] Normal [x] Dry  []Rough []Moist []Rash  Location of problem area/s:  [x]Hyperplasia   []Hyperkeratosis  [x]Hyperpigmentation  []Papillomas  []Lymphorrhea  [x]Lipodermatosclerosis   [] Other    Color:  [] Normal [] Mottled [x] Flushed [] Other/Description  Location of problem area/s:    Edema:  Edema Location: left lateral breast and axilla  [x] 1+ Edema [] 2+ Edema  [] 3+ Edema [] 4+ Edema  Edema Location:left nipple, medial breast, inferior breast  [] 1+ Edema [] 2+ Edema  [x] 3+ Edema [] 4+ Edema  Edema Location: abdominal  [] 1+ Edema [x] 2+ Edema  [] 3+ Edema [] 4+ Edema    Subjective: Pt was less confused and overwhelmed during session, but continues to present with anxiety. Reminded to focus on one step at a time.    Objective:  [x] Measurement [x]Manual Lymph Drainage  []Bandaging   []Kinesiotaping [x] Education    [x]Self Massage   []Self Bandaging [x] Exercise    []Wound Care  [x]Hygiene  [] Other    Assessment:  Knowledge of home program:   [] Good [x] Fair - [] Poor  Patient is programming at home:             [x] Yes  [] No  Family is assisting with programming: [] Yes  [x] No  Home programming is consistent:             [x] Yes  [] No  Other:   Response to treatment: Fair response as far as fluid movement by the end of session. Pt presenting with only min fluid after massage. Suspecting that the patient will have a difficult time with follow through overall. Anxious throughout the session. Might be appropriate for a pump for management.   Instructions for patient:   [x] Verbal [x] Written  Instructions addressed:  [] Measurement [x]Manual Lymph Drainage  []Bandaging   []Kinesiotaping [x] Education    [x]Self Massage   []Self Bandaging [] Exercise    []Wound Care  [x]Hygiene  [] Other      Time In: 0740            Time Out: 0840                      Timed Code

## 2025-01-02 ENCOUNTER — TREATMENT (OUTPATIENT)
Dept: OCCUPATIONAL THERAPY | Age: 70
End: 2025-01-02
Payer: MEDICARE

## 2025-01-02 DIAGNOSIS — I89.0 LYMPHEDEMA OF BREAST: Primary | ICD-10-CM

## 2025-01-02 PROCEDURE — 97140 MANUAL THERAPY 1/> REGIONS: CPT | Performed by: OCCUPATIONAL THERAPIST

## 2025-01-02 PROCEDURE — 97110 THERAPEUTIC EXERCISES: CPT | Performed by: OCCUPATIONAL THERAPIST

## 2025-01-02 PROCEDURE — 97535 SELF CARE MNGMENT TRAINING: CPT | Performed by: OCCUPATIONAL THERAPIST

## 2025-01-02 NOTE — PROGRESS NOTES
will be fitted for appropriate compression garments for long term management of lymphedema.  3- Patient will present with decreased limb volume in the involved extremity from min to trace  for improved indep in client centered tasks.      Restrictions/Precautions:  [] No blood pressure/blood draw in: [x] Left Upper Extremity     [] Right Upper Extremity        [x] Fall Risk       Intervention:   []Other    Pain:  [] No    [x] Yes  Location: axilla  Pain Rating (0-10 pain scale): 4/10  Pain Description: heaviness, tight (pulling), and uncomfortable   Interruption of Treatment [] Yes  [x] No    Came to Clinic:  [] Bandaged    [x]Unbandaged    [] With Stocking     [] With Sleeve     [] Kinesiotaped    [] Unna Boot    [x] With Alternative Compression: TG to L UE    Skin Integrity:  [] Normal [x] Dry  []Rough []Moist []Rash  Location of problem area/s:  [x]Hyperplasia   []Hyperkeratosis  [x]Hyperpigmentation  []Papillomas  []Lymphorrhea  [x]Lipodermatosclerosis   [] Other    Color:  [] Normal [] Mottled [x] Flushed [] Other/Description  Location of problem area/s:    Edema:  Edema Location: left lateral breast and axilla  [x] 1+ Edema [] 2+ Edema  [] 3+ Edema [] 4+ Edema  Edema Location:left nipple, medial breast, inferior breast  [] 1+ Edema [] 2+ Edema  [x] 3+ Edema [] 4+ Edema  Edema Location: abdominal  [] 1+ Edema [x] 2+ Edema  [] 3+ Edema [] 4+ Edema    Subjective: Pt was less confused and overwhelmed during session, but continues to present with anxiety. Reminded to focus on one step at a time.    Objective:  [x] Measurement [x]Manual Lymph Drainage  []Bandaging   []Kinesiotaping [x] Education    [x]Self Massage   []Self Bandaging [x] Exercise    []Wound Care  [x]Hygiene  [] Other    Assessment:  Knowledge of home program:   [] Good [x] Fair - [] Poor  Patient is programming at home:             [x] Yes  [] No  Family is assisting with programming: [] Yes  [x] No  Home programming is consistent:             [x]

## 2025-01-09 ENCOUNTER — TREATMENT (OUTPATIENT)
Dept: OCCUPATIONAL THERAPY | Age: 70
End: 2025-01-09
Payer: MEDICARE

## 2025-01-09 DIAGNOSIS — Z17.0 MALIGNANT NEOPLASM OF LEFT BREAST IN FEMALE, ESTROGEN RECEPTOR POSITIVE, UNSPECIFIED SITE OF BREAST (HCC): Primary | ICD-10-CM

## 2025-01-09 DIAGNOSIS — C50.912 MALIGNANT NEOPLASM OF LEFT BREAST IN FEMALE, ESTROGEN RECEPTOR POSITIVE, UNSPECIFIED SITE OF BREAST (HCC): Primary | ICD-10-CM

## 2025-01-09 PROCEDURE — 97110 THERAPEUTIC EXERCISES: CPT | Performed by: OCCUPATIONAL THERAPIST

## 2025-01-09 PROCEDURE — 97535 SELF CARE MNGMENT TRAINING: CPT | Performed by: OCCUPATIONAL THERAPIST

## 2025-01-09 PROCEDURE — 97140 MANUAL THERAPY 1/> REGIONS: CPT | Performed by: OCCUPATIONAL THERAPIST

## 2025-01-09 NOTE — PROGRESS NOTES
OCCUPATIONAL THERAPY LYMPHEDEMA TREATMENT NOTE    [facility] OCCUPATIONAL THERAPY-[place]MPHED[place] [address]  Phone: [phone]; Fax: [phone]       Date:  2025  Initial Evaluation Date: 2024                               Evaluating Therapist: Yulisa Ferrer, HAWA/L, CLT     Patient Name:  Darlin Cobb               :  1955     Restrictions/Precautions:  L UE, fall risk  Diagnosis:  Malignant neoplasm of left breast in female, estrogen receptor positive, unspecified site of breast (HCC) (C50.912,Z17.0)  Lymphedema of breast (I89.0)                                                             Date of Surgery/Injury: 2024 she underwent left breast Magseed localized lumpectomy and left axillary sentinel lymph node biopsy per Dr. Alexander ;2023 she underwent left breast stereotactic core needle biopsy ; post lumpectomy L breast     Insurance/Certification information:    Payor: [facility] MEDICARE [[id number]]  Plan: [place] [employer] [place] MEDICARE [[id number]]  ID: [id number]  Plan of care signed (Y/N): Yes 24  Visit# / total visits: evaluation + 4     Referring Practitioner/NPI#:    Ashia Reyes APRN - CNP NPI: [id number]      Specific Practitioner Orders: Evaluation and treatment; PREHAB      Assessment of current deficits   []Pain  []Skin Integrity   [x]Lymphedema   []Functional transfers/mobility   []ADLs   []Strength    []Cognition  []IADLs   []Safety Awareness   []  Motor Endurance    []Fine Motor Coordination   []Balance   []Vision/perception  []Sensation []Gross Motor Coordination  []ROM      OT PLAN OF CARE   OT POC based on physician orders, patient diagnosis and results of clinical assessment     Frequency/Duration: 1-2x/week for 12 treatment sessions from 2024 through 3/13/25   Projected units: 48  Approved days/units: 6 approved through 24     Specific OT Treatment to include:

## 2025-01-23 ENCOUNTER — TREATMENT (OUTPATIENT)
Dept: OCCUPATIONAL THERAPY | Age: 70
End: 2025-01-23

## 2025-01-23 DIAGNOSIS — I89.0 LYMPHEDEMA OF BREAST: Primary | ICD-10-CM

## 2025-01-23 NOTE — PROGRESS NOTES
OCCUPATIONAL THERAPY LYMPHEDEMA UPDATE    [facility] OCCUPATIONAL THERAPY-[place]MPHED[place] [address]  Phone: [phone]; Fax: [phone]    Date:  2025    Initial Evaluation Date: 2024                               Evaluating Therapist: Yulisa Ferrer, HAWA/L, CLT     Patient Name:  Darlin Cobb               :  1955     Restrictions/Precautions:  L UE, fall risk  Diagnosis:  Malignant neoplasm of left breast in female, estrogen receptor positive, unspecified site of breast (HCC) (C50.912,Z17.0)  Lymphedema of breast (I89.0)                                                             Date of Surgery/Injury: 2024 she underwent left breast Magseed localized lumpectomy and left axillary sentinel lymph node biopsy per Dr. Alexander ;2023 she underwent left breast stereotactic core needle biopsy ; post lumpectomy L breast     Insurance/Certification information:    Payor: [facility] MEDICARE [[id number]]  Plan: [place] [employer] [place] MEDICARE [[id number]]  ID: [id number]  Plan of care signed (Y/N): Yes 24  Visit# / total visits: evaluation + 5    Cancels/No-shows to date: 0    Current update duration from 24 to 2025  Last seen by therapist:  TODAY    Patient reaction to treatment:  Despite regular and compliant use of conservative treatments for 4+ weeks, including exercise, MLD, elevation and compression, she continues to present with lymphedema. Patient has a good understanding of skin care management and the affects of sodium/diet on their lymphedema. She is very limited with memory and cognitive status and is often detrimental in her progress and success, moving fluid in the opposite direction and with decreased understanding of hand placement despite thorough education. Pt is also limited to 6 visits with therapist through 2025. A good candidate for device at this time. Measurements listed

## 2025-02-05 ENCOUNTER — OFFICE VISIT (OUTPATIENT)
Dept: ONCOLOGY | Age: 70
End: 2025-02-05
Payer: MEDICARE

## 2025-02-05 ENCOUNTER — HOSPITAL ENCOUNTER (OUTPATIENT)
Dept: INFUSION THERAPY | Age: 70
Discharge: HOME | End: 2025-02-05
Payer: MEDICARE

## 2025-02-05 VITALS
OXYGEN SATURATION: 95 % | SYSTOLIC BLOOD PRESSURE: 126 MMHG | HEIGHT: 66 IN | BODY MASS INDEX: 25.41 KG/M2 | DIASTOLIC BLOOD PRESSURE: 73 MMHG | HEART RATE: 50 BPM | WEIGHT: 158.1 LBS | TEMPERATURE: 97.6 F

## 2025-02-05 DIAGNOSIS — Z17.0 MALIGNANT NEOPLASM OF AXILLARY TAIL OF LEFT BREAST IN FEMALE, ESTROGEN RECEPTOR POSITIVE (HCC): ICD-10-CM

## 2025-02-05 DIAGNOSIS — Z17.0 MALIGNANT NEOPLASM OF AXILLARY TAIL OF LEFT BREAST IN FEMALE, ESTROGEN RECEPTOR POSITIVE (HCC): Primary | ICD-10-CM

## 2025-02-05 DIAGNOSIS — C50.612 MALIGNANT NEOPLASM OF AXILLARY TAIL OF LEFT BREAST IN FEMALE, ESTROGEN RECEPTOR POSITIVE (HCC): ICD-10-CM

## 2025-02-05 DIAGNOSIS — C50.612 MALIGNANT NEOPLASM OF AXILLARY TAIL OF LEFT BREAST IN FEMALE, ESTROGEN RECEPTOR POSITIVE (HCC): Primary | ICD-10-CM

## 2025-02-05 LAB
ALBUMIN SERPL-MCNC: 4.4 G/DL (ref 3.5–5.2)
ALP SERPL-CCNC: 94 U/L (ref 35–104)
ALT SERPL-CCNC: 31 U/L (ref 0–32)
ANION GAP SERPL CALCULATED.3IONS-SCNC: 9 MMOL/L (ref 7–16)
AST SERPL-CCNC: 46 U/L (ref 0–31)
BASOPHILS # BLD: 0.03 K/UL (ref 0–0.2)
BASOPHILS NFR BLD: 1 % (ref 0–2)
BILIRUB SERPL-MCNC: 0.2 MG/DL (ref 0–1.2)
BUN SERPL-MCNC: 13 MG/DL (ref 6–23)
CALCIUM SERPL-MCNC: 9.9 MG/DL (ref 8.6–10.2)
CHLORIDE SERPL-SCNC: 100 MMOL/L (ref 98–107)
CO2 SERPL-SCNC: 30 MMOL/L (ref 22–29)
CREAT SERPL-MCNC: 1 MG/DL (ref 0.5–1)
EOSINOPHIL # BLD: 0.19 K/UL (ref 0.05–0.5)
EOSINOPHILS RELATIVE PERCENT: 3 % (ref 0–6)
ERYTHROCYTE [DISTWIDTH] IN BLOOD BY AUTOMATED COUNT: 12.9 % (ref 11.5–15)
GFR, ESTIMATED: 63 ML/MIN/1.73M2
GLUCOSE SERPL-MCNC: 124 MG/DL (ref 74–99)
HCT VFR BLD AUTO: 45.6 % (ref 34–48)
HGB BLD-MCNC: 15.3 G/DL (ref 11.5–15.5)
IMM GRANULOCYTES # BLD AUTO: <0.03 K/UL (ref 0–0.58)
IMM GRANULOCYTES NFR BLD: 0 % (ref 0–5)
LYMPHOCYTES NFR BLD: 2.08 K/UL (ref 1.5–4)
LYMPHOCYTES RELATIVE PERCENT: 33 % (ref 20–42)
MCH RBC QN AUTO: 33.8 PG (ref 26–35)
MCHC RBC AUTO-ENTMCNC: 33.6 G/DL (ref 32–34.5)
MCV RBC AUTO: 100.7 FL (ref 80–99.9)
MONOCYTES NFR BLD: 0.63 K/UL (ref 0.1–0.95)
MONOCYTES NFR BLD: 10 % (ref 2–12)
NEUTROPHILS NFR BLD: 53 % (ref 43–80)
NEUTS SEG NFR BLD: 3.36 K/UL (ref 1.8–7.3)
PLATELET # BLD AUTO: 212 K/UL (ref 130–450)
PMV BLD AUTO: 9.1 FL (ref 7–12)
POTASSIUM SERPL-SCNC: 4.4 MMOL/L (ref 3.5–5)
PROT SERPL-MCNC: 8.1 G/DL (ref 6.4–8.3)
RBC # BLD AUTO: 4.53 M/UL (ref 3.5–5.5)
SODIUM SERPL-SCNC: 139 MMOL/L (ref 132–146)
WBC OTHER # BLD: 6.3 K/UL (ref 4.5–11.5)

## 2025-02-05 PROCEDURE — 99212 OFFICE O/P EST SF 10 MIN: CPT

## 2025-02-05 PROCEDURE — 80053 COMPREHEN METABOLIC PANEL: CPT

## 2025-02-05 PROCEDURE — 36415 COLL VENOUS BLD VENIPUNCTURE: CPT

## 2025-02-05 PROCEDURE — 85025 COMPLETE CBC W/AUTO DIFF WBC: CPT

## 2025-02-05 RX ORDER — ANASTROZOLE 1 MG/1
1 TABLET ORAL DAILY
Qty: 90 TABLET | Refills: 0 | Status: SHIPPED | OUTPATIENT
Start: 2025-02-05 | End: 2025-05-06

## 2025-02-05 NOTE — PROGRESS NOTES
[facility]  [address] [place] [address].   [place] [address]        Pt Name: Darlin Cobb  YOB: 1955  Date of evaluation: 2/5/2025  Primary Care Physician: [place] DO  Reason for evaluation:   Chief Complaint   Patient presents with    Follow-up     Malignant neoplasm of axillary tail of left breast in female, estrogen receptor positive (HCC)              Subjective:  Here for yearly follow-up.  Feels better than her hemoglobin is improved to the normal range.    Was C/O dizziness and giddiness,headaches .   MRI Brain was ordered by PCP.    It was negative except for mild sphenoid sinusitis.    C/O right shoulder pain. Received cortisone injection right shoulder by Dr. Green on 3/18/19. It helped her pain temporarily.   S/P right shoulder arthroscopy with subacromial arch decompression,  Labral debridement, Debridement of partial thickness rotator cuff tear, and  biceps tenotomy on 8/30/2019 by Dr. Jeffrey Green.        OBJECTIVE:  VITALS:  height is 1.676 m (5' 6\") and weight is 71.7 kg (158 lb 1.6 oz). Her temperature is 97.6 °F (36.4 °C). Her blood pressure is 126/73 and her pulse is 50. Her oxygen saturation is 95%.   Physical Exam:  Performance Status: 0  Well developed, well nourished female  EYES: sclera non-icteric   ENT: oropharynx clear.   NECK: No lymphadenopathy. Mild soft tissue swelling in the right neck without palpable lymphadenopathy.  BREASTS : Well-healed left lumpectomy and left axillary dissection scarS  HEART: Regular rate and rhythm.   LUNGS: Clear.  ABDOMEN: Soft, non-tender.  No mass or organomegaly.  EXTREMITIES: without edema.  NEUROLOGIC: No focal deficits.      Medications  Prior to Admission medications    Medication Sig Start Date End Date Taking? Authorizing Provider   FARXIGA 10 MG tablet Take 1 tablet by mouth daily 5/22/24  Yes Provider, MD Promise   hydrOXYzine pamoate (VISTARIL) 25 MG capsule Take 1 capsule by mouth 4 times daily as needed for

## 2025-04-21 ENCOUNTER — TELEPHONE (OUTPATIENT)
Dept: BREAST CENTER | Age: 70
End: 2025-04-21

## 2025-04-21 NOTE — TELEPHONE ENCOUNTER
RN received a voicemail in regards to wanting to cancel her upcoming appointments with NP. RN attempted to contact patient to discuss if she would like to reschedule rather then cancel. RN reached patient voicemail and left detailed message of why was calling and office number for patient to call office back.      Electronically signed by Shagufta Barragan RN on 4/21/25 at 1:35 PM EDT

## 2025-04-22 NOTE — TELEPHONE ENCOUNTER
RN received a voicemail back from patient returning my call. RN contact patient back to discuss if she wanted to cancel or reschedule her appointments. Patient states she doesn't want to come to [place] any wants to keep her care in [place] with . RN verbalized understanding and advised if she needs to see our office again to call us. Patient verbalized understanding. Patients appointments were canceled.       Electronically signed by Shagufta Barragan RN on 4/22/25 at 8:54 AM EDT

## 2025-04-25 RX ORDER — ANASTROZOLE 1 MG/1
1 TABLET ORAL DAILY
Qty: 90 TABLET | Refills: 0 | Status: SHIPPED | OUTPATIENT
Start: 2025-04-25 | End: 2025-07-24

## 2025-06-07 ENCOUNTER — APPOINTMENT (OUTPATIENT)
Dept: GENERAL RADIOLOGY | Age: 70
End: 2025-06-07
Payer: MEDICARE

## 2025-06-07 ENCOUNTER — HOSPITAL ENCOUNTER (EMERGENCY)
Age: 70
Discharge: HOME | End: 2025-06-07
Attending: FAMILY MEDICINE
Payer: MEDICARE

## 2025-06-07 VITALS
OXYGEN SATURATION: 99 % | SYSTOLIC BLOOD PRESSURE: 154 MMHG | RESPIRATION RATE: 18 BRPM | TEMPERATURE: 98.1 F | HEART RATE: 56 BPM | DIASTOLIC BLOOD PRESSURE: 91 MMHG

## 2025-06-07 DIAGNOSIS — S01.512A LACERATION OF BUCCAL MUCOSA, INITIAL ENCOUNTER: ICD-10-CM

## 2025-06-07 DIAGNOSIS — S00.33XA CONTUSION OF NOSE, INITIAL ENCOUNTER: ICD-10-CM

## 2025-06-07 DIAGNOSIS — S80.02XA CONTUSION OF LEFT KNEE, INITIAL ENCOUNTER: ICD-10-CM

## 2025-06-07 DIAGNOSIS — S92.354A CLOSED NONDISPLACED FRACTURE OF FIFTH METATARSAL BONE OF RIGHT FOOT, INITIAL ENCOUNTER: Primary | ICD-10-CM

## 2025-06-07 PROCEDURE — 99283 EMERGENCY DEPT VISIT LOW MDM: CPT

## 2025-06-07 PROCEDURE — 73560 X-RAY EXAM OF KNEE 1 OR 2: CPT

## 2025-06-07 PROCEDURE — 70160 X-RAY EXAM OF NASAL BONES: CPT

## 2025-06-07 PROCEDURE — 73030 X-RAY EXAM OF SHOULDER: CPT

## 2025-06-07 PROCEDURE — 73610 X-RAY EXAM OF ANKLE: CPT

## 2025-06-07 PROCEDURE — 73630 X-RAY EXAM OF FOOT: CPT

## 2025-06-07 PROCEDURE — 6370000000 HC RX 637 (ALT 250 FOR IP): Performed by: FAMILY MEDICINE

## 2025-06-07 RX ORDER — IBUPROFEN 800 MG/1
800 TABLET, FILM COATED ORAL ONCE
Status: COMPLETED | OUTPATIENT
Start: 2025-06-07 | End: 2025-06-07

## 2025-06-07 RX ADMIN — IBUPROFEN 800 MG: 800 TABLET, FILM COATED ORAL at 18:43

## 2025-06-07 ASSESSMENT — PAIN DESCRIPTION - PAIN TYPE: TYPE: ACUTE PAIN

## 2025-06-07 ASSESSMENT — PAIN DESCRIPTION - DESCRIPTORS: DESCRIPTORS: DISCOMFORT

## 2025-06-07 ASSESSMENT — PAIN DESCRIPTION - FREQUENCY: FREQUENCY: CONTINUOUS

## 2025-06-07 ASSESSMENT — PAIN - FUNCTIONAL ASSESSMENT
PAIN_FUNCTIONAL_ASSESSMENT: PREVENTS OR INTERFERES SOME ACTIVE ACTIVITIES AND ADLS
PAIN_FUNCTIONAL_ASSESSMENT: 0-10

## 2025-06-07 ASSESSMENT — PAIN SCALES - GENERAL: PAINLEVEL_OUTOF10: 10

## 2025-06-07 ASSESSMENT — PAIN DESCRIPTION - LOCATION: LOCATION: FACE

## 2025-06-07 NOTE — ED PROVIDER NOTES
HPI:  6/7/25,   Time: 5:54 PM EDT         Darlin Cobb is a 69 y.o. female presenting to the ED for injury to the nose, inside the mouth, left knee and right ankle and right foot that occurred when she tripped and fell in the [employer] parking lot about an hour ago prior to her presentation.  She says she had a lot of bleeding from the nose.  She also had bleeding from inside the mouth, she had upper and lower dentures and the upper denture broke.  She complains of pain in the left knee and right ankle and right foot, worse with weightbearing and attempts to ambulate.    ROS:   Pertinent positives and negatives are stated within HPI, all other systems reviewed and are negative.  --------------------------------------------- PAST HISTORY ---------------------------------------------  Past Medical History:  has a past medical history of Arthritis, Asthma, Bipolar depression (HCC), Breast cancer (HCC), Colitis, Diabetes mellitus (HCC), Diabetic peripheral neuropathy (HCC), Diverticulitis, Fatty liver, Fibromyalgia, GERD (gastroesophageal reflux disease), Hiatal hernia, History of therapeutic radiation, HSV-2 infection, Hx antineoplastic chemo, Hyperlipidemia, Hypertension, Malignant neoplasm of left breast (HCC), Migraines, Osteopenia, Pancreatitis, Pericarditis, Pneumonia, TIA (transient ischemic attack), and Transient neurologic deficit.    Past Surgical History:  has a past surgical history that includes Cholecystectomy; Endoscopy, colon, diagnostic; Hysterectomy (1985); skin biopsy; Rectocele repair; other surgical history; Cataract removal with implant (Left, 2/26/13); Cataract removal with implant (Right, 3 5 13); Meckel's diverticulum excision; hernia repair; Appendectomy; Ovary surgery (Bilateral, 1986); Cystoscopy (11/04/2016); Nerve Block (Left, 05/09/2017); Nerve Block (Left, 05/23/2017); cystoscopy w biopsy of bladder (N/A, 2/25/2019); Finger trigger release (Right, 7/16/2019); Shoulder arthroscopy (Right,  Making:    X-ray results: There is a nondisplaced fracture of the base of the fifth metatarsal.    The rest of the x-rays were negative for acute bony abnormalities.    The patient was given a prescription for a walking boot and she will have a postop shoe placed.  She may have a walking boot at home, otherwise soft await to get 1 at a medical supply house.    She will be referred to orthopedic specialty for further evaluation and treatment of her metatarsal fracture.    Counseling:   The emergency provider has spoken with the patient and discussed today’s results, in addition to providing specific details for the plan of care and counseling regarding the diagnosis and prognosis.  Questions are answered at this time and they are agreeable with the plan.      --------------------------------- IMPRESSION AND DISPOSITION ---------------------------------    IMPRESSION  1. Closed nondisplaced fracture of fifth metatarsal bone of right foot, initial encounter    2. Contusion of left knee, initial encounter    3. Contusion of nose, initial encounter    4. Laceration of buccal mucosa, initial encounter        DISPOSITION  Disposition: Discharge to home  Patient condition is stable                 Jacky Verma MD  06/07/25 1933

## 2025-06-23 ENCOUNTER — HOSPITAL ENCOUNTER (OUTPATIENT)
Dept: INFUSION THERAPY | Age: 70
Discharge: HOME | End: 2025-06-23
Payer: MEDICARE

## 2025-06-23 ENCOUNTER — OFFICE VISIT (OUTPATIENT)
Dept: ONCOLOGY | Age: 70
End: 2025-06-23

## 2025-06-23 VITALS
TEMPERATURE: 97.7 F | BODY MASS INDEX: 25.83 KG/M2 | HEIGHT: 66 IN | WEIGHT: 160.7 LBS | SYSTOLIC BLOOD PRESSURE: 120 MMHG | DIASTOLIC BLOOD PRESSURE: 75 MMHG | OXYGEN SATURATION: 95 % | HEART RATE: 80 BPM

## 2025-06-23 DIAGNOSIS — Z17.0 MALIGNANT NEOPLASM OF AXILLARY TAIL OF LEFT BREAST IN FEMALE, ESTROGEN RECEPTOR POSITIVE (HCC): ICD-10-CM

## 2025-06-23 DIAGNOSIS — Z17.0 MALIGNANT NEOPLASM OF AXILLARY TAIL OF LEFT BREAST IN FEMALE, ESTROGEN RECEPTOR POSITIVE (HCC): Primary | ICD-10-CM

## 2025-06-23 DIAGNOSIS — C50.612 MALIGNANT NEOPLASM OF AXILLARY TAIL OF LEFT BREAST IN FEMALE, ESTROGEN RECEPTOR POSITIVE (HCC): Primary | ICD-10-CM

## 2025-06-23 DIAGNOSIS — C50.612 MALIGNANT NEOPLASM OF AXILLARY TAIL OF LEFT BREAST IN FEMALE, ESTROGEN RECEPTOR POSITIVE (HCC): ICD-10-CM

## 2025-06-23 LAB
ALBUMIN SERPL-MCNC: 4.2 G/DL (ref 3.5–5.2)
ALP SERPL-CCNC: 70 U/L (ref 35–104)
ALT SERPL-CCNC: 22 U/L (ref 0–35)
ANION GAP SERPL CALCULATED.3IONS-SCNC: 11 MMOL/L (ref 7–16)
AST SERPL-CCNC: 36 U/L (ref 0–35)
BASOPHILS # BLD: 0.02 K/UL (ref 0–0.2)
BASOPHILS NFR BLD: 0 % (ref 0–2)
BILIRUB SERPL-MCNC: 0.3 MG/DL (ref 0–1.2)
BUN SERPL-MCNC: 13 MG/DL (ref 8–23)
CALCIUM SERPL-MCNC: 9.9 MG/DL (ref 8.8–10.2)
CHLORIDE SERPL-SCNC: 100 MMOL/L (ref 98–107)
CO2 SERPL-SCNC: 27 MMOL/L (ref 22–29)
CREAT SERPL-MCNC: 0.9 MG/DL (ref 0.5–1)
EOSINOPHIL # BLD: 0.08 K/UL (ref 0.05–0.5)
EOSINOPHILS RELATIVE PERCENT: 1 % (ref 0–6)
ERYTHROCYTE [DISTWIDTH] IN BLOOD BY AUTOMATED COUNT: 13.6 % (ref 11.5–15)
GFR, ESTIMATED: 70 ML/MIN/1.73M2
GLUCOSE SERPL-MCNC: 135 MG/DL (ref 74–99)
HCT VFR BLD AUTO: 42 % (ref 34–48)
HGB BLD-MCNC: 14.3 G/DL (ref 11.5–15.5)
IMM GRANULOCYTES # BLD AUTO: <0.03 K/UL (ref 0–0.58)
IMM GRANULOCYTES NFR BLD: 0 % (ref 0–5)
LYMPHOCYTES NFR BLD: 1.91 K/UL (ref 1.5–4)
LYMPHOCYTES RELATIVE PERCENT: 31 % (ref 20–42)
MCH RBC QN AUTO: 34.4 PG (ref 26–35)
MCHC RBC AUTO-ENTMCNC: 34 G/DL (ref 32–34.5)
MCV RBC AUTO: 101 FL (ref 80–99.9)
MONOCYTES NFR BLD: 0.51 K/UL (ref 0.1–0.95)
MONOCYTES NFR BLD: 8 % (ref 2–12)
NEUTROPHILS NFR BLD: 59 % (ref 43–80)
NEUTS SEG NFR BLD: 3.69 K/UL (ref 1.8–7.3)
PLATELET # BLD AUTO: 231 K/UL (ref 130–450)
PMV BLD AUTO: 9.2 FL (ref 7–12)
POTASSIUM SERPL-SCNC: 4.1 MMOL/L (ref 3.5–5.1)
PROT SERPL-MCNC: 7.5 G/DL (ref 6.4–8.3)
RBC # BLD AUTO: 4.16 M/UL (ref 3.5–5.5)
SODIUM SERPL-SCNC: 137 MMOL/L (ref 136–145)
WBC OTHER # BLD: 6.2 K/UL (ref 4.5–11.5)

## 2025-06-23 PROCEDURE — 80053 COMPREHEN METABOLIC PANEL: CPT

## 2025-06-23 PROCEDURE — 85025 COMPLETE CBC W/AUTO DIFF WBC: CPT

## 2025-06-23 PROCEDURE — 36415 COLL VENOUS BLD VENIPUNCTURE: CPT

## 2025-06-23 RX ORDER — SEMAGLUTIDE 1.34 MG/ML
INJECTION, SOLUTION SUBCUTANEOUS
COMMUNITY

## 2025-06-23 RX ORDER — ANASTROZOLE 1 MG/1
1 TABLET ORAL DAILY
Qty: 90 TABLET | Refills: 1 | Status: SHIPPED | OUTPATIENT
Start: 2025-06-23 | End: 2025-12-20

## 2025-06-23 NOTE — PROGRESS NOTES
well-differentiated strongly estrogen positive tumor for lumpectomy without additional radiation and all this to be discussed with Dr. Alexander    The fact that it is imperative that she goes on adjuvant hormonal therapy at this time a thrombophilia panel will be done to include factor V Leiden mutation, MTHFR mutation, prothrombin mutation, RUBEN-1 mutation and factor XIII mutation as well as anticardiolipin antibody titers and DRVVT.      1/31/2024      Her DRVVT/lupus anticoagulant test was negative.  Her anticardiolipin antibody titers were negative.  Her thrombophilia panel was unremarkable without evidence of any mutations involving factor V Leiden, prothrombin, RUBEN-1, MTHFR 677 and factor XIII.  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    Seen by Dr. Alexander and his input appreciated.  Plans are for a second breast conserving surgery after reviewing MRI.  Her genetic testing by [facility] was negative without any deleterious mutations  She was seen by [facility]  Dr Neumann and she could receive additional radiation in the adjuvant setting   She will be eventually recommended adjuvant AI along with antiplatelet therapy      3/11/2024  Patient recovered smoothly from surgery  Oncotype DX 21 is pending and she is expected to have a low score.  She has been seen by Dr. Neumann with plans for additional adjuvant radiation therapy following her conservative surgery and repeat lumpectomy    Final pathology revealed the following  A.          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  Margins of resection are negative for malignancy.  Biopsy site is present.    Immunohistochemistry:  Performed on blocks A1, A2, A3, and A4:             P63: Negative for DCIS.     B.          Left Breast New Inferior, Stitch Castillo New Margin:       Benign breast and fibroadipose tissue, negative for malignancy.     C.          Left

## (undated) DEVICE — ELECTRODE ELECSURG BLADE 7 CM 2.75 IN MOD OPN E-Z CLN

## (undated) DEVICE — BANDAGE,GAUZE,4.5"X4.1YD,STERILE,LF: Brand: MEDLINE

## (undated) DEVICE — BASIC PACK: Brand: CONVERTORS

## (undated) DEVICE — CHLORAPREP 26ML ORANGE

## (undated) DEVICE — 20 ML SYRINGE REGULAR TIP: Brand: MONOJECT

## (undated) DEVICE — GLOVE SURG SZ 65 THK91MIL LTX FREE SYN POLYISOPRENE

## (undated) DEVICE — SUTURE NONABSORBABLE MONOFILAMENT 4-0 PS-2 18 IN BLU PROLENE 8682H

## (undated) DEVICE — DRESSING GZ XRFRM 4X4(25/BX 6BX/CS)

## (undated) DEVICE — MARKER,SKIN,WI/RULER AND LABELS: Brand: MEDLINE

## (undated) DEVICE — UNIVERSAL DRAPE: Brand: MEDLINE INDUSTRIES, INC.

## (undated) DEVICE — GARMENT COMPR STD FOR 17IN CALF UNIF THER FLOTRN

## (undated) DEVICE — GOWN SURG XL SMS FAB NONREINFORCED RAGLAN SLV HK LOOP CLSR

## (undated) DEVICE — GLOVE ORANGE PI 8   MSG9080

## (undated) DEVICE — DRAPE SURG W88XL116IN SMS BODY SPL ORTH N FEN REINF FLD PCH

## (undated) DEVICE — DRAPE,REIN 53X77,STERILE: Brand: MEDLINE

## (undated) DEVICE — GAUZE,SPONGE,4"X4",16PLY,XRAY,STRL,LF: Brand: MEDLINE

## (undated) DEVICE — BASIC SINGLE BASIN 1-LF: Brand: MEDLINE INDUSTRIES, INC.

## (undated) DEVICE — TOWEL,OR,DSP,ST,BLUE,STD,6/PK,12PK/CS: Brand: MEDLINE

## (undated) DEVICE — BLADE SHV L13CM DIA4MM DBL CUT COOLCUT

## (undated) DEVICE — CATHETER URET OLV TIP 5FRX70CM FLEXIMA

## (undated) DEVICE — PEN: MARKING STD 100/CS: Brand: MEDICAL ACTION INDUSTRIES

## (undated) DEVICE — CART DISP LID TRANSPOSAL

## (undated) DEVICE — BANDAGE,GAUZE,BULKEE II,4.5"X4.1YD,STRL: Brand: MEDLINE

## (undated) DEVICE — TIBURON EXTREMITY SHEET: Brand: CONVERTORS

## (undated) DEVICE — BAG DRAINAGE CONTAINER 15 LT FLUID COLLCTN

## (undated) DEVICE — GARMENT,MEDLINE,DVT,INT,CALF,MED, GEN2: Brand: MEDLINE

## (undated) DEVICE — GLOVE ORANGE PI 7 1/2   MSG9075

## (undated) DEVICE — GOWN SURG XL LNG LEN SPUNBOND REINF VELC TIE LEV 4 IMPERV

## (undated) DEVICE — 1810 FOAM BLOCK NEEDLE COUNTER: Brand: DEVON

## (undated) DEVICE — GOWN,SIRUS,NONRNF,SETINSLV,XL,20/CS: Brand: MEDLINE

## (undated) DEVICE — BAG DRNGE COMB PK

## (undated) DEVICE — CYSTO PACK: Brand: MEDLINE INDUSTRIES, INC.

## (undated) DEVICE — TUBING PMP L16FT MAIN DISP FOR AR-6400 AR-6475

## (undated) DEVICE — SYRINGE 20ML LL S/C 50

## (undated) DEVICE — CAMERA STRYKER 1488 HD GEN

## (undated) DEVICE — SOLUTION SURG PREP ANTIMICROBIAL 4 OZ SKIN WND EXIDINE

## (undated) DEVICE — 3M™ STERI-DRAPE™ U-DRAPE, LONG 1019: Brand: STERI-DRAPE™

## (undated) DEVICE — SUTURE PROL SZ 3-0 L18IN NONABSORBABLE BLU L19MM PS-2 3/8 8687H

## (undated) DEVICE — PENCIL ES L3M BTTN SWCH HOLSTER W/ BLDE ELECTRD EDGE

## (undated) DEVICE — SOLUTION IRRIG 1000ML 0.9% SOD CHL USP POUR PLAS BTL

## (undated) DEVICE — NEEDLE HYPO 18GA L1.5IN PNK POLYPR HUB S STL THN WALL FILL

## (undated) DEVICE — 3M™ MEDIPORE™ SOFT CLOTH TAPE, 4 INCH X 10 YARDS, 12 ROLLS/CASE, 2964: Brand: 3M™ MEDIPORE™

## (undated) DEVICE — TUBING, SUCTION, 3/16" X 12', STRAIGHT: Brand: MEDLINE

## (undated) DEVICE — BANDAGE COMPR W4XL108IN WHT LAYERED NO CLSR SYN RUB ESMARCH

## (undated) DEVICE — GOWN,SIRUS,FABRNF,L,20/CS: Brand: MEDLINE

## (undated) DEVICE — SLEEVE TRAC SPANDEX LAT W/ 4IN COBAN SUPERFICIAL RAD NRV PD

## (undated) DEVICE — ENCORE® LATEX TEXTURED SIZE 8, STERILE LATEX POWDER-FREE SURGICAL GLOVE: Brand: ENCORE

## (undated) DEVICE — INTENDED FOR TISSUE SEPARATION, AND OTHER PROCEDURES THAT REQUIRE A SHARP SURGICAL BLADE TO PUNCTURE OR CUT.: Brand: BARD-PARKER ® STAINLESS STEEL BLADES

## (undated) DEVICE — SOLUTION IV IRRIG POUR BRL 0.9% SODIUM CHL 2F7124

## (undated) DEVICE — BLADE,STAINLESS-STEEL,15,STRL,DISPOSABLE: Brand: MEDLINE

## (undated) DEVICE — NEEDLE SPNL L3.5IN PNK HUB S STL REG WALL FIT STYL W/ QNCKE

## (undated) DEVICE — GAUZE,SPONGE,4"X4",16PLY,STRL,LF,10/TRAY: Brand: MEDLINE

## (undated) DEVICE — GLOVE SURG 7 LTX TRIFLEX WIDE FINGER PWDR

## (undated) DEVICE — 3M™ IOBAN™ 2 ANTIMICROBIAL INCISE DRAPE 6640EZ: Brand: IOBAN™ 2

## (undated) DEVICE — ELECTRODE PT RET AD L9FT HI MOIST COND ADH HYDRGEL CORDED

## (undated) DEVICE — DRAPE,SHOULDER,ORTHOMAX,W/POUCH,5/CS: Brand: MEDLINE

## (undated) DEVICE — SYRINGE MED 10ML TRNSLUC BRL PLUNG BLK MRK POLYPR CTRL

## (undated) DEVICE — COVER,LIGHT HANDLE,FLX,2/PK: Brand: MEDLINE INDUSTRIES, INC.

## (undated) DEVICE — SYRINGE MED 10ML LUERLOCK TIP W/O SFTY DISP

## (undated) DEVICE — SOLUTION IV IRRIG WATER 1000ML POUR BRL 2F7114

## (undated) DEVICE — MEDI-VAC NON-CONDUCTIVE SUCTION TUBING: Brand: CARDINAL HEALTH

## (undated) DEVICE — APPLICATOR MEDICATED 26 CC SOLUTION HI LT ORNG CHLORAPREP

## (undated) DEVICE — COVER PRB W14XL147CM TELESCOPICALLY FLD EXT LEN CIV-FLEX

## (undated) DEVICE — PATIENT RETURN ELECTRODE, SINGLE-USE, CONTACT QUALITY MONITORING, ADULT, WITH 9FT CORD, FOR PATIENTS WEIGING OVER 33LBS. (15KG): Brand: MEGADYNE

## (undated) DEVICE — SOLUTION IRRIG 1000ML 09% SOD CHL USP PIC PLAS CONTAINER

## (undated) DEVICE — LIQUIBAND RAPID ADHESIVE 36/CS 0.8ML: Brand: MEDLINE

## (undated) DEVICE — ELECTRODE NDL 2.8IN COAT VALLEYLAB

## (undated) DEVICE — TOWEL OR BLUEE 16X26IN ST 8 PACK ORB08 16X26ORTWL

## (undated) DEVICE — STANDARD HYPODERMIC NEEDLE,ALUMINUM HUB: Brand: MONOJECT

## (undated) DEVICE — Z INACTIVE USE 2635503 SOLUTION IRRIG 3000ML ST H2O USP UROMATIC PLAS CONT

## (undated) DEVICE — HOOK LOCK LATEX FREE ELASTIC BANDAGE 3INX5YD

## (undated) DEVICE — Z CONVERTED USE 2275207 CLOTH PREP W7.5XL7.5IN 2% CHG SKIN ALC AND RNS FREE

## (undated) DEVICE — PROBE ABLAT 90DEG ASPIR MULTIPORT BPLR RF 1 PC ELECTRD ERGO

## (undated) DEVICE — HANDLE CVR PATENTED RETENTION DISC STRL LIGHT SHLD

## (undated) DEVICE — PAD N ADH W3XL4IN POLY COT SFT PERF FLM EASILY CUT ABSRB

## (undated) DEVICE — PROBE GAMMA TRUNODE

## (undated) DEVICE — 5-IN-1 BARBED CONNECTOR POLYPROPYLENE 3/16 - 9/16 IN. (5 - 14.3 MM): Brand: ARGYLE

## (undated) DEVICE — STANDARD HYPODERMIC NEEDLE,POLYPROPYLENE HUB: Brand: MONOJECT

## (undated) DEVICE — GLOVE SURG SZ 6 THK91MIL LTX FREE SYN POLYISOPRENE ANTI